# Patient Record
Sex: FEMALE | Race: WHITE | NOT HISPANIC OR LATINO | Employment: FULL TIME | URBAN - METROPOLITAN AREA
[De-identification: names, ages, dates, MRNs, and addresses within clinical notes are randomized per-mention and may not be internally consistent; named-entity substitution may affect disease eponyms.]

---

## 2018-11-27 ENCOUNTER — INITIAL CONSULT (OUTPATIENT)
Dept: NEUROSURGERY | Facility: CLINIC | Age: 53
End: 2018-11-27
Payer: COMMERCIAL

## 2018-11-27 VITALS
SYSTOLIC BLOOD PRESSURE: 157 MMHG | HEIGHT: 61 IN | WEIGHT: 107.06 LBS | BODY MASS INDEX: 20.21 KG/M2 | DIASTOLIC BLOOD PRESSURE: 88 MMHG | HEART RATE: 65 BPM

## 2018-11-27 DIAGNOSIS — M40.202 KYPHOSIS OF CERVICAL REGION, UNSPECIFIED KYPHOSIS TYPE: Primary | ICD-10-CM

## 2018-11-27 DIAGNOSIS — M43.12 SPONDYLOLISTHESIS OF CERVICAL REGION: ICD-10-CM

## 2018-11-27 DIAGNOSIS — M48.02 SPINAL STENOSIS IN CERVICAL REGION: ICD-10-CM

## 2018-11-27 DIAGNOSIS — M54.16 SPINAL STENOSIS OF LUMBAR REGION WITH RADICULOPATHY: ICD-10-CM

## 2018-11-27 DIAGNOSIS — M51.24 THORACIC DISC HERNIATION: ICD-10-CM

## 2018-11-27 DIAGNOSIS — M48.061 SPINAL STENOSIS OF LUMBAR REGION WITH RADICULOPATHY: ICD-10-CM

## 2018-11-27 DIAGNOSIS — M51.9 FORAMINAL STENOSIS DUE TO INTERVERTEBRAL DISC DISEASE: ICD-10-CM

## 2018-11-27 DIAGNOSIS — M99.79 FORAMINAL STENOSIS DUE TO INTERVERTEBRAL DISC DISEASE: ICD-10-CM

## 2018-11-27 DIAGNOSIS — M51.36 LUMBAR DEGENERATIVE DISC DISEASE: ICD-10-CM

## 2018-11-27 PROCEDURE — 99999 PR PBB SHADOW E&M-NEW PATIENT-LVL III: CPT | Mod: PBBFAC,,, | Performed by: STUDENT IN AN ORGANIZED HEALTH CARE EDUCATION/TRAINING PROGRAM

## 2018-11-27 PROCEDURE — 99245 OFF/OP CONSLTJ NEW/EST HI 55: CPT | Mod: S$GLB,,, | Performed by: STUDENT IN AN ORGANIZED HEALTH CARE EDUCATION/TRAINING PROGRAM

## 2018-11-27 NOTE — LETTER
November 27, 2018      Pablo Madison MD  [address] [facility] [place]  [facility] [address]           [facility]  [address] [facility] [address] [place] [address]  Phone: [phone]  Fax: [phone]          Patient: Rabia Winn   MR Number: [id number]   YOB: 1965   Date of Visit: 11/27/2018       Dear Dr. Pablo Madison:    Thank you for referring Rabia Winn to me for evaluation. Attached you will find relevant portions of my assessment and plan of care.    If you have questions, please do not hesitate to call me. I look forward to following Rabia Winn along with you.    Sincerely,    Jesus Kerr MD    Enclosure  CC:  No Recipients    If you would like to receive this communication electronically, please contact [email or web address] or [phone] to request more information on [employer] access.    For providers and/or their staff who would like to refer a patient to [facility], please contact us through our one-stop-shop provider referral line, [facility], at [phone].    If you feel you have received this communication in error or would no longer like to receive these types of communications, please e-mail [email or web address]
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
## 2018-11-29 ENCOUNTER — TELEPHONE (OUTPATIENT)
Dept: PAIN MEDICINE | Facility: CLINIC | Age: 53
End: 2018-11-29

## 2018-11-29 NOTE — TELEPHONE ENCOUNTER
----- Message from Nat Tan LPN sent at 11/29/2018  9:23 AM CST -----  Please call pt to schedule C4-5 facet injection per Dr. Kerr. Referral in system.

## 2018-11-30 NOTE — TELEPHONE ENCOUNTER
Spoke with the patient and she had a lot of questions regarding the recommended injection. Consult appointment scheduled to discuss with Dr. Bowles.

## 2018-12-04 ENCOUNTER — OFFICE VISIT (OUTPATIENT)
Dept: PAIN MEDICINE | Facility: CLINIC | Age: 53
End: 2018-12-04
Payer: COMMERCIAL

## 2018-12-04 VITALS
WEIGHT: 108.56 LBS | DIASTOLIC BLOOD PRESSURE: 87 MMHG | OXYGEN SATURATION: 100 % | SYSTOLIC BLOOD PRESSURE: 169 MMHG | BODY MASS INDEX: 20.52 KG/M2 | HEART RATE: 65 BPM

## 2018-12-04 DIAGNOSIS — M79.18 MYOFASCIAL MUSCLE PAIN: ICD-10-CM

## 2018-12-04 DIAGNOSIS — M54.16 LUMBAR RADICULOPATHY: ICD-10-CM

## 2018-12-04 DIAGNOSIS — M47.812 SPONDYLOSIS OF CERVICAL REGION WITHOUT MYELOPATHY OR RADICULOPATHY: Primary | ICD-10-CM

## 2018-12-04 PROCEDURE — 20552 NJX 1/MLT TRIGGER POINT 1/2: CPT | Mod: S$GLB,,, | Performed by: ANESTHESIOLOGY

## 2018-12-04 PROCEDURE — 99999 PR PBB SHADOW E&M-EST. PATIENT-LVL III: CPT | Mod: PBBFAC,,, | Performed by: ANESTHESIOLOGY

## 2018-12-04 PROCEDURE — 99244 OFF/OP CNSLTJ NEW/EST MOD 40: CPT | Mod: 25,S$GLB,, | Performed by: ANESTHESIOLOGY

## 2018-12-04 RX ORDER — CELECOXIB 200 MG/1
200 CAPSULE ORAL DAILY PRN
Qty: 30 CAPSULE | Refills: 1 | Status: SHIPPED | OUTPATIENT
Start: 2018-12-04 | End: 2019-08-02 | Stop reason: SDUPTHER

## 2018-12-04 RX ORDER — GABAPENTIN 100 MG/1
100 CAPSULE ORAL DAILY
COMMUNITY

## 2018-12-04 RX ORDER — CELECOXIB 200 MG/1
200 CAPSULE ORAL ONCE
COMMUNITY
End: 2018-12-04

## 2018-12-04 RX ORDER — METHYLPREDNISOLONE ACETATE 40 MG/ML
40 INJECTION, SUSPENSION INTRA-ARTICULAR; INTRALESIONAL; INTRAMUSCULAR; SOFT TISSUE
Status: COMPLETED | OUTPATIENT
Start: 2018-12-04 | End: 2018-12-04

## 2018-12-04 RX ADMIN — METHYLPREDNISOLONE ACETATE 40 MG: 40 INJECTION, SUSPENSION INTRA-ARTICULAR; INTRALESIONAL; INTRAMUSCULAR; SOFT TISSUE at 02:12

## 2018-12-04 NOTE — LETTER
December 4, 2018      Jesus Kerr MD  [address] [facility] [address] [place] [address]           [facility]  [address] [facility] [address] [place] [address]  Phone: [phone]  Fax: [phone]          Patient: Rabia Winn   MR Number: [id number]   YOB: 1965   Date of Visit: 12/4/2018       Dear Dr. Jesus Kerr:    Thank you for referring Rabia Winn to me for evaluation. Attached you will find relevant portions of my assessment and plan of care.    If you have questions, please do not hesitate to call me. I look forward to following Rabia Winn along with you.    Sincerely,    Charlie Bowles MD    Enclosure  CC:  No Recipients    If you would like to receive this communication electronically, please contact [email or web address] or [phone] to request more information on [employer] Link access.    For providers and/or their staff who would like to refer a patient to [facility], please contact us through our one-stop-shop provider referral line, [facility], at [phone].    If you feel you have received this communication in error or would no longer like to receive these types of communications, please e-mail [email or web address]
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
## 2018-12-12 ENCOUNTER — TELEPHONE (OUTPATIENT)
Dept: PAIN MEDICINE | Facility: CLINIC | Age: 53
End: 2018-12-12

## 2018-12-12 DIAGNOSIS — M47.812 CERVICAL SPONDYLOSIS: ICD-10-CM

## 2018-12-12 DIAGNOSIS — M47.812 SPONDYLOSIS OF CERVICAL REGION WITHOUT MYELOPATHY OR RADICULOPATHY: Primary | ICD-10-CM

## 2018-12-12 RX ORDER — ALPRAZOLAM 0.5 MG/1
0.5 TABLET, ORALLY DISINTEGRATING ORAL ONCE AS NEEDED
Status: CANCELLED | OUTPATIENT
Start: 2018-12-19 | End: 2018-12-19

## 2018-12-12 NOTE — TELEPHONE ENCOUNTER
Patient scheduled her cervical medial branch block for 12/19, procedure instructions reviewed and patient verbalized understanding.

## 2018-12-12 NOTE — TELEPHONE ENCOUNTER
----- Message from Bernie Omalley sent at 12/12/2018  2:00 PM CST -----  Contact: self   Patient want to speak with a nurse regarding scheduling injections in neck please call back at [phone]

## 2018-12-17 ENCOUNTER — TELEPHONE (OUTPATIENT)
Dept: SURGERY | Facility: HOSPITAL | Age: 53
End: 2018-12-17

## 2018-12-17 NOTE — TELEPHONE ENCOUNTER
When doing pre op call, patient reported that she had taken her last dose of a Z-pack for a sore throat today. Procedure is scheduled for Wednesday, December 19th. Will this require her procedure to be rescheduled?

## 2018-12-18 DIAGNOSIS — M50.30 DDD (DEGENERATIVE DISC DISEASE), CERVICAL: Primary | ICD-10-CM

## 2018-12-19 ENCOUNTER — HOSPITAL ENCOUNTER (OUTPATIENT)
Dept: RADIOLOGY | Facility: HOSPITAL | Age: 53
Discharge: HOME | End: 2018-12-19
Attending: ANESTHESIOLOGY | Admitting: ANESTHESIOLOGY
Payer: COMMERCIAL

## 2018-12-19 ENCOUNTER — HOSPITAL ENCOUNTER (OUTPATIENT)
Facility: HOSPITAL | Age: 53
Discharge: HOME | End: 2018-12-19
Attending: ANESTHESIOLOGY | Admitting: ANESTHESIOLOGY
Payer: COMMERCIAL

## 2018-12-19 VITALS
RESPIRATION RATE: 16 BRPM | SYSTOLIC BLOOD PRESSURE: 133 MMHG | DIASTOLIC BLOOD PRESSURE: 75 MMHG | OXYGEN SATURATION: 98 % | TEMPERATURE: 97 F | WEIGHT: 108 LBS | BODY MASS INDEX: 20.39 KG/M2 | HEIGHT: 61 IN | HEART RATE: 71 BPM

## 2018-12-19 DIAGNOSIS — M47.812 CERVICAL SPONDYLOSIS: Primary | ICD-10-CM

## 2018-12-19 DIAGNOSIS — M47.812 SPONDYLOSIS OF CERVICAL REGION WITHOUT MYELOPATHY OR RADICULOPATHY: ICD-10-CM

## 2018-12-19 DIAGNOSIS — M50.30 DDD (DEGENERATIVE DISC DISEASE), CERVICAL: ICD-10-CM

## 2018-12-19 PROCEDURE — 64492 INJ PARAVERT F JNT C/T 3 LEV: CPT | Mod: 50,PO | Performed by: ANESTHESIOLOGY

## 2018-12-19 PROCEDURE — 63600175 PHARM REV CODE 636 W HCPCS: Mod: PO | Performed by: ANESTHESIOLOGY

## 2018-12-19 PROCEDURE — 25000003 PHARM REV CODE 250: Mod: PO | Performed by: ANESTHESIOLOGY

## 2018-12-19 PROCEDURE — 64490 INJ PARAVERT F JNT C/T 1 LEV: CPT | Mod: 50,PO | Performed by: ANESTHESIOLOGY

## 2018-12-19 PROCEDURE — 99152 MOD SED SAME PHYS/QHP 5/>YRS: CPT | Mod: ,,, | Performed by: ANESTHESIOLOGY

## 2018-12-19 PROCEDURE — 64491 INJ PARAVERT F JNT C/T 2 LEV: CPT | Mod: 50,PO | Performed by: ANESTHESIOLOGY

## 2018-12-19 PROCEDURE — 64490 INJ PARAVERT F JNT C/T 1 LEV: CPT | Mod: 50,,, | Performed by: ANESTHESIOLOGY

## 2018-12-19 PROCEDURE — 64491 INJ PARAVERT F JNT C/T 2 LEV: CPT | Mod: 50,,, | Performed by: ANESTHESIOLOGY

## 2018-12-19 PROCEDURE — 76000 FLUOROSCOPY <1 HR PHYS/QHP: CPT | Mod: TC,PO

## 2018-12-19 PROCEDURE — S0020 INJECTION, BUPIVICAINE HYDRO: HCPCS | Mod: PO | Performed by: ANESTHESIOLOGY

## 2018-12-19 PROCEDURE — A4216 STERILE WATER/SALINE, 10 ML: HCPCS | Mod: PO | Performed by: ANESTHESIOLOGY

## 2018-12-19 RX ORDER — LIDOCAINE HYDROCHLORIDE 10 MG/ML
INJECTION, SOLUTION EPIDURAL; INFILTRATION; INTRACAUDAL; PERINEURAL
Status: DISCONTINUED | OUTPATIENT
Start: 2018-12-19 | End: 2018-12-19 | Stop reason: HOSPADM

## 2018-12-19 RX ORDER — MIDAZOLAM HYDROCHLORIDE 1 MG/ML
INJECTION INTRAMUSCULAR; INTRAVENOUS
Status: DISCONTINUED | OUTPATIENT
Start: 2018-12-19 | End: 2018-12-19 | Stop reason: HOSPADM

## 2018-12-19 RX ORDER — SODIUM BICARBONATE 1 MEQ/ML
SYRINGE (ML) INTRAVENOUS
Status: DISCONTINUED | OUTPATIENT
Start: 2018-12-19 | End: 2018-12-19 | Stop reason: HOSPADM

## 2018-12-19 RX ORDER — ALPRAZOLAM 0.5 MG/1
0.5 TABLET, ORALLY DISINTEGRATING ORAL ONCE AS NEEDED
Status: COMPLETED | OUTPATIENT
Start: 2018-12-19 | End: 2018-12-19

## 2018-12-19 RX ORDER — BUPIVACAINE HYDROCHLORIDE 2.5 MG/ML
INJECTION, SOLUTION EPIDURAL; INFILTRATION; INTRACAUDAL
Status: DISCONTINUED | OUTPATIENT
Start: 2018-12-19 | End: 2018-12-19 | Stop reason: HOSPADM

## 2018-12-19 RX ORDER — SODIUM CHLORIDE 9 MG/ML
INJECTION, SOLUTION INTRAMUSCULAR; INTRAVENOUS; SUBCUTANEOUS
Status: DISCONTINUED | OUTPATIENT
Start: 2018-12-19 | End: 2018-12-19 | Stop reason: HOSPADM

## 2018-12-19 RX ORDER — BUPIVACAINE HYDROCHLORIDE 5 MG/ML
INJECTION, SOLUTION EPIDURAL; INTRACAUDAL
Status: DISCONTINUED | OUTPATIENT
Start: 2018-12-19 | End: 2018-12-19 | Stop reason: HOSPADM

## 2018-12-19 RX ADMIN — ALPRAZOLAM 0.5 MG: 0.5 TABLET, ORALLY DISINTEGRATING ORAL at 01:12

## 2018-12-19 NOTE — OP NOTE
Procedure date: 12/19/2018    Procedure:  Cervical Facet Block(s) (Medial Branch) @ C3-C6, with Fluoroscopic Guidance on the bilateral side utilizing fluoroscopy    Indication: Patient failed conservative therapy    Pre-op diagnosis: Cervical spondylosis    Post-op diagnosis: same    Physician: Charlie Bowles MD    Medications injected:  Mixture of 3ml bupivacaine 0.25% and 4mg dexamethasone, 0.5ml at each level    Local anesthetic used: 1% lidocaine, 1ml at each level.    Sedation medications: versed 2mg    Estimated blood loss:  none    Complications:  none    Technique: The patient was interviewed in the holding area and Risks/Benefits were discussed, including, but not limited to, the possibility of new or different pain, bleeding or infection.  All questions were answered.  The patient understood and accepted risks.  Consent was reviewed.  A time-out was taken to identify patient and procedure side prior to starting the procedure.  The patient was brought into the operating room and placed in prone position and prepped and draped in the usual fashion using ChloraPrep and sterile towels and then the procedure was performed using strict aseptic techniques.  AP fluoroscopy was used to identify the waists of the mid-articular pillars of the C3-C6 on the right side.  1% Lidocaine was used via a 25 Gauge needle for skin.  Then, under AP fluoroscopic guidance, a 25 gauge 3.5 inch spinal needle was advanced to the anatomic location of the midsection of the lateral masses (or in the case of third occipital nerve, to the joint of C2/C3).  Once os was encountered, lateral fluoroscopic views were obtained to ensure that needles did not cross into neural foramina.  After heme-negative aspiration, 0.5ml of a mixture of 3 mL 0.25% bupivacaine and 4mg dexamethasone was injected at each of the above targeted points corresponding to the locations of the targeted medial branch nerves.     The same procedure was repeated on the  opposite side    The patient tolerated the procedure well and was transferred to the .AC. in stable condition.  The patient was monitored after the procedure.  The patient will be contacted tomorrow to determine the extent of relief.  The patient was given post procedure and discharge instructions to follow at home.  The patient was discharged in a stable condition.

## 2018-12-19 NOTE — INTERVAL H&P NOTE
The patient has been examined and the H&P has been reviewed:    I concur with the findings and no changes have occurred since H&P was written.  Will proceed with bilateral C3-C6 medial branch blocks.  Procedure explained using an anatomical model.  Risks, benefits, alternatives explained to patient who verbalized understanding, including increased risk of infection, bleeding, need for additional procedures or surgery, and nerve damage.  Questions regarding the procedure, risks, expected outcome, and possible side effects were solicited and answered to the patient's satisfaction.  Rabia wishes to proceed with the injection.  Verbal and written consent were obtained.    Anesthesia/Surgery risks, benefits and alternative options discussed and understood by patient/family.          Active Hospital Problems    Diagnosis  POA    Cervical spondylosis [M47.812]  Yes      Resolved Hospital Problems   No resolved problems to display.

## 2018-12-19 NOTE — PLAN OF CARE
Vss, erick po fluids, denies pain, ambulates easily. IV removed, catheter intact. Discharge instructions provided and states understanding. States ready to go home.  Discharged from facility with family.

## 2018-12-19 NOTE — DISCHARGE SUMMARY
[facility]  Discharge Note  Short Stay    Admit Date: 12/19/2018    Discharge Date: 12/19/2018    Attending Physician: Charlie Bowles     Discharge Provider: Charlie Bowles    Diagnoses:  Active Hospital Problems    Diagnosis  POA    Cervical spondylosis [M47.812]  Yes      Resolved Hospital Problems   No resolved problems to display.       Discharged Condition: Good    Final Diagnoses: Spondylosis of cervical region without myelopathy or radiculopathy [M47.812]    Disposition: Home or Self Care    Hospital Course: No complications, uneventful    Outcome of Hospitalization, Treatment, Procedure, or Surgery:  Patient was admitted for outpatient interventional pain management procedure. The patient tolerated the procedure well with no complications.    Follow up/Patient Instructions:  Follow up as scheduled in Pain Management office in 3-4 weeks.  Patient has received instructions and follow up date and time.    Medications:  Continue previous medications    Discharge Procedure Orders   Notify your health care provider if you experience any of the following:  temperature >100.4     Notify your health care provider if you experience any of the following:  persistent nausea and vomiting or diarrhea     Notify your health care provider if you experience any of the following:  severe uncontrolled pain     Notify your health care provider if you experience any of the following:  redness, tenderness, or signs of infection (pain, swelling, redness, odor or green/yellow discharge around incision site)     Notify your health care provider if you experience any of the following:  difficulty breathing or increased cough     Notify your health care provider if you experience any of the following:  severe persistent headache     Notify your health care provider if you experience any of the following:  worsening rash     Notify your health care provider if you experience any of the following:  persistent dizziness,  light-headedness, or visual disturbances     Notify your health care provider if you experience any of the following:  increased confusion or weakness     Activity as tolerated         Discharge Procedure Orders (must include Diet, Follow-up, Activity):   Discharge Procedure Orders (must include Diet, Follow-up, Activity)   Notify your health care provider if you experience any of the following:  temperature >100.4     Notify your health care provider if you experience any of the following:  persistent nausea and vomiting or diarrhea     Notify your health care provider if you experience any of the following:  severe uncontrolled pain     Notify your health care provider if you experience any of the following:  redness, tenderness, or signs of infection (pain, swelling, redness, odor or green/yellow discharge around incision site)     Notify your health care provider if you experience any of the following:  difficulty breathing or increased cough     Notify your health care provider if you experience any of the following:  severe persistent headache     Notify your health care provider if you experience any of the following:  worsening rash     Notify your health care provider if you experience any of the following:  persistent dizziness, light-headedness, or visual disturbances     Notify your health care provider if you experience any of the following:  increased confusion or weakness     Activity as tolerated

## 2018-12-19 NOTE — DISCHARGE INSTRUCTIONS
Home care instructions  Apply ice pack to the injection site for 20 minutes periods for the first 24 hrs for soreness/discomfort at injection site DO NOT USE HEAT FOR 24 HOURS  Keep site clean and dry for 24 hours, remove bandaid when desired  Do not drive until tomorrow  Avoid strenuous activities for 2 days  Make take 2 weeks to feel the full effects   Resume home medication as prescribed today  Resume Aspirin, Plavix, or Coumadin the day after the procedure unless otherwise instructed.    SEE IMMEDIATE MEDICAL HELP FOR:  Severe increase in your usual pain or appearance of new pain  Prolonged or increasing weakness or numbness in the legs or arms  Drainage, redness, active bleeding, or increased swelling at the injection site  Temperature over 100.0 degrees F.  Headache that increases when your head is upright and decreases when you lie flat    CALL 911 OR GO DIRECTLY TO EMERGENCY DEPARTMENT FOR:  Shortness of breath, chest pain, or problems breathing      Recovery After Procedural Sedation (Adult)  You have been given medicine by vein to make you sleep during your surgery. This may have included both a pain medicine and sleeping medicine. Most of the effects have worn off. But you may still have some drowsiness for the next 6 to 8 hours.  Home care  Follow these guidelines when you get home:  · For the next 8 hours, you should be watched by a responsible adult. This person should make sure your condition is not getting worse.  · Don't drink any alcohol for the next 24 hours.  · Don't drive, operate dangerous machinery, or make important business or personal decisions during the next 24 hours.  Note: Your healthcare provider may tell you not to take any medicine by mouth for pain or sleep in the next 4 hours. These medicines may react with the medicines you were given in the hospital. This could cause a much stronger response than usual.  Follow-up care  Follow up with your healthcare provider if you are not alert  and back to your usual level of activity within 12 hours.  When to seek medical advice  Call your healthcare provider right away if any of these occur:  · Drowsiness gets worse  · Weakness or dizziness gets worse  · Repeated vomiting  · You can't be awakened   Date Last Reviewed: 10/18/2016  © 5542-6567 [employer]. [address] [place] [address], [place] [address]. All rights reserved. This information is not intended as a substitute for professional medical care. Always follow your healthcare professional's instructions.

## 2019-01-08 ENCOUNTER — TELEPHONE (OUTPATIENT)
Dept: NEUROSURGERY | Facility: CLINIC | Age: 54
End: 2019-01-08

## 2019-01-08 NOTE — TELEPHONE ENCOUNTER
Spoke with pt and informed that letter would be determined at next appt. Pt verbalized understanding.

## 2019-01-08 NOTE — TELEPHONE ENCOUNTER
----- Message from Onelia Nolasco sent at 1/8/2019 12:20 PM CST -----  Contact: self  Patient called requesting a letter excusing her from jury duty. Patient call back is [phone]. Thank you.

## 2019-01-15 ENCOUNTER — OFFICE VISIT (OUTPATIENT)
Dept: NEUROSURGERY | Facility: CLINIC | Age: 54
End: 2019-01-15
Payer: COMMERCIAL

## 2019-01-15 VITALS
BODY MASS INDEX: 20.81 KG/M2 | DIASTOLIC BLOOD PRESSURE: 74 MMHG | TEMPERATURE: 98 F | WEIGHT: 110.13 LBS | HEART RATE: 66 BPM | SYSTOLIC BLOOD PRESSURE: 168 MMHG

## 2019-01-15 DIAGNOSIS — M40.12 OTHER SECONDARY KYPHOSIS, CERVICAL REGION: ICD-10-CM

## 2019-01-15 DIAGNOSIS — M47.22 OSTEOARTHRITIS OF SPINE WITH RADICULOPATHY, CERVICAL REGION: Primary | ICD-10-CM

## 2019-01-15 DIAGNOSIS — R93.7 ABNORMAL X-RAY OF CERVICAL SPINE: ICD-10-CM

## 2019-01-15 PROCEDURE — 99215 PR OFFICE/OUTPT VISIT, EST, LEVL V, 40-54 MIN: ICD-10-PCS | Mod: S$GLB,,, | Performed by: STUDENT IN AN ORGANIZED HEALTH CARE EDUCATION/TRAINING PROGRAM

## 2019-01-15 PROCEDURE — 99215 OFFICE O/P EST HI 40 MIN: CPT | Mod: S$GLB,,, | Performed by: STUDENT IN AN ORGANIZED HEALTH CARE EDUCATION/TRAINING PROGRAM

## 2019-01-15 PROCEDURE — 99999 PR PBB SHADOW E&M-EST. PATIENT-LVL III: CPT | Mod: PBBFAC,,, | Performed by: STUDENT IN AN ORGANIZED HEALTH CARE EDUCATION/TRAINING PROGRAM

## 2019-01-15 PROCEDURE — 99999 PR PBB SHADOW E&M-EST. PATIENT-LVL III: ICD-10-PCS | Mod: PBBFAC,,, | Performed by: STUDENT IN AN ORGANIZED HEALTH CARE EDUCATION/TRAINING PROGRAM

## 2019-01-15 RX ORDER — ZOLPIDEM TARTRATE 5 MG/1
5 TABLET ORAL NIGHTLY PRN
Qty: 30 TABLET | Refills: 0 | Status: SHIPPED | OUTPATIENT
Start: 2019-01-15 | End: 2019-03-22 | Stop reason: SDUPTHER

## 2019-01-15 RX ORDER — METHYLPREDNISOLONE 4 MG/1
TABLET ORAL
Qty: 1 PACKAGE | Refills: 0 | Status: SHIPPED | OUTPATIENT
Start: 2019-01-15 | End: 2019-01-24 | Stop reason: CLARIF
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
## 2019-01-16 DIAGNOSIS — Z98.1 S/P CERVICAL SPINAL FUSION: Primary | ICD-10-CM

## 2019-01-16 DIAGNOSIS — M47.22 OSTEOARTHRITIS OF SPINE WITH RADICULOPATHY, CERVICAL REGION: Primary | ICD-10-CM

## 2019-01-16 RX ORDER — SODIUM CHLORIDE, SODIUM LACTATE, POTASSIUM CHLORIDE, CALCIUM CHLORIDE 600; 310; 30; 20 MG/100ML; MG/100ML; MG/100ML; MG/100ML
INJECTION, SOLUTION INTRAVENOUS CONTINUOUS
Status: CANCELLED | OUTPATIENT
Start: 2019-01-16

## 2019-01-16 RX ORDER — LIDOCAINE HYDROCHLORIDE 10 MG/ML
1 INJECTION, SOLUTION EPIDURAL; INFILTRATION; INTRACAUDAL; PERINEURAL ONCE
Status: CANCELLED | OUTPATIENT
Start: 2019-01-16 | End: 2019-01-16

## 2019-01-22 ENCOUNTER — HOSPITAL ENCOUNTER (OUTPATIENT)
Dept: RADIOLOGY | Facility: HOSPITAL | Age: 54
Discharge: HOME | End: 2019-01-22
Attending: STUDENT IN AN ORGANIZED HEALTH CARE EDUCATION/TRAINING PROGRAM
Payer: COMMERCIAL

## 2019-01-22 DIAGNOSIS — R93.7 ABNORMAL X-RAY OF CERVICAL SPINE: ICD-10-CM

## 2019-01-22 PROCEDURE — 72125 CT NECK SPINE W/O DYE: CPT | Mod: 26,,, | Performed by: RADIOLOGY

## 2019-01-22 PROCEDURE — 72125 CT CERVICAL SPINE WITHOUT CONTRAST: ICD-10-PCS | Mod: 26,,, | Performed by: RADIOLOGY

## 2019-01-22 PROCEDURE — 72050 XR CERVICAL SPINE AP LAT WITH FLEX EXTEN: ICD-10-PCS | Mod: 26,,, | Performed by: RADIOLOGY

## 2019-01-22 PROCEDURE — 72050 X-RAY EXAM NECK SPINE 4/5VWS: CPT | Mod: 26,,, | Performed by: RADIOLOGY

## 2019-01-22 PROCEDURE — 72125 CT NECK SPINE W/O DYE: CPT | Mod: TC,PO

## 2019-01-22 PROCEDURE — 72050 X-RAY EXAM NECK SPINE 4/5VWS: CPT | Mod: TC,FY,PO

## 2019-01-28 ENCOUNTER — OFFICE VISIT (OUTPATIENT)
Dept: FAMILY MEDICINE | Facility: CLINIC | Age: 54
End: 2019-01-28
Payer: COMMERCIAL

## 2019-01-28 VITALS
HEIGHT: 61 IN | OXYGEN SATURATION: 99 % | SYSTOLIC BLOOD PRESSURE: 154 MMHG | BODY MASS INDEX: 19.9 KG/M2 | WEIGHT: 105.38 LBS | HEART RATE: 62 BPM | DIASTOLIC BLOOD PRESSURE: 90 MMHG

## 2019-01-28 DIAGNOSIS — Z00.00 PREVENTATIVE HEALTH CARE: ICD-10-CM

## 2019-01-28 DIAGNOSIS — I10 ESSENTIAL HYPERTENSION: ICD-10-CM

## 2019-01-28 DIAGNOSIS — Z01.818 PRE-OP EXAM: Primary | ICD-10-CM

## 2019-01-28 PROCEDURE — 99999 PR PBB SHADOW E&M-EST. PATIENT-LVL III: CPT | Mod: PBBFAC,,, | Performed by: NURSE PRACTITIONER

## 2019-01-28 PROCEDURE — 99214 PR OFFICE/OUTPT VISIT, EST, LEVL IV, 30-39 MIN: ICD-10-PCS | Mod: S$GLB,,, | Performed by: NURSE PRACTITIONER

## 2019-01-28 PROCEDURE — 93010 ELECTROCARDIOGRAM REPORT: CPT | Mod: S$GLB,,, | Performed by: INTERNAL MEDICINE

## 2019-01-28 PROCEDURE — 99999 PR PBB SHADOW E&M-EST. PATIENT-LVL III: ICD-10-PCS | Mod: PBBFAC,,, | Performed by: NURSE PRACTITIONER

## 2019-01-28 PROCEDURE — 93005 EKG 12-LEAD: ICD-10-PCS | Mod: S$GLB,,, | Performed by: NURSE PRACTITIONER

## 2019-01-28 PROCEDURE — 93010 EKG 12-LEAD: ICD-10-PCS | Mod: S$GLB,,, | Performed by: INTERNAL MEDICINE

## 2019-01-28 PROCEDURE — 93005 ELECTROCARDIOGRAM TRACING: CPT | Mod: S$GLB,,, | Performed by: NURSE PRACTITIONER

## 2019-01-28 PROCEDURE — 99214 OFFICE O/P EST MOD 30 MIN: CPT | Mod: S$GLB,,, | Performed by: NURSE PRACTITIONER

## 2019-01-28 RX ORDER — AMLODIPINE BESYLATE 5 MG/1
2.5 TABLET ORAL DAILY
Qty: 30 TABLET | Refills: 3 | Status: SHIPPED | OUTPATIENT
Start: 2019-01-28 | End: 2019-05-16 | Stop reason: SINTOL

## 2019-01-28 NOTE — Clinical Note
Patient here for pre operative clearance for surgery Monday, exam stable for pre op clearance except for elevated BP, med started, chris recheck Thursday scheduled.
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
## 2019-01-29 ENCOUNTER — TELEPHONE (OUTPATIENT)
Dept: FAMILY MEDICINE | Facility: CLINIC | Age: 54
End: 2019-01-29

## 2019-01-31 ENCOUNTER — TELEPHONE (OUTPATIENT)
Dept: NEUROSURGERY | Facility: CLINIC | Age: 54
End: 2019-01-31

## 2019-01-31 ENCOUNTER — CLINICAL SUPPORT (OUTPATIENT)
Dept: FAMILY MEDICINE | Facility: CLINIC | Age: 54
End: 2019-01-31
Payer: COMMERCIAL

## 2019-01-31 VITALS — SYSTOLIC BLOOD PRESSURE: 138 MMHG | DIASTOLIC BLOOD PRESSURE: 88 MMHG

## 2019-01-31 DIAGNOSIS — Z01.30 BLOOD PRESSURE CHECK: Primary | ICD-10-CM

## 2019-01-31 PROCEDURE — 99499 NO LOS: ICD-10-PCS | Mod: S$GLB,,, | Performed by: NURSE PRACTITIONER

## 2019-01-31 PROCEDURE — 99499 UNLISTED E&M SERVICE: CPT | Mod: S$GLB,,, | Performed by: NURSE PRACTITIONER

## 2019-01-31 NOTE — TELEPHONE ENCOUNTER
Spoke with patient in regards to surgery auth. Patient informed that her surgery would be considered a self pay if she chose to proceed with her surgery at [facility]. Patient agreed to call her insurance.

## 2019-01-31 NOTE — PROGRESS NOTES
Rabia Winn 53 y.o. female is here today for Blood Pressure check.   History of HTN no.    Review of patient's allergies indicates:  No Known Allergies  Creatinine   Date Value Ref Range Status   01/24/2019 0.55 0.50 - 1.40 mg/dL Final     Sodium   Date Value Ref Range Status   01/24/2019 141 136 - 145 mmol/L Final     Potassium   Date Value Ref Range Status   01/24/2019 4.3 3.5 - 5.1 mmol/L Final   ]  Patient verifies taking blood pressure medications on a regular basis at the same time of the day.     Current Outpatient Medications:     amLODIPine (NORVASC) 5 MG tablet, Take 0.5 tablets (2.5 mg total) by mouth once daily., Disp: 30 tablet, Rfl: 3    celecoxib (CELEBREX) 200 MG capsule, Take 1 capsule (200 mg total) by mouth daily as needed for Pain., Disp: 30 capsule, Rfl: 1    [START ON 2/2/2019] chlorhexidine (PERIDEX) 0.12 % solution, Use as directed 10 mLs in the mouth or throat 2 (two) times daily., Disp: , Rfl:     gabapentin (NEURONTIN) 100 MG capsule, Take 100 mg by mouth once., Disp: , Rfl:     [START ON 2/2/2019] mupirocin (BACTROBAN) 2 % ointment, Apply topically 2 (two) times daily., Disp: , Rfl:     zolpidem (AMBIEN) 5 MG Tab, Take 1 tablet (5 mg total) by mouth nightly as needed., Disp: 30 tablet, Rfl: 0  Does patient have record of home blood pressure readings no. Readings have been averaging n/a.   Last dose of blood pressure medication was taken at today.  Patient is asymptomatic.   Complains of nothing.    BP: 138/88 ,   .    Blood pressure reading after 15 minutes was n/a, Pulse n/a.  Dr. Rondon, NP notified.

## 2019-02-01 ENCOUNTER — TELEPHONE (OUTPATIENT)
Dept: NEUROSURGERY | Facility: CLINIC | Age: 54
End: 2019-02-01

## 2019-02-02 ENCOUNTER — TELEPHONE (OUTPATIENT)
Dept: GASTROENTEROLOGY | Facility: CLINIC | Age: 54
End: 2019-02-02

## 2019-02-02 NOTE — TELEPHONE ENCOUNTER
Pt requesting a call in February to schedule colonoscopy. Pt is having neck sx and wants to wait til after her recovery.

## 2019-02-04 DIAGNOSIS — M47.22 OSTEOARTHRITIS OF SPINE WITH RADICULOPATHY, CERVICAL REGION: Primary | ICD-10-CM

## 2019-02-04 NOTE — PRE-PROCEDURE INSTRUCTIONS
PreOp Instructions given:   - Verbal medication information (what to hold and what to take)   - NPO guidelines   - Arrival place directions given; time to be given the day before procedure by the   Surgeon's Office   - Bathing with antibacterial soap   - Don't wear any jewelry or bring any valuables AM of surgery   - No makeup or moisturizer to face   - No perfume/cologne, powder, lotions or aftershave   Pt. verbalized understanding.    Denies any history of side effects or issues with anesthesia or sedation.

## 2019-02-05 ENCOUNTER — ANESTHESIA (OUTPATIENT)
Dept: SURGERY | Facility: HOSPITAL | Age: 54
DRG: 473 | End: 2019-02-05
Payer: COMMERCIAL

## 2019-02-05 ENCOUNTER — ANESTHESIA EVENT (OUTPATIENT)
Dept: SURGERY | Facility: HOSPITAL | Age: 54
DRG: 473 | End: 2019-02-05
Payer: COMMERCIAL

## 2019-02-05 ENCOUNTER — HOSPITAL ENCOUNTER (INPATIENT)
Facility: HOSPITAL | Age: 54
LOS: 2 days | Discharge: HOME HEALTH | DRG: 473 | End: 2019-02-07
Attending: STUDENT IN AN ORGANIZED HEALTH CARE EDUCATION/TRAINING PROGRAM | Admitting: STUDENT IN AN ORGANIZED HEALTH CARE EDUCATION/TRAINING PROGRAM
Payer: COMMERCIAL

## 2019-02-05 DIAGNOSIS — M47.22 OSTEOARTHRITIS OF SPINE WITH RADICULOPATHY, CERVICAL REGION: ICD-10-CM

## 2019-02-05 DIAGNOSIS — M43.12 SPONDYLOLISTHESIS, CERVICAL REGION: Primary | ICD-10-CM

## 2019-02-05 LAB
ABO + RH BLD: NORMAL
ANION GAP SERPL CALC-SCNC: 9 MMOL/L
BASOPHILS # BLD AUTO: 0.04 K/UL
BASOPHILS NFR BLD: 0.9 %
BLD GP AB SCN CELLS X3 SERPL QL: NORMAL
BUN SERPL-MCNC: 20 MG/DL
CALCIUM SERPL-MCNC: 9.5 MG/DL
CHLORIDE SERPL-SCNC: 105 MMOL/L
CO2 SERPL-SCNC: 25 MMOL/L
CREAT SERPL-MCNC: 0.7 MG/DL
DIFFERENTIAL METHOD: ABNORMAL
EOSINOPHIL # BLD AUTO: 0.1 K/UL
EOSINOPHIL NFR BLD: 1.9 %
ERYTHROCYTE [DISTWIDTH] IN BLOOD BY AUTOMATED COUNT: 11.9 %
EST. GFR  (AFRICAN AMERICAN): >60 ML/MIN/1.73 M^2
EST. GFR  (NON AFRICAN AMERICAN): >60 ML/MIN/1.73 M^2
GLUCOSE SERPL-MCNC: 93 MG/DL
HCT VFR BLD AUTO: 34.6 %
HGB BLD-MCNC: 11.7 G/DL
IMM GRANULOCYTES # BLD AUTO: 0.01 K/UL
IMM GRANULOCYTES NFR BLD AUTO: 0.2 %
INR PPP: 1
LYMPHOCYTES # BLD AUTO: 1.6 K/UL
LYMPHOCYTES NFR BLD: 36.1 %
MCH RBC QN AUTO: 31.5 PG
MCHC RBC AUTO-ENTMCNC: 33.8 G/DL
MCV RBC AUTO: 93 FL
MONOCYTES # BLD AUTO: 0.5 K/UL
MONOCYTES NFR BLD: 11.4 %
NEUTROPHILS # BLD AUTO: 2.1 K/UL
NEUTROPHILS NFR BLD: 49.5 %
NRBC BLD-RTO: 0 /100 WBC
PLATELET # BLD AUTO: 232 K/UL
PMV BLD AUTO: 10.8 FL
POTASSIUM SERPL-SCNC: 4.3 MMOL/L
PROTHROMBIN TIME: 10 SEC
RBC # BLD AUTO: 3.72 M/UL
SODIUM SERPL-SCNC: 139 MMOL/L
WBC # BLD AUTO: 4.29 K/UL

## 2019-02-05 PROCEDURE — 22552 ARTHRD ANT NTRBD CERVICAL EA: CPT | Mod: ,,, | Performed by: STUDENT IN AN ORGANIZED HEALTH CARE EDUCATION/TRAINING PROGRAM

## 2019-02-05 PROCEDURE — C1769 GUIDE WIRE: HCPCS | Performed by: STUDENT IN AN ORGANIZED HEALTH CARE EDUCATION/TRAINING PROGRAM

## 2019-02-05 PROCEDURE — 20930 PR ALLOGRAFT FOR SPINE SURGERY ONLY MORSELIZED: ICD-10-PCS | Mod: ,,, | Performed by: STUDENT IN AN ORGANIZED HEALTH CARE EDUCATION/TRAINING PROGRAM

## 2019-02-05 PROCEDURE — 22853 INSJ BIOMECHANICAL DEVICE: CPT | Mod: ,,, | Performed by: STUDENT IN AN ORGANIZED HEALTH CARE EDUCATION/TRAINING PROGRAM

## 2019-02-05 PROCEDURE — 37000009 HC ANESTHESIA EA ADD 15 MINS: Performed by: STUDENT IN AN ORGANIZED HEALTH CARE EDUCATION/TRAINING PROGRAM

## 2019-02-05 PROCEDURE — 37000008 HC ANESTHESIA 1ST 15 MINUTES: Performed by: STUDENT IN AN ORGANIZED HEALTH CARE EDUCATION/TRAINING PROGRAM

## 2019-02-05 PROCEDURE — 22853 PR INSERT BIOMECH DEV W/INTERBODY ARTHRODESIS, EA CONTIGUOUS DEFECT: ICD-10-PCS | Mod: ,,, | Performed by: STUDENT IN AN ORGANIZED HEALTH CARE EDUCATION/TRAINING PROGRAM

## 2019-02-05 PROCEDURE — 63600175 PHARM REV CODE 636 W HCPCS: Performed by: STUDENT IN AN ORGANIZED HEALTH CARE EDUCATION/TRAINING PROGRAM

## 2019-02-05 PROCEDURE — 20936 PR AUTOGRAFT SPINE SURGERY LOCAL FROM SAME INCISION: ICD-10-PCS | Mod: ,,, | Performed by: STUDENT IN AN ORGANIZED HEALTH CARE EDUCATION/TRAINING PROGRAM

## 2019-02-05 PROCEDURE — 85610 PROTHROMBIN TIME: CPT

## 2019-02-05 PROCEDURE — 36620 INSERTION CATHETER ARTERY: CPT | Mod: 59,,, | Performed by: ANESTHESIOLOGY

## 2019-02-05 PROCEDURE — 22846 INSERT SPINE FIXATION DEVICE: CPT | Mod: 59,,, | Performed by: STUDENT IN AN ORGANIZED HEALTH CARE EDUCATION/TRAINING PROGRAM

## 2019-02-05 PROCEDURE — 25000003 PHARM REV CODE 250: Performed by: STUDENT IN AN ORGANIZED HEALTH CARE EDUCATION/TRAINING PROGRAM

## 2019-02-05 PROCEDURE — 20600001 HC STEP DOWN PRIVATE ROOM

## 2019-02-05 PROCEDURE — 36000710: Performed by: STUDENT IN AN ORGANIZED HEALTH CARE EDUCATION/TRAINING PROGRAM

## 2019-02-05 PROCEDURE — C1729 CATH, DRAINAGE: HCPCS | Performed by: STUDENT IN AN ORGANIZED HEALTH CARE EDUCATION/TRAINING PROGRAM

## 2019-02-05 PROCEDURE — 85025 COMPLETE CBC W/AUTO DIFF WBC: CPT

## 2019-02-05 PROCEDURE — D9220A PRA ANESTHESIA: ICD-10-PCS | Mod: CRNA,,, | Performed by: NURSE ANESTHETIST, CERTIFIED REGISTERED

## 2019-02-05 PROCEDURE — 80048 BASIC METABOLIC PNL TOTAL CA: CPT

## 2019-02-05 PROCEDURE — 86901 BLOOD TYPING SEROLOGIC RH(D): CPT

## 2019-02-05 PROCEDURE — 99499 UNLISTED E&M SERVICE: CPT | Mod: ,,, | Performed by: NEUROLOGICAL SURGERY

## 2019-02-05 PROCEDURE — 36620 ARTERIAL: ICD-10-PCS | Mod: 59,,, | Performed by: ANESTHESIOLOGY

## 2019-02-05 PROCEDURE — D9220A PRA ANESTHESIA: Mod: ANES,,, | Performed by: ANESTHESIOLOGY

## 2019-02-05 PROCEDURE — 22551 PR ARTHRODESIS ANT INTERBODY INC DISCECTOMY, CERVICAL BELOW C2: ICD-10-PCS | Mod: ,,, | Performed by: STUDENT IN AN ORGANIZED HEALTH CARE EDUCATION/TRAINING PROGRAM

## 2019-02-05 PROCEDURE — 36000711: Performed by: STUDENT IN AN ORGANIZED HEALTH CARE EDUCATION/TRAINING PROGRAM

## 2019-02-05 PROCEDURE — 71000033 HC RECOVERY, INTIAL HOUR: Performed by: STUDENT IN AN ORGANIZED HEALTH CARE EDUCATION/TRAINING PROGRAM

## 2019-02-05 PROCEDURE — 27800903 OPTIME MED/SURG SUP & DEVICES OTHER IMPLANTS: Performed by: STUDENT IN AN ORGANIZED HEALTH CARE EDUCATION/TRAINING PROGRAM

## 2019-02-05 PROCEDURE — 22552 PR ARTHRODESIS ANT INTERBODY INC DISCECTOMY, CERVICAL BELOW C2 EACH ADDL: ICD-10-PCS | Mod: ,,, | Performed by: STUDENT IN AN ORGANIZED HEALTH CARE EDUCATION/TRAINING PROGRAM

## 2019-02-05 PROCEDURE — 63600175 PHARM REV CODE 636 W HCPCS: Performed by: ANESTHESIOLOGY

## 2019-02-05 PROCEDURE — C1713 ANCHOR/SCREW BN/BN,TIS/BN: HCPCS | Performed by: STUDENT IN AN ORGANIZED HEALTH CARE EDUCATION/TRAINING PROGRAM

## 2019-02-05 PROCEDURE — 22551 ARTHRD ANT NTRBDY CERVICAL: CPT | Mod: ,,, | Performed by: STUDENT IN AN ORGANIZED HEALTH CARE EDUCATION/TRAINING PROGRAM

## 2019-02-05 PROCEDURE — D9220A PRA ANESTHESIA: Mod: CRNA,,, | Performed by: NURSE ANESTHETIST, CERTIFIED REGISTERED

## 2019-02-05 PROCEDURE — 27201423 OPTIME MED/SURG SUP & DEVICES STERILE SUPPLY: Performed by: STUDENT IN AN ORGANIZED HEALTH CARE EDUCATION/TRAINING PROGRAM

## 2019-02-05 PROCEDURE — 63600175 PHARM REV CODE 636 W HCPCS

## 2019-02-05 PROCEDURE — 20936 SP BONE AGRFT LOCAL ADD-ON: CPT | Mod: ,,, | Performed by: STUDENT IN AN ORGANIZED HEALTH CARE EDUCATION/TRAINING PROGRAM

## 2019-02-05 PROCEDURE — 22846 PR ANTERIOR INSTRUMENTATION 4-7 VERTEBRAL SEGMENTS: ICD-10-PCS | Mod: 59,,, | Performed by: STUDENT IN AN ORGANIZED HEALTH CARE EDUCATION/TRAINING PROGRAM

## 2019-02-05 PROCEDURE — 25000003 PHARM REV CODE 250: Performed by: NURSE ANESTHETIST, CERTIFIED REGISTERED

## 2019-02-05 PROCEDURE — D9220A PRA ANESTHESIA: ICD-10-PCS | Mod: ANES,,, | Performed by: ANESTHESIOLOGY

## 2019-02-05 PROCEDURE — 71000039 HC RECOVERY, EACH ADD'L HOUR: Performed by: STUDENT IN AN ORGANIZED HEALTH CARE EDUCATION/TRAINING PROGRAM

## 2019-02-05 PROCEDURE — 99499 NO LOS: ICD-10-PCS | Mod: ,,, | Performed by: NEUROLOGICAL SURGERY

## 2019-02-05 PROCEDURE — 63600175 PHARM REV CODE 636 W HCPCS: Performed by: NURSE ANESTHETIST, CERTIFIED REGISTERED

## 2019-02-05 PROCEDURE — 20930 SP BONE ALGRFT MORSEL ADD-ON: CPT | Mod: ,,, | Performed by: STUDENT IN AN ORGANIZED HEALTH CARE EDUCATION/TRAINING PROGRAM

## 2019-02-05 DEVICE — MATRIX BONE CELLR VIVIGEN 5CC: Type: IMPLANTABLE DEVICE | Site: NECK | Status: FUNCTIONAL

## 2019-02-05 DEVICE — IMPLANTABLE DEVICE: Type: IMPLANTABLE DEVICE | Site: NECK | Status: FUNCTIONAL

## 2019-02-05 RX ORDER — DEXAMETHASONE 4 MG/1
4 TABLET ORAL EVERY 6 HOURS
Status: DISPENSED | OUTPATIENT
Start: 2019-02-05 | End: 2019-02-06

## 2019-02-05 RX ORDER — FENTANYL CITRATE 50 UG/ML
INJECTION, SOLUTION INTRAMUSCULAR; INTRAVENOUS
Status: DISCONTINUED | OUTPATIENT
Start: 2019-02-05 | End: 2019-02-05

## 2019-02-05 RX ORDER — AMOXICILLIN 250 MG
1 CAPSULE ORAL 2 TIMES DAILY
Status: DISCONTINUED | OUTPATIENT
Start: 2019-02-05 | End: 2019-02-08 | Stop reason: HOSPADM

## 2019-02-05 RX ORDER — LIDOCAINE HYDROCHLORIDE AND EPINEPHRINE 10; 10 MG/ML; UG/ML
INJECTION, SOLUTION INFILTRATION; PERINEURAL
Status: DISCONTINUED | OUTPATIENT
Start: 2019-02-05 | End: 2019-02-05 | Stop reason: HOSPADM

## 2019-02-05 RX ORDER — METHYLPREDNISOLONE ACETATE 40 MG/ML
INJECTION, SUSPENSION INTRA-ARTICULAR; INTRALESIONAL; INTRAMUSCULAR; SOFT TISSUE
Status: DISCONTINUED | OUTPATIENT
Start: 2019-02-05 | End: 2019-02-05 | Stop reason: HOSPADM

## 2019-02-05 RX ORDER — CEFAZOLIN SODIUM 1 G/3ML
2 INJECTION, POWDER, FOR SOLUTION INTRAMUSCULAR; INTRAVENOUS
Status: COMPLETED | OUTPATIENT
Start: 2019-02-05 | End: 2019-02-05

## 2019-02-05 RX ORDER — BACITRACIN 50000 [IU]/1
INJECTION, POWDER, FOR SOLUTION INTRAMUSCULAR
Status: DISCONTINUED | OUTPATIENT
Start: 2019-02-05 | End: 2019-02-05 | Stop reason: HOSPADM

## 2019-02-05 RX ORDER — LABETALOL HCL 20 MG/4 ML
10 SYRINGE (ML) INTRAVENOUS EVERY 10 MIN PRN
Status: DISCONTINUED | OUTPATIENT
Start: 2019-02-05 | End: 2019-02-08 | Stop reason: HOSPADM

## 2019-02-05 RX ORDER — SODIUM CHLORIDE 9 MG/ML
INJECTION, SOLUTION INTRAVENOUS CONTINUOUS PRN
Status: DISCONTINUED | OUTPATIENT
Start: 2019-02-05 | End: 2019-02-05

## 2019-02-05 RX ORDER — ZOLPIDEM TARTRATE 5 MG/1
5 TABLET ORAL NIGHTLY PRN
Status: DISCONTINUED | OUTPATIENT
Start: 2019-02-05 | End: 2019-02-08 | Stop reason: HOSPADM

## 2019-02-05 RX ORDER — ONDANSETRON 4 MG/1
4 TABLET, FILM COATED ORAL EVERY 6 HOURS PRN
Status: DISCONTINUED | OUTPATIENT
Start: 2019-02-05 | End: 2019-02-08 | Stop reason: HOSPADM

## 2019-02-05 RX ORDER — ROCURONIUM BROMIDE 10 MG/ML
INJECTION, SOLUTION INTRAVENOUS
Status: DISCONTINUED | OUTPATIENT
Start: 2019-02-05 | End: 2019-02-05

## 2019-02-05 RX ORDER — SUCCINYLCHOLINE CHLORIDE 20 MG/ML
INJECTION INTRAMUSCULAR; INTRAVENOUS
Status: DISCONTINUED | OUTPATIENT
Start: 2019-02-05 | End: 2019-02-05

## 2019-02-05 RX ORDER — OXYCODONE AND ACETAMINOPHEN 5; 325 MG/1; MG/1
TABLET ORAL
Status: DISPENSED
Start: 2019-02-05 | End: 2019-02-06

## 2019-02-05 RX ORDER — CEFAZOLIN SODIUM 1 G/3ML
1 INJECTION, POWDER, FOR SOLUTION INTRAMUSCULAR; INTRAVENOUS
Status: DISCONTINUED | OUTPATIENT
Start: 2019-02-05 | End: 2019-02-08 | Stop reason: HOSPADM

## 2019-02-05 RX ORDER — MUPIROCIN 20 MG/G
1 OINTMENT TOPICAL
Status: COMPLETED | OUTPATIENT
Start: 2019-02-05 | End: 2019-02-05

## 2019-02-05 RX ORDER — ONDANSETRON 2 MG/ML
INJECTION INTRAMUSCULAR; INTRAVENOUS
Status: DISCONTINUED | OUTPATIENT
Start: 2019-02-05 | End: 2019-02-05

## 2019-02-05 RX ORDER — AMLODIPINE BESYLATE 2.5 MG/1
2.5 TABLET ORAL DAILY
Status: DISCONTINUED | OUTPATIENT
Start: 2019-02-06 | End: 2019-02-08 | Stop reason: HOSPADM

## 2019-02-05 RX ORDER — HEPARIN SODIUM 5000 [USP'U]/ML
5000 INJECTION, SOLUTION INTRAVENOUS; SUBCUTANEOUS EVERY 8 HOURS
Status: DISCONTINUED | OUTPATIENT
Start: 2019-02-06 | End: 2019-02-08 | Stop reason: HOSPADM

## 2019-02-05 RX ORDER — HYDROMORPHONE HYDROCHLORIDE 1 MG/ML
INJECTION, SOLUTION INTRAMUSCULAR; INTRAVENOUS; SUBCUTANEOUS
Status: COMPLETED
Start: 2019-02-05 | End: 2019-02-05

## 2019-02-05 RX ORDER — EPHEDRINE SULFATE 50 MG/ML
INJECTION, SOLUTION INTRAVENOUS
Status: DISCONTINUED | OUTPATIENT
Start: 2019-02-05 | End: 2019-02-05

## 2019-02-05 RX ORDER — KETAMINE HCL IN 0.9 % NACL 50 MG/5 ML
SYRINGE (ML) INTRAVENOUS
Status: DISCONTINUED | OUTPATIENT
Start: 2019-02-05 | End: 2019-02-05

## 2019-02-05 RX ORDER — PROPOFOL 10 MG/ML
VIAL (ML) INTRAVENOUS CONTINUOUS PRN
Status: DISCONTINUED | OUTPATIENT
Start: 2019-02-05 | End: 2019-02-05

## 2019-02-05 RX ORDER — DEXAMETHASONE SODIUM PHOSPHATE 4 MG/ML
INJECTION, SOLUTION INTRA-ARTICULAR; INTRALESIONAL; INTRAMUSCULAR; INTRAVENOUS; SOFT TISSUE
Status: DISCONTINUED | OUTPATIENT
Start: 2019-02-05 | End: 2019-02-05

## 2019-02-05 RX ORDER — HYDROMORPHONE HYDROCHLORIDE 1 MG/ML
1 INJECTION, SOLUTION INTRAMUSCULAR; INTRAVENOUS; SUBCUTANEOUS EVERY 4 HOURS PRN
Status: DISCONTINUED | OUTPATIENT
Start: 2019-02-05 | End: 2019-02-08 | Stop reason: HOSPADM

## 2019-02-05 RX ORDER — HYDROMORPHONE HYDROCHLORIDE 1 MG/ML
0.2 INJECTION, SOLUTION INTRAMUSCULAR; INTRAVENOUS; SUBCUTANEOUS EVERY 5 MIN PRN
Status: DISCONTINUED | OUTPATIENT
Start: 2019-02-05 | End: 2019-02-05 | Stop reason: HOSPADM

## 2019-02-05 RX ORDER — MUPIROCIN 20 MG/G
OINTMENT TOPICAL
Status: DISCONTINUED | OUTPATIENT
Start: 2019-02-05 | End: 2019-02-05

## 2019-02-05 RX ORDER — SODIUM CHLORIDE 0.9 % (FLUSH) 0.9 %
3 SYRINGE (ML) INJECTION
Status: DISCONTINUED | OUTPATIENT
Start: 2019-02-05 | End: 2019-02-05 | Stop reason: HOSPADM

## 2019-02-05 RX ORDER — LIDOCAINE HCL/PF 100 MG/5ML
SYRINGE (ML) INTRAVENOUS
Status: DISCONTINUED | OUTPATIENT
Start: 2019-02-05 | End: 2019-02-05

## 2019-02-05 RX ORDER — PROPOFOL 10 MG/ML
VIAL (ML) INTRAVENOUS
Status: DISCONTINUED | OUTPATIENT
Start: 2019-02-05 | End: 2019-02-05

## 2019-02-05 RX ORDER — OXYCODONE AND ACETAMINOPHEN 5; 325 MG/1; MG/1
1 TABLET ORAL EVERY 4 HOURS PRN
Status: DISCONTINUED | OUTPATIENT
Start: 2019-02-05 | End: 2019-02-08 | Stop reason: HOSPADM

## 2019-02-05 RX ORDER — MIDAZOLAM HYDROCHLORIDE 1 MG/ML
INJECTION, SOLUTION INTRAMUSCULAR; INTRAVENOUS
Status: DISCONTINUED | OUTPATIENT
Start: 2019-02-05 | End: 2019-02-05

## 2019-02-05 RX ADMIN — Medication 25 MG: at 10:02

## 2019-02-05 RX ADMIN — CEFAZOLIN 2 G: 330 INJECTION, POWDER, FOR SOLUTION INTRAMUSCULAR; INTRAVENOUS at 02:02

## 2019-02-05 RX ADMIN — PROPOFOL 40 MG: 10 INJECTION, EMULSION INTRAVENOUS at 01:02

## 2019-02-05 RX ADMIN — DEXAMETHASONE 4 MG: 4 TABLET ORAL at 06:02

## 2019-02-05 RX ADMIN — SUCCINYLCHOLINE CHLORIDE 120 MG: 20 INJECTION, SOLUTION INTRAMUSCULAR; INTRAVENOUS at 10:02

## 2019-02-05 RX ADMIN — EPHEDRINE SULFATE 10 MG: 50 INJECTION, SOLUTION INTRAMUSCULAR; INTRAVENOUS; SUBCUTANEOUS at 10:02

## 2019-02-05 RX ADMIN — FENTANYL CITRATE 25 MCG: 50 INJECTION, SOLUTION INTRAMUSCULAR; INTRAVENOUS at 03:02

## 2019-02-05 RX ADMIN — HYDROMORPHONE HYDROCHLORIDE 0.2 MG: 1 INJECTION, SOLUTION INTRAMUSCULAR; INTRAVENOUS; SUBCUTANEOUS at 03:02

## 2019-02-05 RX ADMIN — REMIFENTANIL HYDROCHLORIDE 0.15 MCG/KG/MIN: 1 INJECTION, POWDER, LYOPHILIZED, FOR SOLUTION INTRAVENOUS at 10:02

## 2019-02-05 RX ADMIN — PROPOFOL 30 MG: 10 INJECTION, EMULSION INTRAVENOUS at 11:02

## 2019-02-05 RX ADMIN — STANDARDIZED SENNA CONCENTRATE AND DOCUSATE SODIUM 1 TABLET: 8.6; 5 TABLET, FILM COATED ORAL at 08:02

## 2019-02-05 RX ADMIN — PROPOFOL 150 MCG/KG/MIN: 10 INJECTION, EMULSION INTRAVENOUS at 10:02

## 2019-02-05 RX ADMIN — HYDROMORPHONE HYDROCHLORIDE 1 MG: 1 INJECTION, SOLUTION INTRAMUSCULAR; INTRAVENOUS; SUBCUTANEOUS at 11:02

## 2019-02-05 RX ADMIN — Medication 10 MG: at 01:02

## 2019-02-05 RX ADMIN — Medication 10 MG: at 11:02

## 2019-02-05 RX ADMIN — FENTANYL CITRATE 100 MCG: 50 INJECTION, SOLUTION INTRAMUSCULAR; INTRAVENOUS at 10:02

## 2019-02-05 RX ADMIN — PROPOFOL 150 MG: 10 INJECTION, EMULSION INTRAVENOUS at 10:02

## 2019-02-05 RX ADMIN — CEFAZOLIN 1 G: 330 INJECTION, POWDER, FOR SOLUTION INTRAMUSCULAR; INTRAVENOUS at 11:02

## 2019-02-05 RX ADMIN — DEXAMETHASONE SODIUM PHOSPHATE 8 MG: 4 INJECTION, SOLUTION INTRAMUSCULAR; INTRAVENOUS at 11:02

## 2019-02-05 RX ADMIN — ONDANSETRON 4 MG: 2 INJECTION INTRAMUSCULAR; INTRAVENOUS at 02:02

## 2019-02-05 RX ADMIN — HYDROMORPHONE HYDROCHLORIDE 0.2 MG: 1 INJECTION, SOLUTION INTRAMUSCULAR; INTRAVENOUS; SUBCUTANEOUS at 05:02

## 2019-02-05 RX ADMIN — OXYCODONE HYDROCHLORIDE AND ACETAMINOPHEN 1 TABLET: 5; 325 TABLET ORAL at 08:02

## 2019-02-05 RX ADMIN — MIDAZOLAM HYDROCHLORIDE 2 MG: 1 INJECTION, SOLUTION INTRAMUSCULAR; INTRAVENOUS at 10:02

## 2019-02-05 RX ADMIN — Medication 5 MG: at 02:02

## 2019-02-05 RX ADMIN — SODIUM CHLORIDE, SODIUM GLUCONATE, SODIUM ACETATE, POTASSIUM CHLORIDE, MAGNESIUM CHLORIDE, SODIUM PHOSPHATE, DIBASIC, AND POTASSIUM PHOSPHATE: .53; .5; .37; .037; .03; .012; .00082 INJECTION, SOLUTION INTRAVENOUS at 10:02

## 2019-02-05 RX ADMIN — HYDROMORPHONE HYDROCHLORIDE 1 MG: 1 INJECTION, SOLUTION INTRAMUSCULAR; INTRAVENOUS; SUBCUTANEOUS at 07:02

## 2019-02-05 RX ADMIN — FENTANYL CITRATE 50 MCG: 50 INJECTION, SOLUTION INTRAMUSCULAR; INTRAVENOUS at 03:02

## 2019-02-05 RX ADMIN — LIDOCAINE HYDROCHLORIDE 60 MG: 20 INJECTION, SOLUTION INTRAVENOUS at 10:02

## 2019-02-05 RX ADMIN — SODIUM CHLORIDE: 0.9 INJECTION, SOLUTION INTRAVENOUS at 10:02

## 2019-02-05 RX ADMIN — ROCURONIUM BROMIDE 5 MG: 10 INJECTION, SOLUTION INTRAVENOUS at 10:02

## 2019-02-05 RX ADMIN — CEFAZOLIN 2 G: 330 INJECTION, POWDER, FOR SOLUTION INTRAMUSCULAR; INTRAVENOUS at 11:02

## 2019-02-05 RX ADMIN — MUPIROCIN 1 G: 20 OINTMENT TOPICAL at 09:02

## 2019-02-05 RX ADMIN — PROPOFOL 60 MG: 10 INJECTION, EMULSION INTRAVENOUS at 10:02

## 2019-02-05 RX ADMIN — HYDROMORPHONE HYDROCHLORIDE 0.2 MG: 1 INJECTION, SOLUTION INTRAMUSCULAR; INTRAVENOUS; SUBCUTANEOUS at 04:02

## 2019-02-05 RX ADMIN — OXYCODONE HYDROCHLORIDE AND ACETAMINOPHEN 1 TABLET: 5; 325 TABLET ORAL at 03:02

## 2019-02-05 RX ADMIN — ONDANSETRON 4 MG: 2 INJECTION INTRAMUSCULAR; INTRAVENOUS at 10:02

## 2019-02-05 NOTE — TRANSFER OF CARE
"Anesthesia Transfer of Care Note    Patient: Rabia Winn    Procedure(s) Performed: Procedure(s) (LRB):  DISCECTOMY, SPINE, CERVICAL, ANTERIOR APPROACH, WITH FUSION C3-4, 4-5, 5-6, 6-7 (N/A)    Transport from OR: Transported from OR on room air with adequate spontaneous ventilation          Last vitals:   Visit Vitals  BP (!) 141/86 (BP Location: Left arm, Patient Position: Lying)   Pulse 71   Temp 36.4 °C (97.5 °F) (Temporal)   Resp 20   Ht 5' 1" (1.549 m)   Wt 49 kg (108 lb)   SpO2 100%   Breastfeeding? No   BMI 20.41 kg/m²     "

## 2019-02-05 NOTE — ANESTHESIA PREPROCEDURE EVALUATION
02/05/2019  Rabia Winn is a 53 y.o., female.    Anesthesia Evaluation         Review of Systems  Anesthesia Hx:  No problems with previous Anesthesia   Social:  Non-Smoker, Alcohol Use    Cardiovascular:   Hypertension    Pulmonary:   Asthma    Musculoskeletal:   Arthritis   Spine Disorders: cervical Disc disease and Degenerative disease    Neurological:   Neuromuscular Disease, Seizures Myofascial muscle pain       Physical Exam  General:  Well nourished    Airway/Jaw/Neck:  Airway Findings: Mouth Opening: Normal Tongue: Normal  General Airway Assessment: Adult            Mental Status:  Mental Status Findings:  Alert and Oriented, Cooperative         Anesthesia Plan  Type of Anesthesia, risks & benefits discussed:  Anesthesia Type:  general  Patient's Preference:   Intra-op Monitoring Plan: standard ASA monitors  Intra-op Monitoring Plan Comments:   Post Op Pain Control Plan: multimodal analgesia  Post Op Pain Control Plan Comments:   Induction:   IV  Beta Blocker:  Patient is not currently on a Beta-Blocker (No further documentation required).       Informed Consent: Patient understands risks and agrees with Anesthesia plan.  Questions answered. Anesthesia consent signed with patient.  ASA Score: 2     Day of Surgery Review of History & Physical:    H&P update referred to the surgeon.         Ready For Surgery From Anesthesia Perspective.

## 2019-02-05 NOTE — INTERVAL H&P NOTE
The patient has been examined and the H&P has been reviewed:    I concur with the findings and no changes have occurred since H&P was written.    Anesthesia/Surgery risks, benefits and alternative options discussed and understood by patient/family.          Active Hospital Problems    Diagnosis  POA    Spondylolisthesis, cervical region [M43.12]  Yes      Resolved Hospital Problems   No resolved problems to display.

## 2019-02-05 NOTE — ANESTHESIA PROCEDURE NOTES
Arterial    Diagnosis: Cervical Discectomy     Patient location during procedure: done in OR  Procedure start time: 2/5/2019 10:45 AM  Timeout: 2/5/2019 10:44 AM  Procedure end time: 2/5/2019 10:45 AM  Staffing  Anesthesiologist: Danny Jacobson MD  Other anesthesia staff: Demarcus Ahn  Anesthesiologist was present at the time of the procedure.  Preanesthetic Checklist  Completed: patient identified, site marked, surgical consent, pre-op evaluation, timeout performed, IV checked, risks and benefits discussed, monitors and equipment checked and anesthesia consent givenArterial  Skin Prep: chlorhexidine gluconate  Local Infiltration: none  Orientation: left  Location: radial  Catheter Size: 20 G  Catheter placement by Anatomical landmarks. Heme positive aspiration all ports.Insertion Attempts: 1  Assessment  Dressing: secured with tape and tegaderm  Patient: Tolerated well

## 2019-02-05 NOTE — TRANSFER OF CARE
"Anesthesia Transfer of Care Note    Patient: Rabia Winn    Procedure(s) Performed: Procedure(s) (LRB):  DISCECTOMY, SPINE, CERVICAL, ANTERIOR APPROACH, WITH FUSION C3-4, 4-5, 5-6, 6-7 (N/A)    Patient location: PACU    Anesthesia Type: general    Transport from OR: Transported from OR on 6-10 L/min O2 by face mask with adequate spontaneous ventilation    Post pain: adequate analgesia    Post assessment: no apparent anesthetic complications    Post vital signs: stable    Level of consciousness: awake    Nausea/Vomiting: no nausea/vomiting    Complications: none    Transfer of care protocol was followed      Last vitals:   Visit Vitals  BP (!) 141/86 (BP Location: Left arm, Patient Position: Lying)   Pulse 71   Temp 36.4 °C (97.5 °F) (Temporal)   Resp 20   Ht 5' 1" (1.549 m)   Wt 49 kg (108 lb)   SpO2 100%   Breastfeeding? No   BMI 20.41 kg/m²     "

## 2019-02-06 LAB
ANION GAP SERPL CALC-SCNC: 6 MMOL/L
BASOPHILS # BLD AUTO: 0.01 K/UL
BASOPHILS NFR BLD: 0.1 %
BUN SERPL-MCNC: 16 MG/DL
CALCIUM SERPL-MCNC: 9.1 MG/DL
CHLORIDE SERPL-SCNC: 101 MMOL/L
CO2 SERPL-SCNC: 28 MMOL/L
CREAT SERPL-MCNC: 0.6 MG/DL
DIFFERENTIAL METHOD: ABNORMAL
EOSINOPHIL # BLD AUTO: 0 K/UL
EOSINOPHIL NFR BLD: 0 %
ERYTHROCYTE [DISTWIDTH] IN BLOOD BY AUTOMATED COUNT: 11.9 %
EST. GFR  (AFRICAN AMERICAN): >60 ML/MIN/1.73 M^2
EST. GFR  (NON AFRICAN AMERICAN): >60 ML/MIN/1.73 M^2
GLUCOSE SERPL-MCNC: 140 MG/DL
HCT VFR BLD AUTO: 32 %
HGB BLD-MCNC: 10.9 G/DL
IMM GRANULOCYTES # BLD AUTO: 0.05 K/UL
IMM GRANULOCYTES NFR BLD AUTO: 0.6 %
LYMPHOCYTES # BLD AUTO: 0.5 K/UL
LYMPHOCYTES NFR BLD: 5.7 %
MAGNESIUM SERPL-MCNC: 2.4 MG/DL
MCH RBC QN AUTO: 32.2 PG
MCHC RBC AUTO-ENTMCNC: 34.1 G/DL
MCV RBC AUTO: 94 FL
MONOCYTES # BLD AUTO: 0.5 K/UL
MONOCYTES NFR BLD: 5.9 %
NEUTROPHILS # BLD AUTO: 6.9 K/UL
NEUTROPHILS NFR BLD: 87.7 %
NRBC BLD-RTO: 0 /100 WBC
PHOSPHATE SERPL-MCNC: 3.9 MG/DL
PLATELET # BLD AUTO: 236 K/UL
PMV BLD AUTO: 10.8 FL
POTASSIUM SERPL-SCNC: 4.2 MMOL/L
RBC # BLD AUTO: 3.39 M/UL
SODIUM SERPL-SCNC: 135 MMOL/L
WBC # BLD AUTO: 7.83 K/UL

## 2019-02-06 PROCEDURE — 20600001 HC STEP DOWN PRIVATE ROOM

## 2019-02-06 PROCEDURE — 36415 COLL VENOUS BLD VENIPUNCTURE: CPT

## 2019-02-06 PROCEDURE — 97165 OT EVAL LOW COMPLEX 30 MIN: CPT

## 2019-02-06 PROCEDURE — 80048 BASIC METABOLIC PNL TOTAL CA: CPT

## 2019-02-06 PROCEDURE — 85025 COMPLETE CBC W/AUTO DIFF WBC: CPT

## 2019-02-06 PROCEDURE — 63600175 PHARM REV CODE 636 W HCPCS: Performed by: STUDENT IN AN ORGANIZED HEALTH CARE EDUCATION/TRAINING PROGRAM

## 2019-02-06 PROCEDURE — 84100 ASSAY OF PHOSPHORUS: CPT

## 2019-02-06 PROCEDURE — 83735 ASSAY OF MAGNESIUM: CPT

## 2019-02-06 PROCEDURE — 97161 PT EVAL LOW COMPLEX 20 MIN: CPT

## 2019-02-06 PROCEDURE — 25000003 PHARM REV CODE 250: Performed by: STUDENT IN AN ORGANIZED HEALTH CARE EDUCATION/TRAINING PROGRAM

## 2019-02-06 RX ORDER — DIAZEPAM 5 MG/5ML
5 SOLUTION ORAL EVERY 8 HOURS PRN
Status: ACTIVE | OUTPATIENT
Start: 2019-02-06 | End: 2019-02-06

## 2019-02-06 RX ORDER — CEFAZOLIN SODIUM 1 G/3ML
2 INJECTION, POWDER, FOR SOLUTION INTRAMUSCULAR; INTRAVENOUS
Status: DISCONTINUED | OUTPATIENT
Start: 2019-02-06 | End: 2019-02-08 | Stop reason: HOSPADM

## 2019-02-06 RX ORDER — DEXAMETHASONE 4 MG/1
4 TABLET ORAL EVERY 6 HOURS
Status: DISCONTINUED | OUTPATIENT
Start: 2019-02-06 | End: 2019-02-06

## 2019-02-06 RX ORDER — LIDOCAINE HYDROCHLORIDE 10 MG/ML
1 INJECTION, SOLUTION EPIDURAL; INFILTRATION; INTRACAUDAL; PERINEURAL ONCE
Status: DISCONTINUED | OUTPATIENT
Start: 2019-02-06 | End: 2019-02-08 | Stop reason: HOSPADM

## 2019-02-06 RX ORDER — DEXAMETHASONE SODIUM PHOSPHATE 4 MG/ML
4 INJECTION, SOLUTION INTRA-ARTICULAR; INTRALESIONAL; INTRAMUSCULAR; INTRAVENOUS; SOFT TISSUE EVERY 6 HOURS
Status: DISCONTINUED | OUTPATIENT
Start: 2019-02-06 | End: 2019-02-08 | Stop reason: HOSPADM

## 2019-02-06 RX ORDER — SODIUM CHLORIDE, SODIUM LACTATE, POTASSIUM CHLORIDE, CALCIUM CHLORIDE 600; 310; 30; 20 MG/100ML; MG/100ML; MG/100ML; MG/100ML
INJECTION, SOLUTION INTRAVENOUS CONTINUOUS
Status: DISCONTINUED | OUTPATIENT
Start: 2019-02-06 | End: 2019-02-06

## 2019-02-06 RX ADMIN — OXYCODONE HYDROCHLORIDE AND ACETAMINOPHEN 1 TABLET: 5; 325 TABLET ORAL at 07:02

## 2019-02-06 RX ADMIN — CEFAZOLIN 1 G: 330 INJECTION, POWDER, FOR SOLUTION INTRAMUSCULAR; INTRAVENOUS at 09:02

## 2019-02-06 RX ADMIN — OXYCODONE HYDROCHLORIDE AND ACETAMINOPHEN 1 TABLET: 5; 325 TABLET ORAL at 05:02

## 2019-02-06 RX ADMIN — HEPARIN SODIUM 5000 UNITS: 5000 INJECTION, SOLUTION INTRAVENOUS; SUBCUTANEOUS at 09:02

## 2019-02-06 RX ADMIN — OXYCODONE HYDROCHLORIDE AND ACETAMINOPHEN 1 TABLET: 5; 325 TABLET ORAL at 09:02

## 2019-02-06 RX ADMIN — ONDANSETRON 4 MG: 4 TABLET, FILM COATED ORAL at 05:02

## 2019-02-06 RX ADMIN — CEFAZOLIN 1 G: 330 INJECTION, POWDER, FOR SOLUTION INTRAMUSCULAR; INTRAVENOUS at 02:02

## 2019-02-06 RX ADMIN — AMLODIPINE BESYLATE 2.5 MG: 2.5 TABLET ORAL at 08:02

## 2019-02-06 RX ADMIN — HYDROMORPHONE HYDROCHLORIDE 1 MG: 1 INJECTION, SOLUTION INTRAMUSCULAR; INTRAVENOUS; SUBCUTANEOUS at 08:02

## 2019-02-06 RX ADMIN — HYDROMORPHONE HYDROCHLORIDE 1 MG: 1 INJECTION, SOLUTION INTRAMUSCULAR; INTRAVENOUS; SUBCUTANEOUS at 06:02

## 2019-02-06 RX ADMIN — STANDARDIZED SENNA CONCENTRATE AND DOCUSATE SODIUM 1 TABLET: 8.6; 5 TABLET, FILM COATED ORAL at 09:02

## 2019-02-06 RX ADMIN — HYDROMORPHONE HYDROCHLORIDE 1 MG: 1 INJECTION, SOLUTION INTRAMUSCULAR; INTRAVENOUS; SUBCUTANEOUS at 03:02

## 2019-02-06 RX ADMIN — HEPARIN SODIUM 5000 UNITS: 5000 INJECTION, SOLUTION INTRAVENOUS; SUBCUTANEOUS at 05:02

## 2019-02-06 RX ADMIN — ONDANSETRON 4 MG: 4 TABLET, FILM COATED ORAL at 12:02

## 2019-02-06 RX ADMIN — HEPARIN SODIUM 5000 UNITS: 5000 INJECTION, SOLUTION INTRAVENOUS; SUBCUTANEOUS at 02:02

## 2019-02-06 RX ADMIN — CEFAZOLIN 1 G: 330 INJECTION, POWDER, FOR SOLUTION INTRAMUSCULAR; INTRAVENOUS at 05:02

## 2019-02-06 RX ADMIN — OXYCODONE HYDROCHLORIDE AND ACETAMINOPHEN 1 TABLET: 5; 325 TABLET ORAL at 12:02

## 2019-02-06 RX ADMIN — DEXAMETHASONE SODIUM PHOSPHATE 4 MG: 4 INJECTION, SOLUTION INTRA-ARTICULAR; INTRALESIONAL; INTRAMUSCULAR; INTRAVENOUS; SOFT TISSUE at 12:02

## 2019-02-06 RX ADMIN — DEXAMETHASONE SODIUM PHOSPHATE 4 MG: 4 INJECTION, SOLUTION INTRA-ARTICULAR; INTRALESIONAL; INTRAMUSCULAR; INTRAVENOUS; SOFT TISSUE at 06:02

## 2019-02-06 RX ADMIN — HYDROMORPHONE HYDROCHLORIDE 1 MG: 1 INJECTION, SOLUTION INTRAMUSCULAR; INTRAVENOUS; SUBCUTANEOUS at 12:02

## 2019-02-06 RX ADMIN — STANDARDIZED SENNA CONCENTRATE AND DOCUSATE SODIUM 1 TABLET: 8.6; 5 TABLET, FILM COATED ORAL at 08:02

## 2019-02-06 RX ADMIN — OXYCODONE HYDROCHLORIDE AND ACETAMINOPHEN 1 TABLET: 5; 325 TABLET ORAL at 02:02

## 2019-02-06 RX ADMIN — HYDROMORPHONE HYDROCHLORIDE 1 MG: 1 INJECTION, SOLUTION INTRAMUSCULAR; INTRAVENOUS; SUBCUTANEOUS at 10:02

## 2019-02-06 RX ADMIN — DEXAMETHASONE 4 MG: 4 TABLET ORAL at 12:02

## 2019-02-06 NOTE — NURSING TRANSFER
Nursing Transfer Note      2/5/2019     Transfer To: xray then Rm 730A    Transfer via stretcher    Transfer with  to O2 2L    Transported by escort    Medicines sent: n/a    Chart send with patient: Yes    Notified: spouse    Patient reassessed at: 2/5/19    Upon arrival to floor:

## 2019-02-06 NOTE — PLAN OF CARE
Problem: Occupational Therapy Goal  Goal: Occupational Therapy Goal  Goals to be met by: 7 days (2/13/19)     Patient will increase functional independence with ADLs by performing:    UE Dressing with Supervision.  LE Dressing with Supervision.  Grooming while standing at sink with Supervision.  Toileting from toilet with Supervision for hygiene and clothing management.   Supine to sit with Supervision.  Toilet transfer to toilet with Supervision.  Pt will complete functional mobility household distance with Supervision using AD as needed.    Outcome: Ongoing (interventions implemented as appropriate)  Eval and POC set 2/6/19

## 2019-02-06 NOTE — PLAN OF CARE
Problem: Physical Therapy Goal  Goal: Physical Therapy Goal  Goals to be met by: 19     Patient will increase functional independence with mobility by performin. Supine to sit with Stand-by Assistance  2. Sit to stand transfer with Stand-by Assistance  3. Gait  x 150 feet with Stand-by Assistance using AD as needed.   4. Lower extremity exercise program x15 reps, with supervision, in order to increase LE strength and (I) with functional mobility.     Outcome: Ongoing (interventions implemented as appropriate)  PT evaluation complete and appropriate goals established.    Tere Duarte, PT, DPT   2019  [phone]

## 2019-02-06 NOTE — PT/OT/SLP EVAL
Physical Therapy Evaluation    Patient Name:  Rabia Winn   MRN:  [id number]    Recommendations:     Discharge Recommendations:  ( PT/OT)   Discharge Equipment Recommendations: walker, rolling   Barriers to discharge: None    Assessment:     Rabia Winn is a 53 y.o. female admitted with a medical diagnosis of Spondylolisthesis, cervical region.  She presents with the following impairments/functional limitations:  weakness, impaired functional mobilty, impaired endurance, gait instability, impaired balance, impaired self care skills, pain, impaired cardiopulmonary response to activity, decreased lower extremity function, decreased upper extremity function. Pt completing functional mobility with CGA-Rosalinda. Ambulated short distance within room with mild instability noted throughout. Required multiple standing rest breaks throughout gait and further distance limited 2* c/o dizziness. Pt would continue to benefit from skilled acute PT in order to address current deficits and progress functional mobility.     Rehab Prognosis: Good; patient would benefit from acute skilled PT services to address these deficits and reach maximum level of function.    Recent Surgery: Procedure(s) (LRB):  DISCECTOMY, SPINE, CERVICAL, ANTERIOR APPROACH, WITH FUSION C3-4, 4-5, 5-6, 6-7 (N/A) 1 Day Post-Op    Plan:     During this hospitalization, patient to be seen 3 x/week to address the identified rehab impairments via gait training, therapeutic activities, therapeutic exercises, neuromuscular re-education and progress toward the following goals:    · Plan of Care Expires:  03/07/19    Subjective     Chief Complaint: dizziness and back pain   Patient/Family Comments/goals: return home   Pain/Comfort:  · Pain Rating 1: 4/10  · Location 1: back    Patients cultural, spiritual, Lutheran conflicts given the current situation: no    Living Environment:  Pt lives with her  in a 1SH with  to enter.   Prior to admission,  patients level of function was independent; driving and working.  Equipment used at home: (built-in shower chair).  DME owned (not currently used): none.  Upon discharge, patient will have assistance from .    Objective:     Communicated with RN prior to session.  Patient found supine with cervical collar, PureWick(drain)  upon PT entry to room.    General Precautions: Standard, fall   Orthopedic Precautions:spinal precautions   Braces: Cervical collar     Exams:  · Cognitive Exam:  Patient is oriented to Person, Place, Time and Situation  · Sensation:    · -       Intact  · RLE ROM: WFL  · RLE Strength: grossly 4-/5 in all planes (weakness noted compared to LLE)  · LLE ROM: WFL  · LLE Strength: grossly 4+/5 in all planes    Functional Mobility:  · Bed Mobility:     · Rolling Left:  minimum assistance and with side rail  · Supine to Sit: minimum assistance via log-roll technique   · Transfers:     · Sit to Stand:  contact guard assistance with no AD  · Gait: ~25 ft. with HHAx2 and Rosalinda  · Multiple standing rest breaks 2* dizziness  · Decreased sona, impaired weight-shifting ability, mild instability, decreased fluidity of steps       Therapeutic Activities and Exercises:   Pt and  educated on: role of PT and PT POC; c-collar wear; spinal precautions; log-roll technique; importance of OOB activity. Pt and  verbalized understanding.   Pt and  instructed to have staff assist for transfers and ambulation at this time. Both v/u.     AM-PAC 6 CLICK MOBILITY  Total Score:17     Patient left up in chair with all lines intact, call button in reach and pt's  present.    GOALS:   Multidisciplinary Problems     Physical Therapy Goals        Problem: Physical Therapy Goal    Goal Priority Disciplines Outcome Goal Variances Interventions   Physical Therapy Goal     PT, PT/OT Ongoing (interventions implemented as appropriate)     Description:  Goals to be met by: 2/16/19     Patient will  increase functional independence with mobility by performin. Supine to sit with Stand-by Assistance  2. Sit to stand transfer with Stand-by Assistance  3. Gait  x 150 feet with Stand-by Assistance using AD as needed.   4. Lower extremity exercise program x15 reps, with supervision, in order to increase LE strength and (I) with functional mobility.                       History:     Past Medical History:   Diagnosis Date    Arthritis     Asthma     DDD (degenerative disc disease), cervical     DDD (degenerative disc disease), lumbar     Epilepsy     HTN (hypertension)        Past Surgical History:   Procedure Laterality Date    Block-nerve-medial branch-cervical C3-6 Bilateral 2018    [facility]    GANGLION CYST EXCISION Right     right breast    HYSTERECTOMY         Clinical Decision Making:     History  Co-morbidities and personal factors that may impact the plan of care Examination  Body Structures and Functions, activity limitations and participation restrictions that may impact the plan of care Clinical Presentation   Decision Making/ Complexity Score   Co-morbidities:   [x] Time since onset of injury / illness / exacerbation  [x] Status of current condition  []Patient's cognitive status and safety concerns    [] Multiple Medical Problems (see med hx)  Personal Factors:   [] Patient's age  [] Prior Level of function   [] Patient's home situation (environment and family support)  [] Patient's level of motivation  [] Expected progression of patient      HISTORY:(criteria)    [] 24508 - no personal factors/history    [x] 80874 - has 1-2 personal factor/comorbidity     [] 57065 - has >3 personal factor/comorbidity     Body Regions:  [] Objective examination findings  [] Head     []  Neck  [] Trunk   [] Upper Extremity  [x] Lower Extremity    Body Systems:  [] For communication ability, affect, cognition, language, and learning style: the assessment of the ability  to make needs known, consciousness, orientation (person, place, and time), expected emotional /behavioral responses, and learning preferences (eg, learning barriers, education  needs)  [x] For the neuromuscular system: a general assessment of gross coordinated movement (eg, balance, gait, locomotion, transfers, and transitions) and motor function  (motor control and motor learning)  [x] For the musculoskeletal system: the assessment of gross symmetry, gross range of motion, gross strength, height, and weight  [] For the integumentary system: the assessment of pliability(texture), presence of scar formation, skin color, and skin integrity  [x] For cardiovascular/pulmonary system: the assessment of heart rate, respiratory rate, blood pressure, and edema     Activity limitations:    [] Patient's cognitive status and saf ety concerns          [x] Status of current condition      [] Weight bearing restriction  [x] Cardiopulmunary Restriction    Participation Restrictions:   [] Goals and goal agreement with the patient     [] Rehab potential (prognosis) and probable outcome      Examination of Body System: (criteria)    [] 74805 - addressing 1-2 elements    [] 97377 - addressing a total of 3 or more elements     [x] 81254 -  Addressing a total of 4 or more elements         Clinical Presentation: (criteria)  Stable - 42881     On examination of body system using standardized tests and measures patient presents with 4 or more elements from any of the following: body structures and functions, activity limitations, and/or participation restrictions.  Leading to a clinical presentation that is considered stable and/or uncomplicated                              Clinical Decision Making  (Eval Complexity):  Low- 54176     Time Tracking:     PT Received On: 02/06/19  PT Start Time: 0859     PT Stop Time: 0921  PT Total Time (min): 22 min     Billable Minutes: Evaluation 22  (co-eval with OT)    Tere Duarte, PT, DPT    2/6/2019  [phone]

## 2019-02-06 NOTE — NURSING
Drain from neck removed/ tolerated well/ secured with gauze dressing and tegaderm after hemostasis noted/ 9cc total drainage from 0700 am til 13:00

## 2019-02-06 NOTE — PLAN OF CARE
Problem: Adult Inpatient Plan of Care  Goal: Plan of Care Review  Outcome: Ongoing (interventions implemented as appropriate)  Patient aaox4, YEE, RUE n/t/weakness improving t/o shift. BLE slight but equal weakness. Medicated with q4 percocet/dilaudid to maintain moderate relief / pain control. Swallowing clears with some discomfort still, liquid diet maintained. VSS, HRR, lungs cta on 2L NC. Voiding cyu via external catheter w/o difficulty/complaint. Fall risk and safety precautions initiated, maintained. Will continue to monitor.

## 2019-02-06 NOTE — ANESTHESIA POSTPROCEDURE EVALUATION
"Anesthesia Post Evaluation    Patient: Rabia Winn    Procedure(s) Performed: Procedure(s) (LRB):  DISCECTOMY, SPINE, CERVICAL, ANTERIOR APPROACH, WITH FUSION C3-4, 4-5, 5-6, 6-7 (N/A)    Final Anesthesia Type: general  Patient location during evaluation: PACU  Patient participation: Yes- Able to Participate  Level of consciousness: awake and alert  Post-procedure vital signs: reviewed and stable  Pain management: adequate  Airway patency: patent  PONV status at discharge: No PONV  Anesthetic complications: no      Cardiovascular status: blood pressure returned to baseline  Respiratory status: unassisted, spontaneous ventilation and room air  Hydration status: euvolemic          Visit Vitals  /76   Pulse 77   Temp 36 °C (96.8 °F)   Resp 16   Ht 5' 1" (1.549 m)   Wt 49 kg (108 lb 0.4 oz)   SpO2 95%   Breastfeeding? No   BMI 20.41 kg/m²       Pain/Beverly Score: Pain Rating Prior to Med Admin: 5 (2/6/2019  5:10 AM)  Pain Rating Post Med Admin: 5 (2/6/2019  6:00 AM)        "

## 2019-02-06 NOTE — PROGRESS NOTES
[facility]  Neurosurgery  Progress Note    Subjective:     History of Present Illness: No notes on file    Post-Op Info:  Procedure(s) (LRB):  DISCECTOMY, SPINE, CERVICAL, ANTERIOR APPROACH, WITH FUSION C3-4, 4-5, 5-6, 6-7 (N/A)   1 Day Post-Op     Medications Prior to Admission   Medication Sig Dispense Refill Last Dose    amLODIPine (NORVASC) 5 MG tablet Take 0.5 tablets (2.5 mg total) by mouth once daily. 30 tablet 3 2/5/2019 at 0230    chlorhexidine (PERIDEX) 0.12 % solution Use as directed 10 mLs in the mouth or throat 2 (two) times daily.   2/5/2019 at 0230    gabapentin (NEURONTIN) 100 MG capsule Take 100 mg by mouth once.   2/5/2019 at 0230    mupirocin (BACTROBAN) 2 % ointment Apply topically 2 (two) times daily.   2/5/2019 at 0230    zolpidem (AMBIEN) 5 MG Tab Take 1 tablet (5 mg total) by mouth nightly as needed. 30 tablet 0 2/4/2019 at 2100    celecoxib (CELEBREX) 200 MG capsule Take 1 capsule (200 mg total) by mouth daily as needed for Pain. 30 capsule 1 1/15/2019       Review of patient's allergies indicates:  No Known Allergies    Past Medical History:   Diagnosis Date    Arthritis     Asthma     DDD (degenerative disc disease), cervical     DDD (degenerative disc disease), lumbar     Epilepsy     HTN (hypertension)      Past Surgical History:   Procedure Laterality Date    Block-nerve-medial branch-cervical C3-6 Bilateral 12/19/2018    Performed by [facility] at [facility] OR    GANGLION CYST EXCISION Right     right breast    HYSTERECTOMY       Family History     None        Tobacco Use    Smoking status: Never Smoker    Smokeless tobacco: Never Used   Substance and Sexual Activity    Alcohol use: Yes     Comment: rarely    Drug use: No    Sexual activity: Not on file     Review of Systems  Objective:     Weight: 49 kg (108 lb 0.4 oz)  Body mass index is 20.41 kg/m².  Vital Signs (Most Recent):  Temp: 96.8 °F (36 °C) (02/06/19 0355)  Pulse: 77 (02/06/19  0355)  Resp: 16 (02/06/19 0355)  BP: 132/76 (02/06/19 0355)  SpO2: 95 % (02/06/19 0355) Vital Signs (24h Range):  Temp:  [96.8 °F (36 °C)-98.8 °F (37.1 °C)] 96.8 °F (36 °C)  Pulse:  [71-95] 77  Resp:  [14-20] 16  SpO2:  [91 %-100 %] 95 %  BP: (118-148)/(64-92) 132/76                           Closed/Suction Drain 02/05/19 1439 Neck Other (Comment) 7 Fr. (Active)   Site Description Unable to view 2/5/2019  6:30 PM   Dressing Type Gauze;Transparent 2/5/2019  6:30 PM   Dressing Status Clean;Dry;Intact 2/5/2019  6:30 PM   Drainage Bloody 2/5/2019  6:30 PM   Status Other (Comment) 2/5/2019  6:30 PM   Output (mL) 5 mL 2/6/2019  6:00 AM       Neurosurgery Physical Exam  E4V5M6  CN II-XII grossly intact  Pain limited weakness on R though full strength with effort; 5/5 throughout   SILT  Collar in place     Significant Labs:  Recent Labs   Lab 02/05/19  0911 02/06/19  0404   GLU 93 140*    135*   K 4.3 4.2    101   CO2 25 28   BUN 20 16   CREATININE 0.7 0.6   CALCIUM 9.5 9.1   MG  --  2.4     Recent Labs   Lab 02/05/19  0911 02/06/19  0404   WBC 4.29 7.83   HGB 11.7* 10.9*   HCT 34.6* 32.0*    236     Recent Labs   Lab 02/05/19  0911   INR 1.0     Microbiology Results (last 7 days)     ** No results found for the last 168 hours. **        Recent Lab Results       02/06/19  0404   02/05/19  0911        Immature Granulocytes 0.6 0.2     Immature Grans (Abs) 0.05  Comment:  Mild elevation in immature granulocytes is non specific and   can be seen in a variety of conditions including stress response,   acute inflammation, trauma and pregnancy. Correlation with other   laboratory and clinical findings is essential.   0.01  Comment:  Mild elevation in immature granulocytes is non specific and   can be seen in a variety of conditions including stress response,   acute inflammation, trauma and pregnancy. Correlation with other   laboratory and clinical findings is essential.       Anion Gap 6 9     Baso # 0.01  0.04     Basophil% 0.1 0.9     BUN, Bld 16 20     Calcium 9.1 9.5     Chloride 101 105     CO2 28 25     Creatinine 0.6 0.7     Differential Method Automated Automated     eGFR if  >60.0 >60.0     eGFR if non  >60.0  Comment:  Calculation used to obtain the estimated glomerular filtration  rate (eGFR) is the CKD-EPI equation.    >60.0  Comment:  Calculation used to obtain the estimated glomerular filtration  rate (eGFR) is the CKD-EPI equation.        Eos # 0.0 0.1     Eosinophil% 0.0 1.9     Glucose 140 93     Gran # (ANC) 6.9 2.1     Gran% 87.7 49.5     Group & Rh   O POS     Hematocrit 32.0 34.6     Hemoglobin 10.9 11.7     INDIRECT MATEO   NEG     Coumadin Monitoring INR   1.0  Comment:  Coumadin Therapy:  2.0 - 3.0 for INR for all indicators except mechanical heart valves  and antiphospholipid syndromes which should use 2.5 - 3.5.       Lymph # 0.5 1.6     Lymph% 5.7 36.1     Magnesium 2.4       MCH 32.2 31.5     MCHC 34.1 33.8     MCV 94 93     Mono # 0.5 0.5     Mono% 5.9 11.4     MPV 10.8 10.8     nRBC 0 0     Phosphorus 3.9       Platelets 236 232     Potassium 4.2 4.3     Protime   10.0     RBC 3.39 3.72     RDW 11.9 11.9     Sodium 135 139     WBC 7.83 4.29           Significant Diagnostics:  CT: No results found in the last 24 hours.  MRI: No results found in the last 24 hours.    Assessment/Plan:     * Spondylolisthesis, cervical region    -cont drain  -collar x 2 weeks then soft collar  -SBP<160  -IS to bedside  -ADAT  -PT/OT         Dillon Barth MD  Neurosurgery  [facility]   Principal Discharge DX:	Anemia due to blood loss  Goal:	Hgb 12-16  Assessment and plan of treatment:	You came in with reports of black stools for 2 weeks and symptoms of lightheadedness and fatigue. Please follow up with your primary doctor. Principal Discharge DX:	Anemia due to blood loss  Goal:	Hgb 12-16  Assessment and plan of treatment:	You came in with reports of black stools for 2 weeks and symptoms of lightheadedness and fatigue. Please follow up with your primary doctor in 1-2 weeks. Make sure to have your iron studies redone and hemoglobin checked eventually. Continue to take ___PPI___ . Principal Discharge DX:	Anemia due to blood loss  Goal:	Hgb 12-16  Assessment and plan of treatment:	You came in with reports of black stools for 2 weeks and symptoms of lightheadedness and fatigue. Please follow up with your primary doctor in 1-2 weeks. Make sure to have your iron studies redone and hemoglobin checked eventually. Continue to take pantoprazole once a day, 30 mins before breakfast for a month. Please follow up with GI on January 22, 2019. Principal Discharge DX:	Anemia due to blood loss  Goal:	Hgb 12-16  Assessment and plan of treatment:	You came in with reports of black stools for 2 weeks and symptoms of lightheadedness and fatigue. Please follow up with your primary doctor in 1-2 weeks. Make sure to have your iron studies redone and hemoglobin checked eventually. Continue to take pantoprazole once a day, 30 mins before breakfast for a month. Please follow up with GI within the next 1-2 weeks.

## 2019-02-06 NOTE — PLAN OF CARE
Problem: Adult Inpatient Plan of Care  Goal: Plan of Care Review  Outcome: Ongoing (interventions implemented as appropriate)  Pain controlled with medication throughout shift/ vss/  at bedside/ safety maintained/ comfort measures given. Aaox4.  Will continue to monitor patient

## 2019-02-06 NOTE — SUBJECTIVE & OBJECTIVE
Medications Prior to Admission   Medication Sig Dispense Refill Last Dose    amLODIPine (NORVASC) 5 MG tablet Take 0.5 tablets (2.5 mg total) by mouth once daily. 30 tablet 3 2/5/2019 at 0230    chlorhexidine (PERIDEX) 0.12 % solution Use as directed 10 mLs in the mouth or throat 2 (two) times daily.   2/5/2019 at 0230    gabapentin (NEURONTIN) 100 MG capsule Take 100 mg by mouth once.   2/5/2019 at 0230    mupirocin (BACTROBAN) 2 % ointment Apply topically 2 (two) times daily.   2/5/2019 at 0230    zolpidem (AMBIEN) 5 MG Tab Take 1 tablet (5 mg total) by mouth nightly as needed. 30 tablet 0 2/4/2019 at 2100    celecoxib (CELEBREX) 200 MG capsule Take 1 capsule (200 mg total) by mouth daily as needed for Pain. 30 capsule 1 1/15/2019       Review of patient's allergies indicates:  No Known Allergies    Past Medical History:   Diagnosis Date    Arthritis     Asthma     DDD (degenerative disc disease), cervical     DDD (degenerative disc disease), lumbar     Epilepsy     HTN (hypertension)      Past Surgical History:   Procedure Laterality Date    Block-nerve-medial branch-cervical C3-6 Bilateral 12/19/2018    [facility]les [facility]    GANGLION CYST EXCISION Right     right breast    HYSTERECTOMY       Family History     None        Tobacco Use    Smoking status: Never Smoker    Smokeless tobacco: Never Used   Substance and Sexual Activity    Alcohol use: Yes     Comment: rarely    Drug use: No    Sexual activity: Not on file     Review of Systems  Objective:     Weight: 49 kg (108 lb 0.4 oz)  Body mass index is 20.41 kg/m².  Vital Signs (Most Recent):  Temp: 96.8 °F (36 °C) (02/06/19 0355)  Pulse: 77 (02/06/19 0355)  Resp: 16 (02/06/19 0355)  BP: 132/76 (02/06/19 0355)  SpO2: 95 % (02/06/19 0355) Vital Signs (24h Range):  Temp:  [96.8 °F (36 °C)-98.8 °F (37.1 °C)] 96.8 °F (36 °C)  Pulse:  [71-95] 77  Resp:  [14-20] 16  SpO2:  [91 %-100 %] 95 %  BP: (118-148)/(64-92) 132/76                            Closed/Suction Drain 02/05/19 1439 Neck Other (Comment) 7 Fr. (Active)   Site Description Unable to view 2/5/2019  6:30 PM   Dressing Type Gauze;Transparent 2/5/2019  6:30 PM   Dressing Status Clean;Dry;Intact 2/5/2019  6:30 PM   Drainage Bloody 2/5/2019  6:30 PM   Status Other (Comment) 2/5/2019  6:30 PM   Output (mL) 5 mL 2/6/2019  6:00 AM       Neurosurgery Physical Exam  E4V5M6  CN II-XII grossly intact  Pain limited weakness on R though full strength with effort; 5/5 throughout   SILT  Collar in place     Significant Labs:  Recent Labs   Lab 02/05/19  0911 02/06/19  0404   GLU 93 140*    135*   K 4.3 4.2    101   CO2 25 28   BUN 20 16   CREATININE 0.7 0.6   CALCIUM 9.5 9.1   MG  --  2.4     Recent Labs   Lab 02/05/19  0911 02/06/19  0404   WBC 4.29 7.83   HGB 11.7* 10.9*   HCT 34.6* 32.0*    236     Recent Labs   Lab 02/05/19  0911   INR 1.0     Microbiology Results (last 7 days)     ** No results found for the last 168 hours. **        Recent Lab Results       02/06/19  0404   02/05/19  0911        Immature Granulocytes 0.6 0.2     Immature Grans (Abs) 0.05  Comment:  Mild elevation in immature granulocytes is non specific and   can be seen in a variety of conditions including stress response,   acute inflammation, trauma and pregnancy. Correlation with other   laboratory and clinical findings is essential.   0.01  Comment:  Mild elevation in immature granulocytes is non specific and   can be seen in a variety of conditions including stress response,   acute inflammation, trauma and pregnancy. Correlation with other   laboratory and clinical findings is essential.       Anion Gap 6 9     Baso # 0.01 0.04     Basophil% 0.1 0.9     BUN, Bld 16 20     Calcium 9.1 9.5     Chloride 101 105     CO2 28 25     Creatinine 0.6 0.7     Differential Method Automated Automated     eGFR if  >60.0 >60.0     eGFR if non  >60.0  Comment:  Calculation used  to obtain the estimated glomerular filtration  rate (eGFR) is the CKD-EPI equation.    >60.0  Comment:  Calculation used to obtain the estimated glomerular filtration  rate (eGFR) is the CKD-EPI equation.        Eos # 0.0 0.1     Eosinophil% 0.0 1.9     Glucose 140 93     Gran # (ANC) 6.9 2.1     Gran% 87.7 49.5     Group & Rh   O POS     Hematocrit 32.0 34.6     Hemoglobin 10.9 11.7     INDIRECT MATEO   NEG     Coumadin Monitoring INR   1.0  Comment:  Coumadin Therapy:  2.0 - 3.0 for INR for all indicators except mechanical heart valves  and antiphospholipid syndromes which should use 2.5 - 3.5.       Lymph # 0.5 1.6     Lymph% 5.7 36.1     Magnesium 2.4       MCH 32.2 31.5     MCHC 34.1 33.8     MCV 94 93     Mono # 0.5 0.5     Mono% 5.9 11.4     MPV 10.8 10.8     nRBC 0 0     Phosphorus 3.9       Platelets 236 232     Potassium 4.2 4.3     Protime   10.0     RBC 3.39 3.72     RDW 11.9 11.9     Sodium 135 139     WBC 7.83 4.29           Significant Diagnostics:  CT: No results found in the last 24 hours.  MRI: No results found in the last 24 hours.

## 2019-02-06 NOTE — OP NOTE
DATE OF PROCEDURE: 2/5/2019     PREOPERATIVE DIAGNOSES:   Osteoarthritis of spine with radiculopathy, cervical region  Cervical spodylolisthesis  Cervical Kyphosis  Cervical stenosis with radiculopathy  POSTOPERATIVE DIAGNOSES:   Osteoarthritis of spine with radiculopathy, cervical region  Cervical spodylolisthesis  Cervical Kyphosis  Cervical stenosis with radiculopathy    PROCEDURES PERFORMED:   Procedure(s) (LRB):  DISCECTOMY, SPINE, CERVICAL, ANTERIOR APPROACH, WITH FUSION C3-4, 4-5, 5-6, 6-7 (N/A)    Surgeon(s) and Role:     * Jesus Kerr MD - Primary     * Dillon Barth MD - Resident - Assisting        1. Traction with GW tongs with neck bolster for reduction of kyphosis, spondylolisthesis  2. Anterior cervical discectomy and fusion   C3-4: Placement of biomechanical interbody cage (colonial lordotic 7 degree 6     Mm)   C4-5: Placement of biomechanical interbody cage (colonial lordotic 7 degree   6   Mm)   C5-6: Placement of biomechanical interbody cage (colonial lordotic 7 degree   6   Mm)   C6-7: Placement of biomechanical interbody cage (colonial lordotic 7 degree   6   Mm)  3. Anterior Cervical instrumentation with reduction of kyphosis   60 mm plate, 10 14mm self drilling, self tapping screws  4. Use of intraoperative neuromonitoring      ANESTHESIA: General        CLINICAL HISTORY AND INDICATION FOR SURGERY: Rabia Winn is a 53 y.o.   female was symptomatic of cervical stenosis, spondylolisthesis and kyphosis with neck pain and radiculopathy. She had progressive and functionally disabling symptoms despite conservative treatment. she failed reasonable non surgical treatments. Serious, irreversible, and more common  risks, benefits, and potential complications of surgery were reviewed with adequate verbal understanding.  Potential short and longer term recovery potential reviewed. she  decided to proceed with surgery and consented to surgery.       OPERATIVE PROCEDURE:    After informed  consent was obtained, the patient was  taken to the operating room and placed under general anesthesia. She was intubated in neutral position and placed on the operating room table in supine position. She was placed in traction. [place]ipton wells tongs were fixated into the skull at the superior temporal line with 10lbs of weight with a neck bolster were placed for positional reduction of Cervical kyphotic spinal deformity. This was evaluated with fluoroscopy with nice re-alignment and partial reduction. IOM emg, ssep, mep leads placed; MEP performed intermittently throughout the case. Her  left anterior neck was scrubbed, prepped, and draped in routine, sterile fashion.  After surgical time-out, lateral fluoroscopy was used to identify the appropriate levels and plan the approach. An incision was made in a skin crease on the left side. Dissection was carried down through skin and  subcutaneous tissues using sharp dissection and Bovie electrocautery. The platysma  was elevated and incised with the Bovie. A plane was then generated medial to the  sternocleidomastoid muscle and carried down to the anterior prevertebral fascia,  which was further exposed with Kittners. A right angle grasped the ALL, and fluorowas taken to confirm the appropriate level. The surgical levels were marked permanently, entering the disc space.  After confirmation of the level,  the anterior prevertebral soft tissues were elevated off of the underlying vertebral bodies and disk spaces.    First, a diskectomy was performed at the C6-7 level. Distraction pins were placed in the vertebral bodies and the disk  space was placed under gentle distraction.   The anterior annulus was incised with a 15-blade. The majority of the disk was then removed with a combination of curettes, rongeurs, and a high-speed drill. The microscope was brought in the field for the remainder of the diskectomy. Under the operative microscope, the high-speed drill was used to  drill down the remainder of the disk space down to the posterior longitudinal ligament. The PLL was then gently elevated with micro  nerve hook and detached from the posterior vertebral body and osteophyte using a Kerrison in circumferential fashion. This fully decompressed the thecal  sac. Kerrisons were then used to perform a proximal foraminotomy on each side. At the end of the decompression, thecal sac and exiting nerve roots on both sides were  well decompressed. At this level, the disc space was quite degenerative, there was a large posterior osteophyte, there was a right sided disc herniation, there was a Left sided disc herniation and there were large uncovertebral spurs bilaterally which resulted in foraminal stenosis   The superior and inferior endplates were then prepared for bony fusion and sized for interbody graft. An appropriately sized Globus PEEK  biomechanical Interbody Cage was used. It was filled with a mixture of vivagen allograft and local autgraft. It was placed in the disk space to the appropriate depth to generate the interbody fusion. The disk space was taken out of distraction and the pin was moved.    The C5-6 disk space was then placed under gentle distraction. A diskectomy was then performed at the this level in a manner identical to that noted above.  At this level,the disc space was quite degenerative, there was a large posterior osteophyte, there was a right sided disc herniation, there was a Left sided disc herniation and there were large uncovertebral spurs bilaterally which resulted in foraminal stenosis.  At the end of the decompression, the thecal sac and exiting nerve roots on both sides were well decompressed. The superior and inferior endplates were prepared for bony fusion and sized for an interbody graft. At this level, an appropriately sized Globus Peek  Biomechanical interbody cage was placed. It was filled with a mixture of osteoamp allograft and local autgraft and  tamped into the disc space to the appropriate depth to generate the interbody fusion. The disk space was taken out of distraction, and  the pin was moved.     The C4-5 disk space was then placed under gentle distraction with the pins placed to maximize the reduction of the kyphotic spondylolisthesis. A diskectomy was then performed at the this level in a manner identical to that noted above. The PLL was resected.  At the end of the decompression, the thecal sac and exiting nerve roots on both sides were well decompressed. The superior and inferior endplates were prepared for bony fusion and sized for an interbody graft. At this level,  an appropriately sized Globus PEEK  biomechanical interbody cage was placed. It was filled with a mixture of osteoamp allograft and local autgraft. It was then tamped to the appropriate depth to generate the interbody fusion. The deformity correction was evaluated with fluoroscopy with nice alignment and reduction.    The C3-4 disk space was then placed under gentle distraction with the pins placed to maximize the reduction of the kyphotic spondylolisthesis. A diskectomy was then performed at the this level in a manner identical to that noted above. The PLL was resected.  At the end of the decompression, the thecal sac and exiting nerve roots on both sides were well decompressed. The superior and inferior endplates were prepared for bony fusion and sized for an interbody graft. At this level,  an appropriately sized Globus PEEK  biomechanical interbody cage was placed. It was filled with a mixture of osteoamp allograft and local autgraft. It was then tamped to the appropriate depth to generate the interbody fusion.     After performing the decompression and interbody fusion at all four levels, anterior cervical instrumentation was then performed with fluoroscopy guidance spanning all 5 segments from C3-C7. An appropriately sized plate was selected and lordotically contoured. 10  Self-drilling screws were placed, following  holes with a handheld awl. The superior and inferior screws were placed first. The following technique was performed to optimize lordosis and fixation of the deformity. There was a slight gap between the C5 vertebral body due to the C4-5 kyphosis. The screws were placed at C3, C7 then subsequently at C4 and C6. This created a nice apposition of the plate, Finally the C5 screws were simultaneously engaged with good purchase. The final construct showed significant improvement in alignment.   All the screws were tightened to the final appropriate depth, and the final CAM locking mechanism was tightened. The wound was irrigated with  bacitracin saline. IOM, EMG, SSEP, and final MEPs remained at baseline. Hemostasis was obtained with the bipolar electrocautery and  FloSeal. The platysma was reapproximated with interrupted 2-0 Vicryl sutures. The subcuticular layer was closed with interrupted, 3-0 Vicryl sutures. The skin was covered with steri-strips. The patient was awakened from anesthesia and transferred to the recovery room in stable condition. There were no complications. All sponge and instrument counts were correct. I was present and scrubbed for entire procedure.           Complications: No    Estimated Blood Loss (EBL): * No values recorded between 2/5/2019 11:17 AM and 2/5/2019  3:28 PM *           Specimens:   Specimen (12h ago, onward)    None           Implants:   Implant Name Type Inv. Item Serial No.  Lot No. LRB No. Used   MATRIX BONE CELLR VIVIGEN 5CC - [id number]  MATRIX BONE CELLR VIVIGEN 5CC  LIFENET  N/A 1   COLONIAL ACDF TPS Spacer Large, Lordotic 6mm    GLOBUS [id number]  N/A 1   COLONIAL ACDF TPS SPACER, 12 X 14, 7 DEGREES , 6MM     GLOBUS [id number]  N/A 1   COLONIAL ACDF TPS SPACER, 12 x14, 7 degrees 6mm     GLOBUS [id number]  N/A 1   COLONIAL ACDF TPS Spacer Large, Lordotic, 6mm    GLOBUS [id number] N/A 1   [place] 4 level 60mm    GLOBUS   N/A 1   [place] 4 level 60mm    GLOBUS  N/A 1   4.2 variable sdscrew 14mm    GLOBUS  N/A 8   4.2 variable sd screw 14mm    GLOBUS  N/A 2   4.6 variable sd screw 16mm    GLOBUS  N/A 2              Condition: Stable    Disposition: PACU - hemodynamically stable.    Attestation: I Performed that entire procedure with the assistanc of a Resident

## 2019-02-06 NOTE — PLAN OF CARE
Pt asleep easily aroused. VSS. Patient states pain is tolerable. Received IV and PO pain mediction No N&V. Tolerated pill intake and sips of water. Dressing to neck intact with drain in place. Teds/SCD's in place throughout duration in PACU. Transferred to [facility] then  730A

## 2019-02-07 VITALS
SYSTOLIC BLOOD PRESSURE: 125 MMHG | TEMPERATURE: 99 F | BODY MASS INDEX: 20.39 KG/M2 | OXYGEN SATURATION: 94 % | WEIGHT: 108 LBS | DIASTOLIC BLOOD PRESSURE: 76 MMHG | HEART RATE: 64 BPM | HEIGHT: 61 IN | RESPIRATION RATE: 18 BRPM

## 2019-02-07 DIAGNOSIS — G89.18 POST-OP PAIN: Primary | ICD-10-CM

## 2019-02-07 LAB
ANION GAP SERPL CALC-SCNC: 8 MMOL/L
BASOPHILS # BLD AUTO: 0 K/UL
BASOPHILS NFR BLD: 0 %
BUN SERPL-MCNC: 17 MG/DL
CALCIUM SERPL-MCNC: 9.1 MG/DL
CHLORIDE SERPL-SCNC: 101 MMOL/L
CO2 SERPL-SCNC: 28 MMOL/L
CREAT SERPL-MCNC: 0.7 MG/DL
DIFFERENTIAL METHOD: ABNORMAL
EOSINOPHIL # BLD AUTO: 0 K/UL
EOSINOPHIL NFR BLD: 0 %
ERYTHROCYTE [DISTWIDTH] IN BLOOD BY AUTOMATED COUNT: 12.1 %
EST. GFR  (AFRICAN AMERICAN): >60 ML/MIN/1.73 M^2
EST. GFR  (NON AFRICAN AMERICAN): >60 ML/MIN/1.73 M^2
GLUCOSE SERPL-MCNC: 141 MG/DL
HCT VFR BLD AUTO: 33.1 %
HGB BLD-MCNC: 11 G/DL
IMM GRANULOCYTES # BLD AUTO: 0.04 K/UL
IMM GRANULOCYTES NFR BLD AUTO: 0.4 %
LYMPHOCYTES # BLD AUTO: 0.5 K/UL
LYMPHOCYTES NFR BLD: 4.9 %
MAGNESIUM SERPL-MCNC: 2.2 MG/DL
MCH RBC QN AUTO: 32.3 PG
MCHC RBC AUTO-ENTMCNC: 33.2 G/DL
MCV RBC AUTO: 97 FL
MONOCYTES # BLD AUTO: 0.6 K/UL
MONOCYTES NFR BLD: 6.4 %
NEUTROPHILS # BLD AUTO: 8.6 K/UL
NEUTROPHILS NFR BLD: 88.3 %
NRBC BLD-RTO: 0 /100 WBC
PHOSPHATE SERPL-MCNC: 3.1 MG/DL
PLATELET # BLD AUTO: 225 K/UL
PMV BLD AUTO: 10.5 FL
POTASSIUM SERPL-SCNC: 4.5 MMOL/L
RBC # BLD AUTO: 3.41 M/UL
SODIUM SERPL-SCNC: 137 MMOL/L
WBC # BLD AUTO: 9.74 K/UL

## 2019-02-07 PROCEDURE — 36415 COLL VENOUS BLD VENIPUNCTURE: CPT

## 2019-02-07 PROCEDURE — 84100 ASSAY OF PHOSPHORUS: CPT

## 2019-02-07 PROCEDURE — 20600001 HC STEP DOWN PRIVATE ROOM

## 2019-02-07 PROCEDURE — 83735 ASSAY OF MAGNESIUM: CPT

## 2019-02-07 PROCEDURE — 63600175 PHARM REV CODE 636 W HCPCS: Performed by: STUDENT IN AN ORGANIZED HEALTH CARE EDUCATION/TRAINING PROGRAM

## 2019-02-07 PROCEDURE — 80048 BASIC METABOLIC PNL TOTAL CA: CPT

## 2019-02-07 PROCEDURE — 85025 COMPLETE CBC W/AUTO DIFF WBC: CPT

## 2019-02-07 PROCEDURE — 25000003 PHARM REV CODE 250: Performed by: STUDENT IN AN ORGANIZED HEALTH CARE EDUCATION/TRAINING PROGRAM

## 2019-02-07 RX ORDER — METHYLPREDNISOLONE 4 MG/1
TABLET ORAL
Qty: 1 PACKAGE | Refills: 0 | Status: SHIPPED | OUTPATIENT
Start: 2019-02-07 | End: 2019-02-28

## 2019-02-07 RX ORDER — ONDANSETRON 4 MG/1
4 TABLET, FILM COATED ORAL EVERY 6 HOURS PRN
Qty: 20 TABLET | Refills: 0 | Status: SHIPPED | OUTPATIENT
Start: 2019-02-07 | End: 2019-02-15 | Stop reason: SDUPTHER

## 2019-02-07 RX ORDER — TIZANIDINE 4 MG/1
4 TABLET ORAL EVERY 8 HOURS PRN
Qty: 60 TABLET | Refills: 1
Start: 2019-02-07 | End: 2019-02-17

## 2019-02-07 RX ORDER — OXYCODONE AND ACETAMINOPHEN 5; 325 MG/1; MG/1
1 TABLET ORAL EVERY 4 HOURS PRN
Qty: 60 TABLET | Refills: 0 | Status: SHIPPED | OUTPATIENT
Start: 2019-02-07 | End: 2019-02-07 | Stop reason: SDUPTHER

## 2019-02-07 RX ORDER — OXYCODONE AND ACETAMINOPHEN 5; 325 MG/1; MG/1
1 TABLET ORAL EVERY 4 HOURS PRN
Qty: 60 TABLET | Refills: 0 | Status: SHIPPED | OUTPATIENT
Start: 2019-02-07 | End: 2019-02-15

## 2019-02-07 RX ORDER — DIAZEPAM 5 MG/1
5 TABLET ORAL EVERY 6 HOURS PRN
Qty: 30 TABLET | Refills: 0 | Status: SHIPPED | OUTPATIENT
Start: 2019-02-07 | End: 2019-02-07 | Stop reason: CLARIF

## 2019-02-07 RX ADMIN — OXYCODONE HYDROCHLORIDE AND ACETAMINOPHEN 1 TABLET: 5; 325 TABLET ORAL at 12:02

## 2019-02-07 RX ADMIN — AMLODIPINE BESYLATE 2.5 MG: 2.5 TABLET ORAL at 08:02

## 2019-02-07 RX ADMIN — DEXAMETHASONE SODIUM PHOSPHATE 4 MG: 4 INJECTION, SOLUTION INTRA-ARTICULAR; INTRALESIONAL; INTRAMUSCULAR; INTRAVENOUS; SOFT TISSUE at 12:02

## 2019-02-07 RX ADMIN — DEXAMETHASONE SODIUM PHOSPHATE 4 MG: 4 INJECTION, SOLUTION INTRA-ARTICULAR; INTRALESIONAL; INTRAMUSCULAR; INTRAVENOUS; SOFT TISSUE at 05:02

## 2019-02-07 RX ADMIN — STANDARDIZED SENNA CONCENTRATE AND DOCUSATE SODIUM 1 TABLET: 8.6; 5 TABLET, FILM COATED ORAL at 08:02

## 2019-02-07 RX ADMIN — OXYCODONE HYDROCHLORIDE AND ACETAMINOPHEN 1 TABLET: 5; 325 TABLET ORAL at 07:02

## 2019-02-07 RX ADMIN — HYDROMORPHONE HYDROCHLORIDE 1 MG: 1 INJECTION, SOLUTION INTRAMUSCULAR; INTRAVENOUS; SUBCUTANEOUS at 05:02

## 2019-02-07 RX ADMIN — HYDROMORPHONE HYDROCHLORIDE 1 MG: 1 INJECTION, SOLUTION INTRAMUSCULAR; INTRAVENOUS; SUBCUTANEOUS at 10:02

## 2019-02-07 RX ADMIN — OXYCODONE HYDROCHLORIDE AND ACETAMINOPHEN 1 TABLET: 5; 325 TABLET ORAL at 01:02

## 2019-02-07 RX ADMIN — HEPARIN SODIUM 5000 UNITS: 5000 INJECTION, SOLUTION INTRAVENOUS; SUBCUTANEOUS at 05:02

## 2019-02-07 RX ADMIN — CEFAZOLIN 1 G: 330 INJECTION, POWDER, FOR SOLUTION INTRAMUSCULAR; INTRAVENOUS at 05:02

## 2019-02-07 NOTE — DISCHARGE SUMMARY
"[facility]  Neurosurgery  Discharge Summary      Patient Name: Rabia Winn  MRN: [id number]  Admission Date: 2/5/2019  Hospital Length of Stay: 2 days  Discharge Date and Time:  02/07/2019 8:11 AM  Attending Physician: Jesus Kerr MD   Discharging Provider: Dillon Barth MD  Primary Care Provider: Primary Doctor No    HPI:   No notes on file  Patient presented for elective C3-7 ACDF. Surgery proceeded without complication. Patient recovered fully from anesthesia and was transferred to the floor. She was evaluated by PT/OT daily and determined to be appropriate for dc home with HH, RW. Surgical drain was removed prior to discharge. Patient discharged without complication and with post ACDF dc instructions.         Procedure(s) (LRB):  DISCECTOMY, SPINE, CERVICAL, ANTERIOR APPROACH, WITH FUSION C3-4, 4-5, 5-6, 6-7 (N/A)     Hospital Course: 2/6: post op xrays look appropriate       Consults:     Significant Diagnostic Studies: none    Pending Diagnostic Studies:     None        Final Active Diagnoses:    Diagnosis Date Noted POA    PRINCIPAL PROBLEM:  Spondylolisthesis, cervical region [M43.12] 02/05/2019 Yes    Osteoarthritis of spine with radiculopathy, cervical region [M47.22] 02/05/2019 Yes      Problems Resolved During this Admission:      Discharged Condition: good    Disposition: Home or Self Care    Follow Up:  Follow-up Information     Jesus Kerr MD. Go in 2 weeks.    Specialty:  Neurosurgery  Why:  For wound re-check  Contact information:  [address] [place] [address]  [phone]                 Patient Instructions:      Back/Cervical Brace For Home Use     Order Specific Question Answer Comments   Height: 5' 1" (1.549 m)    Weight: 49 kg (108 lb 0.4 oz)    Length of need (1-99 months): 99    Product(s) ordered: CERVICAL COLLAR: ASPEN    Does patient have medical equipment at home? none      WALKER FOR HOME USE     Order Specific Question Answer Comments " "  Type of Walker: Adult (5'4"-6'6")    With wheels? Yes    Height: 5' 1" (1.549 m)    Weight: 49 kg (108 lb 0.4 oz)    Length of need (1-99 months): 99    Does patient have medical equipment at home? none    Please check all that apply: Patient's condition impairs ambulation.      Ambulatory referral to Home Health   Referral Priority: Routine Referral Type: Home Health   Referral Reason: Specialty Services Required   Requested Specialty: Home Health Services   Number of Visits Requested: 1     Other restrictions (specify):     Notify your health care provider if you experience any of the following:  temperature >100.4     Notify your health care provider if you experience any of the following:  persistent nausea and vomiting or diarrhea     Notify your health care provider if you experience any of the following:  severe uncontrolled pain     Notify your health care provider if you experience any of the following:  redness, tenderness, or signs of infection (pain, swelling, redness, odor or green/yellow discharge around incision site)     Notify your health care provider if you experience any of the following:  difficulty breathing or increased cough     Notify your health care provider if you experience any of the following:  severe persistent headache     Notify your health care provider if you experience any of the following:  worsening rash     Notify your health care provider if you experience any of the following:  persistent dizziness, light-headedness, or visual disturbances     Notify your health care provider if you experience any of the following:  increased confusion or weakness     Remove dressing in 24 hours   Order Comments: Remove outer dressing tomorrow; leave steri strips on until they fall off or until removed in clinic appt     Medications:  Reconciled Home Medications:      Medication List      START taking these medications    diazePAM 5 MG tablet  Commonly known as:  VALIUM  Take 1 tablet (5 " mg total) by mouth every 6 (six) hours as needed (muscle spasms).     methylPREDNISolone 4 mg tablet  Commonly known as:  MEDROL DOSEPACK  Take only if worsening swallowing difficulty     ondansetron 4 MG tablet  Commonly known as:  ZOFRAN  Take 1 tablet (4 mg total) by mouth every 6 (six) hours as needed for Nausea.     oxyCODONE-acetaminophen 5-325 mg per tablet  Commonly known as:  PERCOCET  Take 1 tablet by mouth every 4 (four) hours as needed (pain 1-10).        CONTINUE taking these medications    amLODIPine 5 MG tablet  Commonly known as:  NORVASC  Take 0.5 tablets (2.5 mg total) by mouth once daily.     celecoxib 200 MG capsule  Commonly known as:  CeleBREX  Take 1 capsule (200 mg total) by mouth daily as needed for Pain.     gabapentin 100 MG capsule  Commonly known as:  NEURONTIN  Take 100 mg by mouth once.     zolpidem 5 MG Tab  Commonly known as:  AMBIEN  Take 1 tablet (5 mg total) by mouth nightly as needed.        ASK your doctor about these medications    chlorhexidine 0.12 % solution  Commonly known as:  PERIDEX  Use as directed 10 mLs in the mouth or throat 2 (two) times daily.  Ask about: Should I take this medication?     mupirocin 2 % ointment  Commonly known as:  BACTROBAN  Apply topically 2 (two) times daily.  Ask about: Should I take this medication?            Dillon Barth MD  Neurosurgery  [facility]

## 2019-02-07 NOTE — PLAN OF CARE
CM met with patient and spouse to obtain discharge planning assessment.  Planned discharge is home with family and home health - Plan (A) or home with family - Plan (B).    PCP:  Primary Doctor No     Payor:  Payor: GENERIC COMMERCIAL / Plan: GENERIC COMMERCIAL / Product Type: Commercial /      Pharmacy:    [id number] [place] - [address]  [address]  [place] [address]  Phone: [phone] Fax: [phone]       02/07/19 1441   Discharge Assessment   Assessment Type Discharge Planning Assessment   Confirmed/corrected address and phone number on facesheet? Yes   Assessment information obtained from? Patient   Expected Length of Stay (days) 2   Communicated expected length of stay with patient/caregiver yes   Prior to hospitilization cognitive status: Alert/Oriented   Prior to hospitalization functional status: Independent   Current cognitive status: Alert/Oriented   Current Functional Status: Independent   Lives With spouse   Able to Return to Prior Arrangements yes   Is patient able to care for self after discharge? Yes   Who are your caregiver(s) and their phone number(s)? Tyrell - spouse [phone]   Patient's perception of discharge disposition home health   Readmission Within the Last 30 Days no previous admission in last 30 days   Patient currently being followed by outpatient case management? No   Patient currently receives any other outside agency services? No   Is it the patient/care giver preference to resume care with the current outside agency? No   Equipment Currently Used at Home none   Do you have any problems affording any of your prescribed medications? No   Is the patient taking medications as prescribed? yes   Does the patient have transportation home? Yes   Transportation Anticipated family or friend will provide   Does the patient receive services at the [facility]? No   Discharge Plan A Home with family;Home Health   Discharge Plan B Home with family   DME Needed  Upon Discharge  walker, rolling   Patient/Family in Agreement with Plan yes

## 2019-02-07 NOTE — PLAN OF CARE
Patient to be discharged home with home health.  SW to set up home health. Home Health provided by [facility]. Patient was also provided with a rolling walker.  Family to provide transportation home.  Neurosurgery clinic to schedule follow up appointment.    Future Appointments   Date Time Provider Department Center   2/15/2019 11:00 AM NURSE, [facility] NEUROSURGERY [facility]   2/26/2019 12:30 PM [id number] [place]   2/26/2019  1:30 PM Jesus Kerr MD [facility]   3/6/2019  1:40 PM ANN MARIE Fischer MD [facility]          02/07/19 1533   Final Note   Assessment Type Final Discharge Note   Anticipated Discharge Disposition Home-Health   Hospital Follow Up  Appt(s) scheduled? (Neurosurgery clinic to schedule follow up appointment.)   Discharge plans and expectations educations in teach back method with documentation complete? Yes

## 2019-02-07 NOTE — TELEPHONE ENCOUNTER
Called . New prescription sent to pharmacy.     Script printed in error. Voided and placed in shred box.   Called pharmacy to confirm able to fill without issue  Changed muscle relaxer to tizanidine 4mg Q8H prn   Insurance will only allow 7 day fill on Percocet     Called  to relay information.  verbalized understanding.     Bing Quijano PA-C  Neurosurgery - [facility]

## 2019-02-07 NOTE — DISCHARGE INSTRUCTIONS
Please follow ONLY the instructions that are checked below.    Activity Restrictions:  [x]  Return to work will be determined on an individual basis.  [x]  No lifting greater than 10 pounds.  [x]  Avoid bending and twisting the area of your surgery more than 45 degrees from neutral position in any direction.  [x]  No driving or operating machinery:  []  until cleared by your surgeon.  [x]  while taking narcotic pain medications or muscle relaxants.  []  No cervical collar, soft collar, or lumbar brace required.  [x]  Wear your brace at all times. You may be given an extra brace or soft collar to wear when showering.  [x]  Wear your brace at all times except when flat in bed.  []  Wear brace for comfort only.  [x]  Increase ambulation over the next 2 weeks so that you are walking 2 miles per day at 2 weeks post-operatively.  [x]  Walk on paved surfaces only. It is okay to walk up and down stairs while holding onto a side rail.  []  No sexual activity for 2-3 weeks.    Discharge Medication/Follow-up:  [x]  Please refer to discharge medication reconciliation form.  [x]  Do not take ANY non-steroidal anti-inflammatory drugs (NSAIDS), including the following: ibuprofen, naprosyn, Aleve, Advil, Indocin, Mobic, or Celebrex for:  []  4 weeks  []  8 weeks  [x]  6 months  [x]  Prescriptions for appropriate medication will be given upon discharge.   []  Pain control:             []  Muscle relaxer:            [x]  Take docusate (Colace 100 mg): take one capsule a day as needed for constipation. You can get this over the counter.  [x]  Follow-up appointment:  [x]  10-14 days post-op for wound check by physician assistant/nurse  [x]  4-6 weeks with MD:  [x]  with x-rays  []  without x-rays  []  An appointment will be mailed to you.    Wound Care:  []  Remove dressing or bandaid in    days.  []  No bandage required. Keep your incision open to the air.  [x]  You may shower on the 2nd day after your surgery. Have the force of water  hit you opposite from the incision. Pat the incision dry after your shower; do not scrub the incision.  [x]  You cannot take a bath until 8 weeks after surgery.  []  Apply bacitracin to incision twice a day for    more days.    Call your doctor or go to the Emergency Room for any signs of infection, including: increased redness, drainage, pain, or fever (temperature ?101.5 for 24 hours). Call your doctor or go to the Emergency Room if there are any localized neurological changes; problems with speech, vision, numbness, tingling, weakness, or severe headache; or for other concerns.    Special Instructions:  [x]  No use of tobacco products.  [x]  Diet: Please eat a regular diet as tolerated.  []  Other diet:              Specific physician instructions:           Physicians need 3 days' notice for pain medicine to be refilled. Pain medicine will only be refilled between 8 AM and 5 PM, Monday through Friday, due to Food and Drug Administration regulation of documentation.    If you have any questions about this form, please call [phone].    Form No. 40742 (Revised 10/31/2013)

## 2019-02-07 NOTE — PLAN OF CARE
SHANTA following for DC needs. SW in communication with CM.    [facility]'s [facility] for HH is [facility]. [facility] sent referral to [facility] via [employer].      02/07/19 1427   Post-Acute Status   Post-Acute Authorization Home Health/Hospice   Home Health/Hospice Status Referrals Sent     UPDATE 2/8/2019 9:37 AM  [facility] cannot accept the patient's insurance. CM sent referral to [facility] . [facility] accepted the patient.     Katerine Humphries, BEN  [facility]  [phone]

## 2019-02-07 NOTE — TELEPHONE ENCOUNTER
Pt tried getting pain medication filled, but the resident who ordered it, his MAURA number didn't work.    Thank you

## 2019-02-08 DIAGNOSIS — M47.812 SPONDYLOSIS OF CERVICAL REGION WITHOUT MYELOPATHY OR RADICULOPATHY: ICD-10-CM

## 2019-02-08 RX ORDER — CELECOXIB 200 MG/1
200 CAPSULE ORAL DAILY PRN
Qty: 30 CAPSULE | Refills: 1 | OUTPATIENT
Start: 2019-02-08

## 2019-02-14 DIAGNOSIS — Z98.1 S/P CERVICAL SPINAL FUSION: Primary | ICD-10-CM

## 2019-02-15 RX ORDER — OXYCODONE AND ACETAMINOPHEN 5; 325 MG/1; MG/1
1 TABLET ORAL EVERY 6 HOURS PRN
Qty: 28 TABLET | Refills: 0 | Status: SHIPPED | OUTPATIENT
Start: 2019-02-15 | End: 2019-02-22

## 2019-02-19 DIAGNOSIS — M47.812 SPONDYLOSIS OF CERVICAL REGION WITHOUT MYELOPATHY OR RADICULOPATHY: ICD-10-CM

## 2019-02-19 RX ORDER — CELECOXIB 200 MG/1
200 CAPSULE ORAL DAILY PRN
Qty: 30 CAPSULE | Refills: 1 | Status: CANCELLED | OUTPATIENT
Start: 2019-02-19

## 2019-02-26 ENCOUNTER — TELEPHONE (OUTPATIENT)
Dept: NEUROSURGERY | Facility: CLINIC | Age: 54
End: 2019-02-26

## 2019-02-26 NOTE — TELEPHONE ENCOUNTER
Patient requesting her refill of pain medication. Insurance will only fill 7 days worth. Please advise. [facility] [place] [address] thanks

## 2019-02-27 ENCOUNTER — TELEPHONE (OUTPATIENT)
Dept: NEUROSURGERY | Facility: CLINIC | Age: 54
End: 2019-02-27

## 2019-02-27 RX ORDER — OXYCODONE AND ACETAMINOPHEN 5; 325 MG/1; MG/1
1 TABLET ORAL EVERY 6 HOURS PRN
Qty: 28 TABLET | Refills: 0 | Status: SHIPPED | OUTPATIENT
Start: 2019-02-27 | End: 2019-03-22

## 2019-03-08 ENCOUNTER — PATIENT OUTREACH (OUTPATIENT)
Dept: ADMINISTRATIVE | Facility: HOSPITAL | Age: 54
End: 2019-03-08

## 2019-03-19 ENCOUNTER — HOSPITAL ENCOUNTER (OUTPATIENT)
Dept: RADIOLOGY | Facility: HOSPITAL | Age: 54
Discharge: HOME | End: 2019-03-19
Attending: STUDENT IN AN ORGANIZED HEALTH CARE EDUCATION/TRAINING PROGRAM
Payer: COMMERCIAL

## 2019-03-19 ENCOUNTER — OFFICE VISIT (OUTPATIENT)
Dept: NEUROSURGERY | Facility: CLINIC | Age: 54
End: 2019-03-19
Payer: COMMERCIAL

## 2019-03-19 VITALS
BODY MASS INDEX: 19.56 KG/M2 | HEART RATE: 77 BPM | DIASTOLIC BLOOD PRESSURE: 94 MMHG | WEIGHT: 103.5 LBS | TEMPERATURE: 98 F | SYSTOLIC BLOOD PRESSURE: 139 MMHG

## 2019-03-19 DIAGNOSIS — Z98.1 S/P CERVICAL SPINAL FUSION: Primary | ICD-10-CM

## 2019-03-19 DIAGNOSIS — M40.202 KYPHOSIS OF CERVICAL REGION, UNSPECIFIED KYPHOSIS TYPE: ICD-10-CM

## 2019-03-19 DIAGNOSIS — Z98.1 S/P CERVICAL SPINAL FUSION: ICD-10-CM

## 2019-03-19 PROCEDURE — 99999 PR PBB SHADOW E&M-EST. PATIENT-LVL III: ICD-10-PCS | Mod: PBBFAC,,, | Performed by: STUDENT IN AN ORGANIZED HEALTH CARE EDUCATION/TRAINING PROGRAM

## 2019-03-19 PROCEDURE — 72040 X-RAY EXAM NECK SPINE 2-3 VW: CPT | Mod: TC,FY,PO

## 2019-03-19 PROCEDURE — 72040 XR CERVICAL SPINE AP LATERAL: ICD-10-PCS | Mod: 26,,, | Performed by: RADIOLOGY

## 2019-03-19 PROCEDURE — 99024 PR POST-OP FOLLOW-UP VISIT: ICD-10-PCS | Mod: S$GLB,,, | Performed by: STUDENT IN AN ORGANIZED HEALTH CARE EDUCATION/TRAINING PROGRAM

## 2019-03-19 PROCEDURE — 99024 POSTOP FOLLOW-UP VISIT: CPT | Mod: S$GLB,,, | Performed by: STUDENT IN AN ORGANIZED HEALTH CARE EDUCATION/TRAINING PROGRAM

## 2019-03-19 PROCEDURE — 99999 PR PBB SHADOW E&M-EST. PATIENT-LVL III: CPT | Mod: PBBFAC,,, | Performed by: STUDENT IN AN ORGANIZED HEALTH CARE EDUCATION/TRAINING PROGRAM

## 2019-03-19 PROCEDURE — 72040 X-RAY EXAM NECK SPINE 2-3 VW: CPT | Mod: 26,,, | Performed by: RADIOLOGY

## 2019-03-20 DIAGNOSIS — Z98.1 S/P CERVICAL SPINAL FUSION: Primary | ICD-10-CM

## 2019-03-20 NOTE — PROGRESS NOTES
REFERRING PHYSICIAN:    No ref. provider found  N/A    Postoperative visit #2.    CLINICAL PROGRESS:   Rabia Winn is now  Six weeks status post C3-C7 and anterior cervical diskectomy and fusion for cervical deformity  Overall she is doing well  She has significant improvement in her preoperative neck pain which is down to 2 to 3/10 with preop max of 10/10  She notes some muscle spasms and difficulty with right overhead ADLs such as combing hair with pain around the right trap and periscapular region  She is managing well at this  She is now out of her collar  She remains with residual dysphagia, maintaining calories but avoiding to offer foods and certain solids, no aspiration risk with current diet    X-rays were obtained which show intact hardware stable alignment no concerning features      MEDICATIONS:                   List reviewed, see chart for dosing details.    ALLERGIES:       Review of patient's allergies indicates:  No Known Allergies    PHYSICAL EXAMINATION:          VITAL SIGNS:   BP (!) 139/94   Pulse 77   Temp 97.5 °F (36.4 °C) (Oral)   Wt 47 kg (103 lb 8.1 oz)   BMI 19.56 kg/m²     GENERAL:  Patient is well developed, well nourished, calm, collected, and in no apparent distress.  Incision is well healed, there is a single small protruding Vicryl which was clean elevated and resected with scissors, remain clean dry intact without drainage    NEUROLOGICAL:  Strength in the left upper extremity is interossei 5/5,  5/5, Bicep 5/5, Tricep 5/5, Deltoid 5/5.  Strength in the right upper extremity is interossei 5/5,  5/5, Bicep 5/5, Tricep 5/5, Deltoid 5/5.  Sensation is intact to light touch.  Gait is intact    She has some tenderness in the right periscapular region right trapped.  She has some difficulty with overhead coordination with the right arm          No flowsheet data found.      ASSESSMENT/PLAN:  Six weeks status post 4 level ACDF for cervical reconstruction decompression  instrumented fusion  She has achieved improved alignment with stable x-rays  She has had great improvement in her ADLs and neck pain, she is happy with her progress thus far  She myofascial stiffness especially in the right shoulder region and periscapular she will need she physical therapy for range of motion strengthening potential tens unit and dry needling  She will continue to observe the progress of her dysphagia over the next 1 month and without improvement will repeat be referred to GI  Will call with progress  She will be followed up in 6 weeks clinically, and 4-6 months postop with additional x-rays    Advised to call the office Iif any questions or concerns arise.    This note was partially dictated using voice recognition software, so please excuse any errors that were not corrected.

## 2019-03-22 ENCOUNTER — OFFICE VISIT (OUTPATIENT)
Dept: FAMILY MEDICINE | Facility: CLINIC | Age: 54
End: 2019-03-22
Payer: COMMERCIAL

## 2019-03-22 VITALS
TEMPERATURE: 98 F | SYSTOLIC BLOOD PRESSURE: 122 MMHG | HEART RATE: 96 BPM | WEIGHT: 101.63 LBS | DIASTOLIC BLOOD PRESSURE: 84 MMHG | HEIGHT: 61 IN | OXYGEN SATURATION: 99 % | BODY MASS INDEX: 19.19 KG/M2

## 2019-03-22 DIAGNOSIS — R13.10 DYSPHAGIA, UNSPECIFIED TYPE: ICD-10-CM

## 2019-03-22 DIAGNOSIS — F51.01 PRIMARY INSOMNIA: ICD-10-CM

## 2019-03-22 DIAGNOSIS — R00.0 TACHYCARDIA: ICD-10-CM

## 2019-03-22 DIAGNOSIS — I10 ESSENTIAL HYPERTENSION: Primary | ICD-10-CM

## 2019-03-22 DIAGNOSIS — D64.9 ANEMIA, UNSPECIFIED TYPE: ICD-10-CM

## 2019-03-22 PROCEDURE — 99999 PR PBB SHADOW E&M-EST. PATIENT-LVL III: CPT | Mod: PBBFAC,,, | Performed by: FAMILY MEDICINE

## 2019-03-22 PROCEDURE — 99214 OFFICE O/P EST MOD 30 MIN: CPT | Mod: S$GLB,,, | Performed by: FAMILY MEDICINE

## 2019-03-22 PROCEDURE — 99214 PR OFFICE/OUTPT VISIT, EST, LEVL IV, 30-39 MIN: ICD-10-PCS | Mod: S$GLB,,, | Performed by: FAMILY MEDICINE

## 2019-03-22 PROCEDURE — 99999 PR PBB SHADOW E&M-EST. PATIENT-LVL III: ICD-10-PCS | Mod: PBBFAC,,, | Performed by: FAMILY MEDICINE

## 2019-03-22 RX ORDER — ZOLPIDEM TARTRATE 5 MG/1
5 TABLET ORAL NIGHTLY PRN
Qty: 30 TABLET | Refills: 5 | Status: SHIPPED | OUTPATIENT
Start: 2019-03-22 | End: 2019-04-15

## 2019-03-22 RX ORDER — TIZANIDINE 4 MG/1
TABLET ORAL
Refills: 1 | COMMUNITY
Start: 2019-02-23 | End: 2019-09-03

## 2019-03-22 NOTE — PROGRESS NOTES
"Subjective:       Patient ID: Rabia Winn is a 53 y.o. female.    Chief Complaint: Establish Care; Shoulder Pain (and neck; recent neck surgery 6.5wks ago); and Medication Refill (Ambien)    This pt is new to me.  The pt is followed for HTN and recently increased Norvasc to 5 mg daily.  She uses a wrist monitor to check it at home.  Over the past couple of months she has felt her heart racing at times.  She recently had surgery of her neck--she is in PT.  She is having swallowing problems since her neck surgery--she has lost several pounds.  She does not sleep well--she does well on 5 mg Ambien prn.  She has no side effects.  She has to schedule her colonoscopy--she has a hx of diveriticulitis.  She reports a history of anemia.      Review of Systems   Constitutional: Negative for activity change, appetite change, fatigue and unexpected weight change.   Eyes: Negative for visual disturbance.   Respiratory: Negative for cough, chest tightness and shortness of breath.    Cardiovascular: Negative for chest pain, palpitations and leg swelling.   Gastrointestinal: Negative for abdominal pain, constipation, diarrhea, nausea and vomiting.   Endocrine: Negative for cold intolerance, heat intolerance and polyuria.   Genitourinary: Negative for decreased urine volume and dysuria.   Musculoskeletal: Positive for neck pain. Negative for arthralgias and back pain.   Skin: Negative for rash.   Neurological: Negative for numbness and headaches.   Psychiatric/Behavioral: Positive for sleep disturbance.       Objective:       Vitals:    03/22/19 1621   BP: 122/84   Pulse: 96   Temp: 98.3 °F (36.8 °C)   TempSrc: Oral   SpO2: 99%   Weight: 46.1 kg (101 lb 10.1 oz)   Height: 5' 1" (1.549 m)     Physical Exam   Constitutional: She is oriented to person, place, and time. She appears well-developed and well-nourished.   HENT:   Head: Normocephalic.   Eyes: Conjunctivae and EOM are normal. Pupils are equal, round, and reactive to " light.   Neck: Normal range of motion. Neck supple. No thyromegaly present.   Cardiovascular: Normal rate, regular rhythm and normal heart sounds.   Mildly tachycardic   Pulmonary/Chest: Effort normal and breath sounds normal.   Abdominal: Soft. Bowel sounds are normal. There is no tenderness.   Musculoskeletal: Normal range of motion. She exhibits no tenderness or deformity.   Lymphadenopathy:     She has no cervical adenopathy.   Neurological: She is alert and oriented to person, place, and time. She displays normal reflexes. No cranial nerve deficit. She exhibits normal muscle tone. Coordination normal.   Skin: Skin is warm and dry.   Psychiatric: She has a normal mood and affect. Her behavior is normal.       Assessment:       1. Essential hypertension    2. Dysphagia, unspecified type    3. Tachycardia    4. Anemia, unspecified type    5. Primary insomnia        Plan:       Rabia was seen today for establish care, shoulder pain and medication refill.    Diagnoses and all orders for this visit:    Essential hypertension    Dysphagia, unspecified type    Tachycardia  -     TSH; Future  -     T4, free; Future    Anemia, unspecified type  -     CBC auto differential; Future  -     Ferritin; Future  -     Iron and TIBC; Future  -     Vitamin B12; Future    Primary insomnia  -     zolpidem (AMBIEN) 5 MG Tab; Take 1 tablet (5 mg total) by mouth nightly as needed.      During this visit, I reviewed the pt's history, medications, allergies, and problem list.    Nurse BP visit in 2 weeks

## 2019-04-05 ENCOUNTER — LAB VISIT (OUTPATIENT)
Dept: LAB | Facility: HOSPITAL | Age: 54
End: 2019-04-05
Payer: COMMERCIAL

## 2019-04-05 ENCOUNTER — CLINICAL SUPPORT (OUTPATIENT)
Dept: FAMILY MEDICINE | Facility: CLINIC | Age: 54
End: 2019-04-05
Payer: COMMERCIAL

## 2019-04-05 DIAGNOSIS — I10 ESSENTIAL HYPERTENSION: Primary | ICD-10-CM

## 2019-04-05 DIAGNOSIS — D64.9 ANEMIA, UNSPECIFIED TYPE: ICD-10-CM

## 2019-04-05 DIAGNOSIS — R00.0 TACHYCARDIA: ICD-10-CM

## 2019-04-05 DIAGNOSIS — Z00.00 PREVENTATIVE HEALTH CARE: ICD-10-CM

## 2019-04-05 LAB
ALBUMIN SERPL BCP-MCNC: 4.2 G/DL (ref 3.5–5.2)
ALP SERPL-CCNC: 79 U/L (ref 55–135)
ALT SERPL W/O P-5'-P-CCNC: 9 U/L (ref 10–44)
ANION GAP SERPL CALC-SCNC: 10 MMOL/L (ref 8–16)
AST SERPL-CCNC: 19 U/L (ref 10–40)
BASOPHILS # BLD AUTO: 0.06 K/UL (ref 0–0.2)
BASOPHILS NFR BLD: 1.4 % (ref 0–1.9)
BILIRUB SERPL-MCNC: 0.6 MG/DL (ref 0.1–1)
BUN SERPL-MCNC: 13 MG/DL (ref 6–20)
CALCIUM SERPL-MCNC: 9.7 MG/DL (ref 8.7–10.5)
CHLORIDE SERPL-SCNC: 104 MMOL/L (ref 95–110)
CHOLEST SERPL-MCNC: 213 MG/DL (ref 120–199)
CHOLEST/HDLC SERPL: 2.7 {RATIO} (ref 2–5)
CO2 SERPL-SCNC: 28 MMOL/L (ref 23–29)
CREAT SERPL-MCNC: 0.7 MG/DL (ref 0.5–1.4)
DIFFERENTIAL METHOD: ABNORMAL
EOSINOPHIL # BLD AUTO: 0.1 K/UL (ref 0–0.5)
EOSINOPHIL NFR BLD: 2.6 % (ref 0–8)
ERYTHROCYTE [DISTWIDTH] IN BLOOD BY AUTOMATED COUNT: 12.3 % (ref 11.5–14.5)
EST. GFR  (AFRICAN AMERICAN): >60 ML/MIN/1.73 M^2
EST. GFR  (NON AFRICAN AMERICAN): >60 ML/MIN/1.73 M^2
FERRITIN SERPL-MCNC: 86 NG/ML (ref 20–300)
GLUCOSE SERPL-MCNC: 87 MG/DL (ref 70–110)
HCT VFR BLD AUTO: 36.2 % (ref 37–48.5)
HDLC SERPL-MCNC: 79 MG/DL (ref 40–75)
HDLC SERPL: 37.1 % (ref 20–50)
HGB BLD-MCNC: 12 G/DL (ref 12–16)
IMM GRANULOCYTES # BLD AUTO: 0.01 K/UL (ref 0–0.04)
IMM GRANULOCYTES NFR BLD AUTO: 0.2 % (ref 0–0.5)
IRON SERPL-MCNC: 68 UG/DL (ref 30–160)
LDLC SERPL CALC-MCNC: 117.6 MG/DL (ref 63–159)
LYMPHOCYTES # BLD AUTO: 1.6 K/UL (ref 1–4.8)
LYMPHOCYTES NFR BLD: 37.8 % (ref 18–48)
MCH RBC QN AUTO: 31.7 PG (ref 27–31)
MCHC RBC AUTO-ENTMCNC: 33.1 G/DL (ref 32–36)
MCV RBC AUTO: 96 FL (ref 82–98)
MONOCYTES # BLD AUTO: 0.6 K/UL (ref 0.3–1)
MONOCYTES NFR BLD: 13.2 % (ref 4–15)
NEUTROPHILS # BLD AUTO: 1.9 K/UL (ref 1.8–7.7)
NEUTROPHILS NFR BLD: 44.8 % (ref 38–73)
NONHDLC SERPL-MCNC: 134 MG/DL
NRBC BLD-RTO: 0 /100 WBC
PLATELET # BLD AUTO: 275 K/UL (ref 150–350)
PMV BLD AUTO: 10.5 FL (ref 9.2–12.9)
POTASSIUM SERPL-SCNC: 4.1 MMOL/L (ref 3.5–5.1)
PROT SERPL-MCNC: 7.1 G/DL (ref 6–8.4)
RBC # BLD AUTO: 3.78 M/UL (ref 4–5.4)
SATURATED IRON: 19 % (ref 20–50)
SODIUM SERPL-SCNC: 142 MMOL/L (ref 136–145)
T4 FREE SERPL-MCNC: 0.73 NG/DL (ref 0.71–1.51)
TOTAL IRON BINDING CAPACITY: 349 UG/DL (ref 250–450)
TRANSFERRIN SERPL-MCNC: 236 MG/DL (ref 200–375)
TRIGL SERPL-MCNC: 82 MG/DL (ref 30–150)
TSH SERPL DL<=0.005 MIU/L-ACNC: 1.75 UIU/ML (ref 0.4–4)
VIT B12 SERPL-MCNC: 404 PG/ML (ref 210–950)
WBC # BLD AUTO: 4.18 K/UL (ref 3.9–12.7)

## 2019-04-05 PROCEDURE — 84443 ASSAY THYROID STIM HORMONE: CPT

## 2019-04-05 PROCEDURE — 85025 COMPLETE CBC W/AUTO DIFF WBC: CPT

## 2019-04-05 PROCEDURE — 84439 ASSAY OF FREE THYROXINE: CPT

## 2019-04-05 PROCEDURE — 99499 NO LOS: ICD-10-PCS | Mod: S$GLB,,, | Performed by: FAMILY MEDICINE

## 2019-04-05 PROCEDURE — 82607 VITAMIN B-12: CPT

## 2019-04-05 PROCEDURE — 80061 LIPID PANEL: CPT

## 2019-04-05 PROCEDURE — 80053 COMPREHEN METABOLIC PANEL: CPT

## 2019-04-05 PROCEDURE — 99499 UNLISTED E&M SERVICE: CPT | Mod: S$GLB,,, | Performed by: FAMILY MEDICINE

## 2019-04-05 PROCEDURE — 36415 COLL VENOUS BLD VENIPUNCTURE: CPT | Mod: PO

## 2019-04-05 PROCEDURE — 83540 ASSAY OF IRON: CPT

## 2019-04-05 PROCEDURE — 82728 ASSAY OF FERRITIN: CPT

## 2019-04-05 NOTE — Clinical Note
Patient in clinic for bp reading B/p 130/90 P 76 2nd reading 128/92 P 70 pt states she has had fluctuating  reading at home will try to get copy of log to office.

## 2019-04-05 NOTE — PROGRESS NOTES
Rabia Winn 53 y.o. female is here today for Blood Pressure check.   History of HTN yes.    Review of patient's allergies indicates:  No Known Allergies  Creatinine   Date Value Ref Range Status   02/07/2019 0.7 0.5 - 1.4 mg/dL Final     Sodium   Date Value Ref Range Status   02/07/2019 137 136 - 145 mmol/L Final     Potassium   Date Value Ref Range Status   02/07/2019 4.5 3.5 - 5.1 mmol/L Final   ]  Patient verifies taking blood pressure medications on a regular basis at the same time of the day.     Current Outpatient Medications:     amLODIPine (NORVASC) 5 MG tablet, Take 0.5 tablets (2.5 mg total) by mouth once daily., Disp: 30 tablet, Rfl: 3    celecoxib (CELEBREX) 200 MG capsule, Take 1 capsule (200 mg total) by mouth daily as needed for Pain., Disp: 30 capsule, Rfl: 1    gabapentin (NEURONTIN) 100 MG capsule, Take 100 mg by mouth once., Disp: , Rfl:     tiZANidine (ZANAFLEX) 4 MG tablet, TAKE 1 TABLET BY MOUTH EVERY 8 HOURS AS NEEDED MUSCLE SPASM, Disp: , Rfl: 1    zolpidem (AMBIEN) 5 MG Tab, Take 1 tablet (5 mg total) by mouth nightly as needed., Disp: 30 tablet, Rfl: 5  Does patient have record of home blood pressure readings pt will send to office. Readings have been fluctuating   Last dose of blood pressure medication was taken at 5:30p.  Patient is asymptomatic.   Complains of n/a.      ,   .    Blood pressure reading after 15 minutes was 128/92, Pulse 70.   notified.

## 2019-04-12 DIAGNOSIS — M25.551 RIGHT HIP PAIN: Primary | ICD-10-CM

## 2019-04-15 ENCOUNTER — TELEPHONE (OUTPATIENT)
Dept: FAMILY MEDICINE | Facility: CLINIC | Age: 54
End: 2019-04-15

## 2019-04-15 ENCOUNTER — HOSPITAL ENCOUNTER (OUTPATIENT)
Dept: RADIOLOGY | Facility: HOSPITAL | Age: 54
Discharge: HOME | End: 2019-04-15
Attending: ORTHOPAEDIC SURGERY
Payer: COMMERCIAL

## 2019-04-15 ENCOUNTER — OFFICE VISIT (OUTPATIENT)
Dept: ORTHOPEDICS | Facility: CLINIC | Age: 54
End: 2019-04-15
Payer: COMMERCIAL

## 2019-04-15 VITALS — BODY MASS INDEX: 18.69 KG/M2 | WEIGHT: 99 LBS | HEIGHT: 61 IN

## 2019-04-15 DIAGNOSIS — M25.551 RIGHT HIP PAIN: Primary | ICD-10-CM

## 2019-04-15 DIAGNOSIS — M54.16 LUMBAR RADICULOPATHY: ICD-10-CM

## 2019-04-15 DIAGNOSIS — Z87.828 HISTORY OF MOTOR VEHICLE ACCIDENT: ICD-10-CM

## 2019-04-15 DIAGNOSIS — M54.41 ACUTE RIGHT-SIDED LOW BACK PAIN WITH RIGHT-SIDED SCIATICA: ICD-10-CM

## 2019-04-15 DIAGNOSIS — M47.16 OSTEOARTHRITIS OF LUMBAR SPINE WITH MYELOPATHY: ICD-10-CM

## 2019-04-15 DIAGNOSIS — M25.551 RIGHT HIP PAIN: ICD-10-CM

## 2019-04-15 PROCEDURE — 99203 OFFICE O/P NEW LOW 30 MIN: CPT | Mod: S$GLB,,, | Performed by: ORTHOPAEDIC SURGERY

## 2019-04-15 PROCEDURE — 99203 PR OFFICE/OUTPT VISIT, NEW, LEVL III, 30-44 MIN: ICD-10-PCS | Mod: S$GLB,,, | Performed by: ORTHOPAEDIC SURGERY

## 2019-04-15 PROCEDURE — 99999 PR PBB SHADOW E&M-EST. PATIENT-LVL III: ICD-10-PCS | Mod: PBBFAC,,, | Performed by: ORTHOPAEDIC SURGERY

## 2019-04-15 PROCEDURE — 99999 PR PBB SHADOW E&M-EST. PATIENT-LVL III: CPT | Mod: PBBFAC,,, | Performed by: ORTHOPAEDIC SURGERY

## 2019-04-15 PROCEDURE — 73521 X-RAY EXAM HIPS BI 2 VIEWS: CPT | Mod: 26,,, | Performed by: RADIOLOGY

## 2019-04-15 PROCEDURE — 73521 X-RAY EXAM HIPS BI 2 VIEWS: CPT | Mod: TC,PO

## 2019-04-15 PROCEDURE — 73521 XR HIPS BILATERAL 2 VIEW INCL AP PELVIS: ICD-10-PCS | Mod: 26,,, | Performed by: RADIOLOGY

## 2019-04-15 NOTE — TELEPHONE ENCOUNTER
Patient did receive results in another telephone encounter via Carin Ram LPN patient is now requesting advice about elevated B/P

## 2019-04-15 NOTE — TELEPHONE ENCOUNTER
----- Message from Yesi Lorenz sent at 4/15/2019 11:55 AM CDT -----  Contact: SAM BARRETT MRN [id number]  Pt had bloodwork recently and was told to expect a call regarding results. She would like a call back today if possible. Her best call back # today is [phone]
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
## 2019-04-15 NOTE — TELEPHONE ENCOUNTER
----- Message from RT Cyndy sent at 4/10/2019  3:48 PM CDT -----  Contact: pt  /    pt [phone] / [phone], requesting a call back for lab test results, thanks.

## 2019-04-15 NOTE — TELEPHONE ENCOUNTER
I do not have any pending results for this patient that do not have result notes, Dr Fischer does.

## 2019-04-15 NOTE — TELEPHONE ENCOUNTER
Spoke with patient, adv of labs. Pt is requesting you to call her back because she has questions about her next step with blood pressure still being high. pls adv.

## 2019-04-16 NOTE — TELEPHONE ENCOUNTER
----- Message from Isaura Swain sent at 4/16/2019 12:51 PM CDT -----  Type: Needs to reschedule today's nurse visit    Who Called:  Patient  Best Call Back Number: [phone]  Additional Information: Patient needs to reschedule today's nurse visit/please call back to reschedule with patient.

## 2019-05-08 ENCOUNTER — TELEPHONE (OUTPATIENT)
Dept: ADMINISTRATIVE | Facility: OTHER | Age: 54
End: 2019-05-08

## 2019-05-08 NOTE — TELEPHONE ENCOUNTER
Spoke with patient today and she stated that she's having some pain to both her neck/upper back as well as her low back and hips. She is about 3 months post op discectomy with anterior cervical fusion. She is complaining of trouble walking and sitting for prolonged periods of time. She would like to know if she should go ahead and get xrays or other imaging prior to her follow up. Please advise.

## 2019-05-14 DIAGNOSIS — Z98.1 S/P CERVICAL SPINAL FUSION: Primary | ICD-10-CM

## 2019-05-16 ENCOUNTER — TELEPHONE (OUTPATIENT)
Dept: FAMILY MEDICINE | Facility: CLINIC | Age: 54
End: 2019-05-16

## 2019-05-16 DIAGNOSIS — I10 ESSENTIAL HYPERTENSION: Primary | ICD-10-CM

## 2019-05-16 RX ORDER — LOSARTAN POTASSIUM 50 MG/1
50 TABLET ORAL DAILY
Qty: 90 TABLET | Refills: 3 | Status: SHIPPED | OUTPATIENT
Start: 2019-05-16 | End: 2019-11-22 | Stop reason: SDUPTHER

## 2019-05-16 NOTE — TELEPHONE ENCOUNTER
Patient is wanting to change BP meds. C/o swelling in feet and other side effects. Please advise.

## 2019-05-16 NOTE — TELEPHONE ENCOUNTER
----- Message from Evelina Adams sent at 5/16/2019  1:13 PM CDT -----  Type: Needs Medical Advice    Who Called:  Self Symptoms (please be specific):change blood pressure medications      How long has patient had these symptoms:     Pharmacy name and phone #:    [id number] - [place] - [address]  [address]  [place] [address]  Phone: [phone] Fax: [phone]    Best Call Back Number: [phone] Additional Information: has side effects / swelling in feet

## 2019-05-21 ENCOUNTER — HOSPITAL ENCOUNTER (OUTPATIENT)
Dept: RADIOLOGY | Facility: HOSPITAL | Age: 54
Discharge: HOME | End: 2019-05-21
Attending: STUDENT IN AN ORGANIZED HEALTH CARE EDUCATION/TRAINING PROGRAM
Payer: COMMERCIAL

## 2019-05-21 ENCOUNTER — OFFICE VISIT (OUTPATIENT)
Dept: NEUROSURGERY | Facility: CLINIC | Age: 54
End: 2019-05-21
Payer: COMMERCIAL

## 2019-05-21 DIAGNOSIS — M40.202 KYPHOSIS OF CERVICAL REGION, UNSPECIFIED KYPHOSIS TYPE: ICD-10-CM

## 2019-05-21 DIAGNOSIS — M43.12 SPONDYLOLISTHESIS, CERVICAL REGION: Primary | ICD-10-CM

## 2019-05-21 DIAGNOSIS — Z98.1 S/P CERVICAL SPINAL FUSION: ICD-10-CM

## 2019-05-21 DIAGNOSIS — M25.552 LEFT HIP PAIN: ICD-10-CM

## 2019-05-21 DIAGNOSIS — R13.10 DYSPHAGIA, UNSPECIFIED TYPE: ICD-10-CM

## 2019-05-21 PROCEDURE — 99999 PR PBB SHADOW E&M-EST. PATIENT-LVL II: CPT | Mod: PBBFAC,,, | Performed by: STUDENT IN AN ORGANIZED HEALTH CARE EDUCATION/TRAINING PROGRAM

## 2019-05-21 PROCEDURE — 99213 PR OFFICE/OUTPT VISIT, EST, LEVL III, 20-29 MIN: ICD-10-PCS | Mod: S$GLB,,, | Performed by: STUDENT IN AN ORGANIZED HEALTH CARE EDUCATION/TRAINING PROGRAM

## 2019-05-21 PROCEDURE — 72040 X-RAY EXAM NECK SPINE 2-3 VW: CPT | Mod: TC,FY,PO

## 2019-05-21 PROCEDURE — 72040 XR CERVICAL SPINE AP LATERAL: ICD-10-PCS | Mod: 26,,, | Performed by: RADIOLOGY

## 2019-05-21 PROCEDURE — 99999 PR PBB SHADOW E&M-EST. PATIENT-LVL II: ICD-10-PCS | Mod: PBBFAC,,, | Performed by: STUDENT IN AN ORGANIZED HEALTH CARE EDUCATION/TRAINING PROGRAM

## 2019-05-21 PROCEDURE — 72040 X-RAY EXAM NECK SPINE 2-3 VW: CPT | Mod: 26,,, | Performed by: RADIOLOGY

## 2019-05-21 PROCEDURE — 99213 OFFICE O/P EST LOW 20 MIN: CPT | Mod: S$GLB,,, | Performed by: STUDENT IN AN ORGANIZED HEALTH CARE EDUCATION/TRAINING PROGRAM

## 2019-05-22 NOTE — PROGRESS NOTES
REFERRING PHYSICIAN:    No ref. provider found  N/A    Postoperative visit #3      CLINICAL PROGRESS from last visit  Rabia Winn is now  Six weeks status post C3-C7 and anterior cervical diskectomy and fusion for cervical deformity  Overall she is doing well  She has significant improvement in her preoperative neck pain which is down to 2 to 3/10 with preop max of 10/10  She notes some muscle spasms and difficulty with right overhead ADLs such as combing hair with pain around the right trap and periscapular region  She is managing well at this  She is now out of her collar  She remains with residual dysphagia, maintaining calories but avoiding to offer foods and certain solids, no aspiration risk with current diet    X-rays were obtained which show intact hardware stable alignment no concerning features      Interval summary today  She returns today approximately 3 months postop with continued improvement in her neck pain and primary pathology, her x-rays looked  Great was stable alignment intact hardware  However, she continues to struggle with her right arm.  She has difficulty with overhead motion shows significant right shoulder pain with range of motion in this location  She continues to complain of left hip pain, tenderness to palpation over the greater trocar positive DEAN  She has known degenerative lumbar pathology  Of significant concern is her residual dysphagia.  She has no aspiration risk however limits her diet due to difficulty she is tolerating liquids and soft foods but essentially avoiding solids            MEDICATIONS:                   List reviewed, see chart for dosing details.    ALLERGIES:       Review of patient's allergies indicates:  No Known Allergies    PHYSICAL EXAMINATION:          VITAL SIGNS:   There were no vitals taken for this visit.    GENERAL:  Patient is well developed, well nourished, calm, collected, and in no apparent distress.  Incision is well healed, there is a  single small protruding Vicryl which was clean elevated and resected with scissors, remain clean dry intact without drainage    NEUROLOGICAL:  She has full strength  Except for right proximal arm.  She can abduct to 90° and has significant pain.  She can hold her arm against gravity at 180° but with significant pain and fatigue she has difficulty with external range of motion.  She has pain with passive range of motion at the shoulder joint    She also complains of left hip pain positive tender to palpation near the greater trochanter positive DEAN  Her incision is well healed     Sensation is intact to light touch.  Gait is intact    She has some tenderness in the right periscapular region right trapped.  She has some difficulty with overhead coordination with the right arm          No flowsheet data found.      ASSESSMENT/PLAN:  Three months status post complex 4 level ACDF for correction of cervical deformity  She has improved with her functional status and neck pain, her x-rays looked great  She can continue to struggle with several issues      1.  Right arm pain, limited mobility difficulty with external rotation and shoulder at AB duction greater than 90°             Differential diagnosis includes shoulder pathology, frozen shoulder rotator cuff Excedrin versus a postoperative C5 radiculopathy                We discussed obtain EMG nerve conduction study for diagnosis  And appropriate management    2.  She has residual postoperative dysphagia with solids        Does not feel that she has an aspiration risk however avoid solid foods essentially        She is frail have Nutrition concerns.  Discussed referral to GI further evaluation recommendations         She is only 3 months and hopefully this continues to improve    She will continue physical therapy L5 have her call the office after the EMG nerve conduction study  And plan on seeing her back in 6 weeks or so      Advised to call the office Iif any  questions or concerns arise.    This note was partially dictated using voice recognition software, so please excuse any errors that were not corrected.

## 2019-05-27 DIAGNOSIS — M25.559 ARTHRALGIA OF HIP, UNSPECIFIED LATERALITY: Primary | ICD-10-CM

## 2019-07-14 DIAGNOSIS — I10 ESSENTIAL HYPERTENSION: ICD-10-CM

## 2019-07-14 RX ORDER — AMLODIPINE BESYLATE 5 MG/1
2.5 TABLET ORAL DAILY
Qty: 45 TABLET | Refills: 2 | OUTPATIENT
Start: 2019-07-14 | End: 2020-07-13

## 2019-07-16 ENCOUNTER — TELEPHONE (OUTPATIENT)
Dept: NEUROLOGY | Facility: CLINIC | Age: 54
End: 2019-07-16

## 2019-07-16 NOTE — TELEPHONE ENCOUNTER
----- Message from Kym Barry LPN sent at 7/16/2019 11:06 AM CDT -----  PLease contact patient to schedule EMG. Thank you!!

## 2019-07-17 ENCOUNTER — TELEPHONE (OUTPATIENT)
Dept: NEUROLOGY | Facility: CLINIC | Age: 54
End: 2019-07-17

## 2019-07-17 NOTE — TELEPHONE ENCOUNTER
----- Message from Maritza Adams sent at 7/16/2019  4:51 PM CDT -----  Contact: pt  Calling in regards to a missed call and appointment and please advise [phone]

## 2019-07-17 NOTE — TELEPHONE ENCOUNTER
Spoke w pt and scheduled EMG ref Dr. Kerr. Date/time/location confirmed. Verbalized understanding.

## 2019-08-02 ENCOUNTER — TELEPHONE (OUTPATIENT)
Dept: NEUROLOGY | Facility: CLINIC | Age: 54
End: 2019-08-02

## 2019-08-02 ENCOUNTER — PROCEDURE VISIT (OUTPATIENT)
Dept: NEUROLOGY | Facility: CLINIC | Age: 54
End: 2019-08-02
Payer: COMMERCIAL

## 2019-08-02 DIAGNOSIS — M54.12 CERVICAL RADICULOPATHY: ICD-10-CM

## 2019-08-02 DIAGNOSIS — M47.812 SPONDYLOSIS OF CERVICAL REGION WITHOUT MYELOPATHY OR RADICULOPATHY: ICD-10-CM

## 2019-08-02 PROCEDURE — 95910 NRV CNDJ TEST 7-8 STUDIES: CPT | Mod: S$GLB,,, | Performed by: PSYCHIATRY & NEUROLOGY

## 2019-08-02 PROCEDURE — 95910 PR NERVE CONDUCTION STUDY; 7-8 STUDIES: ICD-10-PCS | Mod: S$GLB,,, | Performed by: PSYCHIATRY & NEUROLOGY

## 2019-08-02 PROCEDURE — 95886 PR EMG COMPLETE, W/ NERVE CONDUCTION STUDIES, 5+ MUSCLES: ICD-10-PCS | Mod: S$GLB,,, | Performed by: PSYCHIATRY & NEUROLOGY

## 2019-08-02 PROCEDURE — 95886 MUSC TEST DONE W/N TEST COMP: CPT | Mod: S$GLB,,, | Performed by: PSYCHIATRY & NEUROLOGY

## 2019-08-02 RX ORDER — CELECOXIB 200 MG/1
200 CAPSULE ORAL DAILY PRN
Qty: 30 CAPSULE | Refills: 1 | Status: SHIPPED | OUTPATIENT
Start: 2019-08-02 | End: 2019-10-02 | Stop reason: SDUPTHER

## 2019-08-02 NOTE — TELEPHONE ENCOUNTER
Spoke with pt and rescheduled EMG to today; date/time/location confirmed; verbalized understanding.

## 2019-08-02 NOTE — TELEPHONE ENCOUNTER
----- Message from Bernie Omalley sent at 8/2/2019 11:16 AM CDT -----  Contact: self   Patient want to speak with a nurse regarding EMG and earlier conversation please call back at [phone]

## 2019-08-02 NOTE — TELEPHONE ENCOUNTER
Called pt and attempted to reschedule appt to something earlier than end of August. Pt offered 1300 today; states that she will call us back.

## 2019-08-02 NOTE — TELEPHONE ENCOUNTER
----- Message from Isaura Guzman sent at 8/2/2019 10:59 AM CDT -----  Type: Needs Medical Advice    Who Called:  Patient   Best Call Back Number: [phone]  Additional Information: contact patient to reschedule her EMG from 08/30/19, she is requesting a sooner appointment     Thank you

## 2019-08-15 ENCOUNTER — HOSPITAL ENCOUNTER (OUTPATIENT)
Dept: RADIOLOGY | Facility: HOSPITAL | Age: 54
Discharge: HOME | End: 2019-08-15
Attending: INTERNAL MEDICINE
Payer: COMMERCIAL

## 2019-08-15 DIAGNOSIS — R13.10 DYSPHAGIA: ICD-10-CM

## 2019-08-15 PROCEDURE — A9698 NON-RAD CONTRAST MATERIALNOC: HCPCS | Mod: PO | Performed by: INTERNAL MEDICINE

## 2019-08-15 PROCEDURE — 74220 X-RAY XM ESOPHAGUS 1CNTRST: CPT | Mod: TC,PO

## 2019-08-15 PROCEDURE — 74220 X-RAY XM ESOPHAGUS 1CNTRST: CPT | Mod: 26,,, | Performed by: RADIOLOGY

## 2019-08-15 PROCEDURE — 25500020 PHARM REV CODE 255: Mod: PO | Performed by: INTERNAL MEDICINE

## 2019-08-15 PROCEDURE — 74220 FL ESOPHAGRAM COMPLETE: ICD-10-PCS | Mod: 26,,, | Performed by: RADIOLOGY

## 2019-08-15 RX ADMIN — BARIUM SULFATE 135 ML: 980 POWDER, FOR SUSPENSION ORAL at 10:08

## 2019-08-20 ENCOUNTER — OFFICE VISIT (OUTPATIENT)
Dept: NEUROSURGERY | Facility: CLINIC | Age: 54
End: 2019-08-20
Payer: COMMERCIAL

## 2019-08-20 VITALS — HEART RATE: 73 BPM | DIASTOLIC BLOOD PRESSURE: 94 MMHG | SYSTOLIC BLOOD PRESSURE: 146 MMHG

## 2019-08-20 DIAGNOSIS — Z98.1 S/P CERVICAL SPINAL FUSION: Primary | ICD-10-CM

## 2019-08-20 PROCEDURE — 99999 PR PBB SHADOW E&M-EST. PATIENT-LVL III: CPT | Mod: PBBFAC,,, | Performed by: STUDENT IN AN ORGANIZED HEALTH CARE EDUCATION/TRAINING PROGRAM

## 2019-08-20 PROCEDURE — 99213 OFFICE O/P EST LOW 20 MIN: CPT | Mod: S$GLB,,, | Performed by: STUDENT IN AN ORGANIZED HEALTH CARE EDUCATION/TRAINING PROGRAM

## 2019-08-20 PROCEDURE — 99213 PR OFFICE/OUTPT VISIT, EST, LEVL III, 20-29 MIN: ICD-10-PCS | Mod: S$GLB,,, | Performed by: STUDENT IN AN ORGANIZED HEALTH CARE EDUCATION/TRAINING PROGRAM

## 2019-08-20 PROCEDURE — 99999 PR PBB SHADOW E&M-EST. PATIENT-LVL III: ICD-10-PCS | Mod: PBBFAC,,, | Performed by: STUDENT IN AN ORGANIZED HEALTH CARE EDUCATION/TRAINING PROGRAM

## 2019-08-20 NOTE — PROCEDURES
[facility]   [facility]  [address]  [place] [address]  [phone]             Full Name: Rabia Winn Gender: Female  Patient ID:  [id number] YOB: 1965      Visit Date: 8/2/2019 13:28  Age: 53 Years 9 Months Old  Examining Physician: Chyna Chung D.O., ABPN, AOBNP, ABEM  Referring Physician: Jesus Kerr M.D.   Height: 5 feet 1 inch  History: Patient states she had a 4 level neck fusion in February due to neck issues.  Now she has weakness in the right arm, in particular the right hand and shoulder.  Evaluate for radiculopathy versus other.  Both arms have been requested.      Sensory NCS      Nerve / Sites Rec. Site Onset Lat Peak Lat NP Amp Segments Distance Velocity Temp.     ms ms µV  cm m/s °C   R Median - Digit II (Antidromic)      Wrist Dig II 2.14 3.02 26.7 Wrist - Dig II 13 61 37      Ref.   ?3.60 ?15.0 Ref.  ?50    L Median - Digit II (Antidromic)      Wrist Dig II 2.40 3.13 28.5 Wrist - Dig II 13 54 37      Ref.   ?3.60 ?15.0 Ref.  ?50    R Ulnar - Digit V (Antidromic)      Wrist Dig V 1.82 2.71 26.3 Wrist - Dig V 11 60 37      Ref.   ?3.10 ?12.0 Ref.  ?50    L Ulnar - Digit V (Antidromic)      Wrist Dig V 2.03 2.71 24.4 Wrist - Dig V 11 54 37      Ref.   ?3.10 ?12.0 Ref.  ?50        Motor NCS      Nerve / Sites Muscle Latency Ref. Amplitude Ref. Amp % Duration Segments Distance Lat Diff Velocity Ref. Temp.     ms ms mV mV % ms  cm ms m/s m/s °C   R Median - APB      Wrist APB 2.60 ?4.00 11.6 ?6.0 100 5.63 Wrist - APB     37      Elbow APB 6.25  11.6  100 5.73 Elbow - Wrist 20 3.65 55 ?50 37   L Median - APB      Wrist APB 2.92 ?4.00 8.6 ?6.0 100 6.04 Wrist - APB     37   R Ulnar - ADM      Wrist ADM 1.93 ?3.10 9.3 ?7.0 100 6.51 Wrist - ADM     37      B.Elbow ADM 5.10  9.5  103 6.41 B.Elbow - Wrist 19.5 3.18 61 ?50 37      A.Elbow ADM 6.67  9.2  99.5 6.46 A.Elbow - B.Elbow 12 1.56 77  37   L Ulnar - ADM      Wrist ADM 2.24 ?3.10  8.6 ?7.0 100 5.31 Wrist - ADM     37       EMG Summary Table     Spontaneous Recruitment Activation Duration Amplitude Polyphasia Comment   Muscle Ins Act Fib Fasc Pattern - - - - -   R. First dorsal interosseous Normal 0 0 Normal Normal Normal Normal Normal Normal   R. Abductor pollicis brevis Normal 0 0 Normal Normal Normal Normal Normal Normal   R. Pronator teres Inc +2 0 Sl Dec Normal N/+1 +1 +1 Normal   R. Extensor digitorum communis Normal 0 0 Normal Normal Normal Normal Normal Normal   R. Biceps brachii Normal 0 0 Sl Dec Normal Normal Normal N/+1 Normal   R. Triceps brachii Inc +2 0 Sl Dec Normal +1 +1 +1 Normal   R. Deltoid Normal 0 0 Sl Dec Normal N/+1 +1 N/+1 Normal   R. Infraspinatus Normal 0 0 Sl Dec Normal +1 +2 +1 Normal   R. Cervical paraspinals Normal 0 0      Normal   L. First dorsal interosseous Normal 0 0 Normal Normal Normal Normal Normal Normal   L. Abductor pollicis brevis Normal 0 0 Normal Normal Normal Normal Normal Normal   L. Pronator teres Normal 0 0 Normal Normal Normal Normal Normal Normal   L. Extensor digitorum communis Normal 0 0 Normal Normal Normal Normal Normal Normal   L. Biceps brachii Normal 0 0 Sl Dec Normal Normal Normal N/+1 Normal   L. Triceps brachii Normal 0 0 Normal Normal Normal Normal Normal Normal   L. Deltoid Normal 0 0 Sl Dec Normal Normal Normal N/+1 Normal   L. Cervical paraspinals Normal 0 0 Normal Normal Normal Normal Normal Normal         Summary:  Nerve conduction studies were performed on both upper extremities.  Bilateral median and ulnar sensory responses were normal in amplitude and latency.  Bilateral median and ulnar motor responses were normal in amplitude and latency.  Needle EMG was performed in both upper extremities and cervical paraspinal muscles.  Active denervation was present in the right pronator teres and triceps muscles.  Motor units were large, long and poly phasic with reduced recruitment in the right pronator teres, triceps, deltoid and  infraspinatus muscles.  Motor units were poly phasic with reduced recruitment in the right biceps muscle.  In addition, motor units were poly phasic with reduced recruitment in the left biceps and deltoid muscles.  All other motor unit morphology and recruitment patterns were normal.    Impression:  This is an abnormal EMG of both upper extremities.  There is electrophysiologic evidence of the followin. Chronic, mild to moderate, right C6 radiculopathy with active denervation.  There is evidence that is suggestive but not diagnostic of involvement of the C7 root as well.  Clinical correlation is recommended.  2. Chronic, very mild, left C5-6 radiculopathy without active denervation.  There is no evidence of any other focal neuropathy, plexopathy, peripheral neuropathy, radiculopathy on this study.      Thank you for referring to the [facility] in [place].  Please feel free to contact the clinic if you have any further questions regarding this study or report.        ____________________________  Chyna Chung D.O., ABPN, AOBNP, АННА

## 2019-08-20 NOTE — PROGRESS NOTES
REFERRING PHYSICIAN:    No ref. provider found      CLINICAL PROGRESS:   Rabia Winn is now  Six months status post 4 level ACDF for cervical kyphosis  She continues to improve with improved neck pain and radiculopathy  Her right deltoid strength is improving and is antigravity  She remains with paraspinal/trap pain worse on the right but has return to the gym is working out    She had an EGD and balloon dilation of her esophagus with Dr. Carr with minimal improvement  Her swelling has slowly improved over time she still has a modified diet with positive, spine get 80 Peter motor jelly sandwich with liquids.  Solid a more difficult than liquids to swallow she has calorie supplementing    MEDICATIONS:                   List reviewed, see chart for dosing details.    ALLERGIES:       Review of patient's allergies indicates:  No Known Allergies    PHYSICAL EXAMINATION:          VITAL SIGNS:   BP (!) 146/94   Pulse 73     GENERAL:  Patient is well developed, well nourished, calm, collected, and in no apparent distress.  Incision is well healed    NEUROLOGICAL:  Strength is full except right deltoid 4-out of 5, able to the abduct and elevate above head  For out of 5 in right hand     Date of  EMG nerve conduction study reveals C6 and potentially C7 radiculopathy with denervation, no peripheral entrapment neuropathy      No flowsheet data found.      ASSESSMENT/PLAN:      Six months postop 4 level ACDF for deformity  Improved neck pain and radiculopathy  Still struggling with paraspinal and trap pain her strength and coordination in the right upper extremity is improving but still remains with difficulty of coordination and dexterity  She has been referred to the back and spine physical program for consideration of physical therapy, incoordination consideration of dry and healing  She is referred to pain management for treatment options for paraspinal muscular pain, consider Botox or trigger point  injections  She will follow-up with Dr. Carr/GI  Plan on and x-rays and plan on follow-up around November          Advised to call the office Iif any questions or concerns arise.    This note was partially dictated using voice recognition software, so please excuse any errors that were not corrected.

## 2019-08-23 DIAGNOSIS — Z98.1 S/P CERVICAL SPINAL FUSION: Primary | ICD-10-CM

## 2019-09-03 ENCOUNTER — OFFICE VISIT (OUTPATIENT)
Dept: PAIN MEDICINE | Facility: CLINIC | Age: 54
End: 2019-09-03
Payer: COMMERCIAL

## 2019-09-03 VITALS
DIASTOLIC BLOOD PRESSURE: 72 MMHG | BODY MASS INDEX: 19.12 KG/M2 | TEMPERATURE: 98 F | SYSTOLIC BLOOD PRESSURE: 119 MMHG | RESPIRATION RATE: 18 BRPM | WEIGHT: 101.19 LBS | OXYGEN SATURATION: 99 % | HEART RATE: 65 BPM

## 2019-09-03 DIAGNOSIS — M79.10 MYALGIA: Primary | ICD-10-CM

## 2019-09-03 PROCEDURE — 20552 PR INJECT TRIGGER POINT, 1 OR 2: ICD-10-PCS | Mod: S$GLB,,, | Performed by: ANESTHESIOLOGY

## 2019-09-03 PROCEDURE — 99999 PR PBB SHADOW E&M-EST. PATIENT-LVL III: ICD-10-PCS | Mod: PBBFAC,,, | Performed by: ANESTHESIOLOGY

## 2019-09-03 PROCEDURE — 20552 NJX 1/MLT TRIGGER POINT 1/2: CPT | Mod: S$GLB,,, | Performed by: ANESTHESIOLOGY

## 2019-09-03 PROCEDURE — 99999 PR PBB SHADOW E&M-EST. PATIENT-LVL III: CPT | Mod: PBBFAC,,, | Performed by: ANESTHESIOLOGY

## 2019-09-03 PROCEDURE — 99213 PR OFFICE/OUTPT VISIT, EST, LEVL III, 20-29 MIN: ICD-10-PCS | Mod: 25,S$GLB,, | Performed by: ANESTHESIOLOGY

## 2019-09-03 PROCEDURE — 99213 OFFICE O/P EST LOW 20 MIN: CPT | Mod: 25,S$GLB,, | Performed by: ANESTHESIOLOGY

## 2019-09-03 RX ORDER — BACLOFEN 10 MG/1
10 TABLET ORAL 2 TIMES DAILY PRN
Qty: 60 TABLET | Refills: 0 | Status: SHIPPED | OUTPATIENT
Start: 2019-09-03 | End: 2019-09-30 | Stop reason: SDUPTHER

## 2019-09-03 RX ORDER — METHYLPREDNISOLONE ACETATE 40 MG/ML
40 INJECTION, SUSPENSION INTRA-ARTICULAR; INTRALESIONAL; INTRAMUSCULAR; SOFT TISSUE
Status: COMPLETED | OUTPATIENT
Start: 2019-09-03 | End: 2019-09-03

## 2019-09-03 RX ADMIN — METHYLPREDNISOLONE ACETATE 40 MG: 40 INJECTION, SUSPENSION INTRA-ARTICULAR; INTRALESIONAL; INTRAMUSCULAR; SOFT TISSUE at 02:09

## 2019-09-03 NOTE — LETTER
September 3, 2019      Jesus Kerr MD  [address] [place] [address]           [facility]  [address] [place] [address]  Phone: [phone]  Fax: [phone]          Patient: Rabia Winn   MR Number: [id number]   YOB: 1965   Date of Visit: 9/3/2019       Dear Dr. Jesus Kerr:    Thank you for referring Rabia Winn to me for evaluation. Attached you will find relevant portions of my assessment and plan of care.    If you have questions, please do not hesitate to call me. I look forward to following Rabia Winn along with you.    Sincerely,    Charlie Bowles MD    Enclosure  CC:  No Recipients    If you would like to receive this communication electronically, please contact [email or web address] or [phone] to request more information on [employer] access.    For providers and/or their staff who would like to refer a patient to [facility], please contact us through our one-stop-shop provider referral line, [facility], at [phone].    If you feel you have received this communication in error or would no longer like to receive these types of communications, please e-mail [email or web address]
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
## 2019-09-12 ENCOUNTER — HOSPITAL ENCOUNTER (OUTPATIENT)
Dept: RADIOLOGY | Facility: HOSPITAL | Age: 54
Discharge: HOME | End: 2019-09-12
Attending: STUDENT IN AN ORGANIZED HEALTH CARE EDUCATION/TRAINING PROGRAM
Payer: COMMERCIAL

## 2019-09-12 DIAGNOSIS — Z98.1 S/P CERVICAL SPINAL FUSION: ICD-10-CM

## 2019-09-12 PROCEDURE — 72040 X-RAY EXAM NECK SPINE 2-3 VW: CPT | Mod: TC,FY,PO

## 2019-09-12 PROCEDURE — 72040 X-RAY EXAM NECK SPINE 2-3 VW: CPT | Mod: 26,,, | Performed by: RADIOLOGY

## 2019-09-12 PROCEDURE — 72040 XR CERVICAL SPINE AP LATERAL: ICD-10-PCS | Mod: 26,,, | Performed by: RADIOLOGY

## 2019-09-13 ENCOUNTER — CLINICAL SUPPORT (OUTPATIENT)
Dept: REHABILITATION | Facility: HOSPITAL | Age: 54
End: 2019-09-13
Attending: STUDENT IN AN ORGANIZED HEALTH CARE EDUCATION/TRAINING PROGRAM
Payer: COMMERCIAL

## 2019-09-13 DIAGNOSIS — M54.2 CERVICALGIA: ICD-10-CM

## 2019-09-13 PROCEDURE — 97162 PT EVAL MOD COMPLEX 30 MIN: CPT | Mod: PO | Performed by: PHYSICAL THERAPIST

## 2019-09-13 PROCEDURE — 97110 THERAPEUTIC EXERCISES: CPT | Mod: PO | Performed by: PHYSICAL THERAPIST
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
## 2019-09-17 ENCOUNTER — CLINICAL SUPPORT (OUTPATIENT)
Dept: REHABILITATION | Facility: HOSPITAL | Age: 54
End: 2019-09-17
Attending: STUDENT IN AN ORGANIZED HEALTH CARE EDUCATION/TRAINING PROGRAM
Payer: COMMERCIAL

## 2019-09-17 DIAGNOSIS — M54.2 CERVICALGIA: ICD-10-CM

## 2019-09-17 PROCEDURE — 97110 THERAPEUTIC EXERCISES: CPT | Mod: PO

## 2019-09-17 NOTE — PROGRESS NOTES
[facility] Physical Therapy Treatment      Name: Rabia Winn  Clinic Number: [id number]    Therapy Diagnosis:   Encounter Diagnosis   Name Primary?    Cervicalgia      Physician: Jesus Kerr MD    Visit Date: 2019  Physician Orders: Amb referral to [facility]   Medical Diagnosis from Referral: s/p cervical fusion  Evaluation Date: 2019  Authorization Period Expiration: 19  Plan of Care Expiration: 19  Reassessment Due: 10/18/19  Visit # / Visits authorized:      Time In: 3:00  Time Out: 4:20p  Total Billable Time: 30 minutes     Precautions: ACDF 19     Pattern of pain determined: REP      Subjective   Rabia reports no change in sx. She states that she was told that she would be getting Dry Needling today..      Patient reports tolerating previous visit some R shld and scap soreness  Patient reports their pain to be 3/10 on a 0-10 scale with 0 being no pain and 10 being the worst pain imaginable.  Pain Location: R shld and scap     Work and leisure: Occupation: , lots of sitting  Leisure: working out 7 days, playing tennis 3 days a week        Pt goals: no pain, build my strength back up       Objective     Baseline IM Testing Results:   Date of testin19  ROM 48-75 deg   Max Peak Torque 84    Min Peak Torque 62    Flex/Ext Ratio 1.4:1   % below normative data 61%         Outcomes:  Initial score:Limitation Score: 58%   Visit 5 score:  Goal:      Treatment    Pt was instructed in and performed the following:     Rabia received therapeutic exercises to develop/improved posture, cardiovascular endurance, muscular endurance, lumbar/cervical ROM, strength and muscular endurance for 50 minutes including the following exercises:   Scap retracton 15x 3 sec hold  Supine c-retraction 15x 3 sec hold  Supine c-rotation 15x B  Supine chin tuck 15x  B UT stretch 3 x 20 sec  B Lev Scap stretch 3 x 20 sec          Peripheral muscle strengthening which  included 1 set of 15-20 repetitions at a slow, controlled 10-13 second per rep pace focused on strengthening supporting musculature for improved body mechanics and functional mobility.  Pt and therapist focused on proper form during treatment to ensure optimal strengthening of each targeted muscle group.  Machines were utilized including torso rotation, leg extension, leg curl, chest press, upright row. Tricep extension, bicep curl, leg press, and hip abduction added visit 3    Rabia received the following manual therapy techniques:  were applied to the:  for 00 minutes.         Home Exercises Provided and Patient Education Provided     Education provided:   - response to therapy  -importance and role of HEP    Written Home Exercises Provided: Patient instructed to cont prior HEP.  Exercises were reviewed and Rabia was able to demonstrate them prior to the end of the session.  Rabia demonstrated good  understanding of the education provided.     See EMR under Patient Instructions for exercises provided prior visit.          Assessment   Pt erick tx with ther ex well, she was able to erick MedEx cervical ext machine and peripheral equipment w/o increase in c/o pn. Pt with significant B scap winging with UE exs    Patient is making good progress towards established goals.  Pt will continue to benefit from skilled outpatient physical therapy to address the deficits stated in the impairment chart, provide pt/family education and to maximize pt's level of independence in the home and community environment.     Anticipated Barriers for therapy: residual deficits from surgery     Pt's spiritual, cultural and educational needs considered and pt agreeable to plan of care and goals as stated below:         Goals:   Short term goals: 6 weeks or 10 visits   1.  Pt will demonstratte increased cervical ROM as measured by med ex by 3 degrees from initial test which results in improved  ROM of neck for ease with ADLs and driving  2.  Pt will demonstrate independence with reducing or controlling symptoms with ther ex, movement, or position independently, able to reduce pain 1-2 points on pain scale using strategies taught in therapy  3. Pt will demonstrate increased maximum isometric torque value by 25% when compared to the initial value resulting in improved ability to perform bending, lifting, and carrying activities safely, confidently.           Long term goals: 13 weeks or 20 visits  1. Pt will demonstratte increased cervical ROM as measured by med ex by 6 degrees from initial test which results in functional ROM of neck for ease with ADLs and driving  2. Pt will demonstrate increased isometric torque by 50% from initial test to improve ability to lift and carry, and sustain good posture while performing ADL's  3.Pt will demonstrate reduced pain and improved functional outcomes as reported on the FOTO by reaching a score of CJ = at least 20% but < 40% impaired, limited or restricted or less in order to demonstrate subjective improvement in pt's condition.    4. Pt will demonstrate independence with reducing or controlling symptoms with ther ex, movement, or position independently, able to reduce pain 2-4 points on pain scale using strategies taught in therapy  5. Pt will demonstrate independence with the HEP at discharge.   6.  Be able to swing tennis racket without pain.         Plan   Continue with established Plan of Care towards established PT goals.   Outpatient physical therapy 2x week for 10 weeks or 20 visits to include the following:   - Patient education  - Therapeutic exercise  - Manual therapy  - Performance testing   - Neuromuscular Re-education  - Therapeutic activity   - Modalities     Pt may be seen by PTA as part of the rehabilitation team.

## 2019-09-20 ENCOUNTER — CLINICAL SUPPORT (OUTPATIENT)
Dept: REHABILITATION | Facility: HOSPITAL | Age: 54
End: 2019-09-20
Attending: STUDENT IN AN ORGANIZED HEALTH CARE EDUCATION/TRAINING PROGRAM
Payer: COMMERCIAL

## 2019-09-20 DIAGNOSIS — M54.2 CERVICALGIA: ICD-10-CM

## 2019-09-20 PROCEDURE — 97140 MANUAL THERAPY 1/> REGIONS: CPT | Mod: PO | Performed by: PHYSICAL THERAPIST

## 2019-09-20 PROCEDURE — 97110 THERAPEUTIC EXERCISES: CPT | Mod: PO | Performed by: PHYSICAL THERAPIST

## 2019-09-23 NOTE — PROGRESS NOTES
[facility]      Name: Rabia Winn  Clinic Number: [id number]    Therapy Diagnosis:   Encounter Diagnosis   Name Primary?    Cervicalgia      Physician: Jesus Kerr MD    Visit Date: 2019  Physician Orders: Amb referral to [facility]   Medical Diagnosis from Referral: s/p cervical fusion  Evaluation Date: 2019  Authorization Period Expiration: 19  Plan of Care Expiration: 19  Reassessment Due: 10/18/19  Visit # / Visits authorized: 3/ 13     Time In: 1100 am  Time Out: 1200pm  Total Billable Time: 50 minutes     Precautions: ACDF 19     Pattern of pain determined: REP      Subjective   Rabia reports she is feeling tight today in the neck and across shoulders. Would like dry needling today.     Patient reports tolerating previous visit some R shld and scap soreness  Patient reports their pain to be 3/10 on a 0-10 scale with 0 being no pain and 10 being the worst pain imaginable.  Pain Location: R shld and scap     Work and leisure: Occupation: , lots of sitting  Leisure: working out 7 days, playing tennis 3 days a week        Pt goals: no pain, build my strength back up       Objective     Baseline IM Testing Results:   Date of testin19  ROM 48-75 deg   Max Peak Torque 84    Min Peak Torque 62    Flex/Ext Ratio 1.4:1   % below normative data 61%         Outcomes:  Initial score:Limitation Score: 58%   Visit 5 score:  Goal:      Treatment    Pt was instructed in and performed the following:     Rabia received therapeutic exercises to develop/improved posture, cardiovascular endurance, muscular endurance, lumbar/cervical ROM, strength and muscular endurance for 30 minutes including the following exercises:     Supine c-rotation 15x B  B UT stretch 3 x 20 sec  B Lev Scap stretch 3 x 20 sec    [facility] 2019   Visit Number 3   VAS Pain Rating 6   Retraction in Sitting -   Sidebending 3   Rotation 15   Manual  Therapy 20   Cervical Extension Seat Pad -   Seat Adjustment -   Top Dead Center -   Counterweight -   Cervical Flexion -   Cervical Extension -   Cervical Peak Torque -   Cervical Weight 72   Repetitions 20   Rating of Perceived Exertion 3   Ice - Z Lie (in min.) 10           Peripheral muscle strengthening which included 1 set of 15-20 repetitions at a slow, controlled 10-13 second per rep pace focused on strengthening supporting musculature for improved body mechanics and functional mobility.  Pt and therapist focused on proper form during treatment to ensure optimal strengthening of each targeted muscle group.  Machines were utilized including torso rotation, leg extension, leg curl, chest press, upright row. Tricep extension, bicep curl, leg press, and hip abduction added visit 3    Rabia received the following manual therapy techniques x 20 minutes:      Discussed the purpose, mechanism, and indications for dry needling with Rabia. Patient was cleared of all precautions and contraindications and pt signed written consent and gave verbal consent to dry needling Rx today.   Palpation used to determine dry needling sites. Increased tone noted at R upper trapezius muscle. Pt rec'd dry needling in prone position to R upper trapezius trigger point, pistoning technique utilized into the trigger point.   Pt tolerated treatment well and was not in any distress at the completion of treatment.    STM to the R latissimus dorsi.         Home Exercises Provided and Patient Education Provided     Education provided:   - response to therapy  -importance and role of HEP    Written Home Exercises Provided: Patient instructed to cont prior HEP.  Exercises were reviewed and Rabia was able to demonstrate them prior to the end of the session.  Rabia demonstrated good  understanding of the education provided.     See EMR under Patient Instructions for exercises provided prior visit.          Assessment   Pt tolerated first  session of dry needling well without adverse reaction. Tightness of the latissimus dorsi noted, which is likely contributing to scapular pain and dysfunction.     Patient is making good progress towards established goals.  Pt will continue to benefit from skilled outpatient physical therapy to address the deficits stated in the impairment chart, provide pt/family education and to maximize pt's level of independence in the home and community environment.     Anticipated Barriers for therapy: residual deficits from surgery     Pt's spiritual, cultural and educational needs considered and pt agreeable to plan of care and goals as stated below:         Goals:   Short term goals: 6 weeks or 10 visits   1.  Pt will demonstratte increased cervical ROM as measured by med ex by 3 degrees from initial test which results in improved  ROM of neck for ease with ADLs and driving  2. Pt will demonstrate independence with reducing or controlling symptoms with ther ex, movement, or position independently, able to reduce pain 1-2 points on pain scale using strategies taught in therapy  3. Pt will demonstrate increased maximum isometric torque value by 25% when compared to the initial value resulting in improved ability to perform bending, lifting, and carrying activities safely, confidently.           Long term goals: 13 weeks or 20 visits  1. Pt will demonstratte increased cervical ROM as measured by med ex by 6 degrees from initial test which results in functional ROM of neck for ease with ADLs and driving  2. Pt will demonstrate increased isometric torque by 50% from initial test to improve ability to lift and carry, and sustain good posture while performing ADL's  3.Pt will demonstrate reduced pain and improved functional outcomes as reported on the FOTO by reaching a score of CJ = at least 20% but < 40% impaired, limited or restricted or less in order to demonstrate subjective improvement in pt's condition.    4. Pt will  demonstrate independence with reducing or controlling symptoms with ther ex, movement, or position independently, able to reduce pain 2-4 points on pain scale using strategies taught in therapy  5. Pt will demonstrate independence with the HEP at discharge.   6.  Be able to swing tennis racket without pain.         Plan   Continue with established Plan of Care towards established PT goals.   Outpatient physical therapy 2x week for 10 weeks or 20 visits to include the following:   - Patient education  - Therapeutic exercise  - Manual therapy  - Performance testing   - Neuromuscular Re-education  - Therapeutic activity   - Modalities     Pt may be seen by PTA as part of the rehabilitation team.

## 2019-09-24 ENCOUNTER — CLINICAL SUPPORT (OUTPATIENT)
Dept: REHABILITATION | Facility: HOSPITAL | Age: 54
End: 2019-09-24
Attending: STUDENT IN AN ORGANIZED HEALTH CARE EDUCATION/TRAINING PROGRAM
Payer: COMMERCIAL

## 2019-09-24 DIAGNOSIS — M54.2 CERVICALGIA: ICD-10-CM

## 2019-09-24 PROCEDURE — 97110 THERAPEUTIC EXERCISES: CPT | Mod: PO

## 2019-09-24 NOTE — PROGRESS NOTES
[facility]      Name: Rabia Winn  Clinic Number: [id number]    Therapy Diagnosis:   Encounter Diagnosis   Name Primary?    Cervicalgia      Physician: Jesus Kerr MD    Visit Date: 2019  Physician Orders: Amb referral to [facility]   Medical Diagnosis from Referral: s/p cervical fusion  Evaluation Date: 2019  Authorization Period Expiration: 19  Plan of Care Expiration: 19  Reassessment Due: 10/18/19  Visit # / Visits authorized: 3/ 13     Time In: 1100 am  Time Out: 1200pm  Total Billable Time: 50 minutes     Precautions: ACDF 19     Pattern of pain determined: REP      Subjective   Rabia reports she is feeling a little better, feels that the DN helped loosen her up a little.     Patient reports tolerating previous visit some R shld and scap soreness  Patient reports their pain to be 0/10 on a 0-10 scale with 0 being no pain and 10 being the worst pain imaginable.  Pain Location: R shld and scap     Work and leisure: Occupation: , lots of sitting  Leisure: working out 7 days, playing tennis 3 days a week        Pt goals: no pain, build my strength back up       Objective     Baseline IM Testing Results:   Date of testin19  ROM 48-75 deg   Max Peak Torque 84    Min Peak Torque 62    Flex/Ext Ratio 1.4:1   % below normative data 61%         Outcomes:  Initial score:Limitation Score: 58%   Visit 5 score:  Goal:      Treatment    Pt was instructed in and performed the following:     Rabia received therapeutic exercises to develop/improved posture, cardiovascular endurance, muscular endurance, lumbar/cervical ROM, strength and muscular endurance for 30 minutes including the following exercises:   UBE 3/3  Supine c-rotation 20x B  Supine c-retraction 20x  Seated Scap retraction  B UT stretch 3 x 20 sec  B Lev Scap stretch 3 x 20 sec    [facility] 2019   Visit Number 4   VAS Pain Rating 0   Retraction in  Sitting -   Sidebending 3   Rotation 20   Manual Therapy -   Cervical Extension Seat Pad -   Seat Adjustment -   Top Dead Center -   Counterweight -   Cervical Flexion -   Cervical Extension -   Cervical Peak Torque -   Cervical Weight 72   Repetitions 20   Rating of Perceived Exertion 3   Ice - Z Lie (in min.) 10       Peripheral muscle strengthening which included 1 set of 15-20 repetitions at a slow, controlled 10-13 second per rep pace focused on strengthening supporting musculature for improved body mechanics and functional mobility.  Pt and therapist focused on proper form during treatment to ensure optimal strengthening of each targeted muscle group.  Machines were utilized including torso rotation, leg extension, leg curl, chest press, upright row. Tricep extension, bicep curl, leg press, and hip abduction added visit 3    Rabia received the following manual therapy techniques x 0 minutes:      Discussed the purpose, mechanism, and indications for dry needling with Rabia. Patient was cleared of all precautions and contraindications and pt signed written consent and gave verbal consent to dry needling Rx today.   Palpation used to determine dry needling sites. Increased tone noted at R upper trapezius muscle. Pt rec'd dry needling in prone position to R upper trapezius trigger point, pistoning technique utilized into the trigger point.   Pt tolerated treatment well and was not in any distress at the completion of treatment. Not performed on this date    .         Home Exercises Provided and Patient Education Provided     Education provided:   - response to therapy  -importance and role of HEP    Written Home Exercises Provided: Patient instructed to cont prior HEP.  Exercises were reviewed and Rabia was able to demonstrate them prior to the end of the session.  Rabia demonstrated good  understanding of the education provided.     See EMR under Patient Instructions for exercises provided prior  visit.          Assessment   Pt tolerated tx  well w/o c/o neck pn. Pt was able to increase resistance with rotation machine and demonstrate increased ROM. Pt's erick to ther ex in improving.      Patient is making good progress towards established goals.  Pt will continue to benefit from skilled outpatient physical therapy to address the deficits stated in the impairment chart, provide pt/family education and to maximize pt's level of independence in the home and community environment.     Anticipated Barriers for therapy: residual deficits from surgery     Pt's spiritual, cultural and educational needs considered and pt agreeable to plan of care and goals as stated below:         Goals:   Short term goals: 6 weeks or 10 visits   1.  Pt will demonstratte increased cervical ROM as measured by med ex by 3 degrees from initial test which results in improved  ROM of neck for ease with ADLs and driving  2. Pt will demonstrate independence with reducing or controlling symptoms with ther ex, movement, or position independently, able to reduce pain 1-2 points on pain scale using strategies taught in therapy  3. Pt will demonstrate increased maximum isometric torque value by 25% when compared to the initial value resulting in improved ability to perform bending, lifting, and carrying activities safely, confidently.           Long term goals: 13 weeks or 20 visits  1. Pt will demonstratte increased cervical ROM as measured by med ex by 6 degrees from initial test which results in functional ROM of neck for ease with ADLs and driving  2. Pt will demonstrate increased isometric torque by 50% from initial test to improve ability to lift and carry, and sustain good posture while performing ADL's  3.Pt will demonstrate reduced pain and improved functional outcomes as reported on the FOTO by reaching a score of CJ = at least 20% but < 40% impaired, limited or restricted or less in order to demonstrate subjective improvement in pt's  condition.    4. Pt will demonstrate independence with reducing or controlling symptoms with ther ex, movement, or position independently, able to reduce pain 2-4 points on pain scale using strategies taught in therapy  5. Pt will demonstrate independence with the HEP at discharge.   6.  Be able to swing tennis racket without pain.         Plan   Continue with established Plan of Care towards established PT goals.   Outpatient physical therapy 2x week for 10 weeks or 20 visits to include the following:   - Patient education  - Therapeutic exercise  - Manual therapy  - Performance testing   - Neuromuscular Re-education  - Therapeutic activity   - Modalities     Pt may be seen by PTA as part of the rehabilitation team.

## 2019-09-27 ENCOUNTER — CLINICAL SUPPORT (OUTPATIENT)
Dept: REHABILITATION | Facility: HOSPITAL | Age: 54
End: 2019-09-27
Attending: STUDENT IN AN ORGANIZED HEALTH CARE EDUCATION/TRAINING PROGRAM
Payer: COMMERCIAL

## 2019-09-27 DIAGNOSIS — M54.2 CERVICALGIA: ICD-10-CM

## 2019-09-27 PROCEDURE — 97140 MANUAL THERAPY 1/> REGIONS: CPT | Mod: PO | Performed by: PHYSICAL THERAPIST

## 2019-09-27 PROCEDURE — 97110 THERAPEUTIC EXERCISES: CPT | Mod: PO | Performed by: PHYSICAL THERAPIST

## 2019-09-30 RX ORDER — BACLOFEN 10 MG/1
10 TABLET ORAL 2 TIMES DAILY PRN
Qty: 60 TABLET | Refills: 0 | Status: SHIPPED | OUTPATIENT
Start: 2019-09-30 | End: 2019-11-05 | Stop reason: SDUPTHER

## 2019-09-30 NOTE — PROGRESS NOTES
[facility]      Name: Rabia Winn  Clinic Number: [id number]    Therapy Diagnosis:   Encounter Diagnosis   Name Primary?    Cervicalgia      Physician: Jesus Kerr MD    Visit Date: 2019  Physician Orders: Amb referral to [facility]   Medical Diagnosis from Referral: s/p cervical fusion  Evaluation Date: 2019  Authorization Period Expiration: 19  Plan of Care Expiration: 19  Reassessment Due: 10/18/19  Visit # / Visits authorized:      Time In: 359p  Time Out: 459p  Total Billable Time: 50 minutes     Precautions: ACDF 19     Pattern of pain determined: REP      Subjective   Rabia reports she noticed an improvement in pain with needling. States her shoulder/scapula has been bothering her a lot today.     Patient reports tolerating previous visit some R shld and scap soreness  Patient reports their pain to be 3/10 on a 0-10 scale with 0 being no pain and 10 being the worst pain imaginable.  Pain Location: R shld and scap     Work and leisure: Occupation: , lots of sitting  Leisure: working out 7 days, playing tennis 3 days a week        Pt goals: no pain, build my strength back up       Objective     Baseline IM Testing Results:   Date of testin19  ROM 48-75 deg   Max Peak Torque 84    Min Peak Torque 62    Flex/Ext Ratio 1.4:1   % below normative data 61%         Outcomes:  Initial score:Limitation Score: 58%   Visit 5 score:  Goal:      Treatment    Pt was instructed in and performed the following:     Rabia received therapeutic exercises to develop/improved posture, cardiovascular endurance, muscular endurance, lumbar/cervical ROM, strength and muscular endurance for 40 minutes including the following exercises:       [facility] 2019   Visit Number 5   VAS Pain Rating 3   Retraction in Sitting -   Sidebending -   Rotation 20   Manual Therapy 10   Cervical Extension Seat Pad -   Seat Adjustment -    Top Dead Center -   Counterweight -   Cervical Flexion -   Cervical Extension -   Cervical Peak Torque -   Cervical Weight 79   Repetitions 20   Rating of Perceived Exertion 4   Ice - Z Lie (in min.) 10       Peripheral muscle strengthening which included 1 set of 15-20 repetitions at a slow, controlled 10-13 second per rep pace focused on strengthening supporting musculature for improved body mechanics and functional mobility.  Pt and therapist focused on proper form during treatment to ensure optimal strengthening of each targeted muscle group.  Machines were utilized including torso rotation, leg extension, leg curl, chest press, upright row. Tricep extension, bicep curl, leg press, and hip abduction added visit 3    Rabia received the following manual therapy techniques x 10 minutes:      Discussed the purpose, mechanism, and indications for dry needling with Rabia. Patient was cleared of all precautions and contraindications and pt signed written consent and gave verbal consent to dry needling Rx today.   Palpation used to determine dry needling sites. Increased tone noted at R upper trapezius muscle and upper fibers of serratus anterior. Pt rec'd dry needling in prone position to R upper trapezius trigger point, pistoning technique utilized into the trigger point. Pt rec'd dry needling in Prone position with RUE in IR to wing the scapula and to allow access to needle trigger point in the upper fibers of the serratus anterior, left in situ x 3 min with PT by in attendance entire time.   Pt tolerated treatment well and was not in any distress at the completion of treatment.        Home Exercises Provided and Patient Education Provided     Education provided:   - response to therapy  -importance and role of HEP    Written Home Exercises Provided: Patient instructed to cont prior HEP.  Exercises were reviewed and Rabia was able to demonstrate them prior to the end of the session.  Rabia demonstrated good   understanding of the education provided.     See EMR under Patient Instructions for exercises provided prior visit.          Assessment   Pt had no adverse reaction to needling of serratus anterior today, but did not notice much improvement following. She was able to tolerate increased weight on the cervical MedX very well, demonstrating improved motor control with the increased weight.     Patient is making good progress towards established goals.  Pt will continue to benefit from skilled outpatient physical therapy to address the deficits stated in the impairment chart, provide pt/family education and to maximize pt's level of independence in the home and community environment.     Anticipated Barriers for therapy: residual deficits from surgery     Pt's spiritual, cultural and educational needs considered and pt agreeable to plan of care and goals as stated below:         Goals:   Short term goals: 6 weeks or 10 visits   1.  Pt will demonstratte increased cervical ROM as measured by med ex by 3 degrees from initial test which results in improved  ROM of neck for ease with ADLs and driving  2. Pt will demonstrate independence with reducing or controlling symptoms with ther ex, movement, or position independently, able to reduce pain 1-2 points on pain scale using strategies taught in therapy  3. Pt will demonstrate increased maximum isometric torque value by 25% when compared to the initial value resulting in improved ability to perform bending, lifting, and carrying activities safely, confidently.           Long term goals: 13 weeks or 20 visits  1. Pt will demonstratte increased cervical ROM as measured by med ex by 6 degrees from initial test which results in functional ROM of neck for ease with ADLs and driving  2. Pt will demonstrate increased isometric torque by 50% from initial test to improve ability to lift and carry, and sustain good posture while performing ADL's  3.Pt will demonstrate reduced pain and  improved functional outcomes as reported on the FOTO by reaching a score of CJ = at least 20% but < 40% impaired, limited or restricted or less in order to demonstrate subjective improvement in pt's condition.    4. Pt will demonstrate independence with reducing or controlling symptoms with ther ex, movement, or position independently, able to reduce pain 2-4 points on pain scale using strategies taught in therapy  5. Pt will demonstrate independence with the HEP at discharge.   6.  Be able to swing tennis racket without pain.         Plan   Continue with established Plan of Care towards established PT goals.   Outpatient physical therapy 2x week for 10 weeks or 20 visits to include the following:   - Patient education  - Therapeutic exercise  - Manual therapy  - Performance testing   - Neuromuscular Re-education  - Therapeutic activity   - Modalities     Pt may be seen by PTA as part of the rehabilitation team.

## 2019-10-01 ENCOUNTER — CLINICAL SUPPORT (OUTPATIENT)
Dept: REHABILITATION | Facility: HOSPITAL | Age: 54
End: 2019-10-01
Attending: STUDENT IN AN ORGANIZED HEALTH CARE EDUCATION/TRAINING PROGRAM
Payer: COMMERCIAL

## 2019-10-01 DIAGNOSIS — M54.2 CERVICALGIA: ICD-10-CM

## 2019-10-01 PROCEDURE — 97110 THERAPEUTIC EXERCISES: CPT | Mod: PO

## 2019-10-01 NOTE — PROGRESS NOTES
[facility]      Name: Rabia Winn  Clinic Number: [id number]    Therapy Diagnosis:   Encounter Diagnosis   Name Primary?    Cervicalgia      Physician: Jesus Kerr MD    Visit Date: 10/1/2019  Physician Orders: Amb referral to [facility]   Medical Diagnosis from Referral: s/p cervical fusion  Evaluation Date: 2019  Authorization Period Expiration: 19  Plan of Care Expiration: 19  Reassessment Due: 10/18/19  Visit # / Visits authorized:      Time In: 3:30p  Time Out: 4:40p  Total Billable Time: 60 minutes     Precautions: ACDF 19     Pattern of pain determined: REP      Subjective   Rabia reports she needling is helping to reduce her sx. States she has little pn today.     Patient reports tolerating previous visit some R shld and scap soreness  Patient reports their pain to be 3/10 on a 0-10 scale with 0 being no pain and 10 being the worst pain imaginable.  Pain Location: R shld and scap     Work and leisure: Occupation: , lots of sitting  Leisure: working out 7 days, playing tennis 3 days a week        Pt goals: no pain, build my strength back up       Objective     Baseline IM Testing Results:   Date of testin19  ROM 48-75 deg   Max Peak Torque 84    Min Peak Torque 62    Flex/Ext Ratio 1.4:1   % below normative data 61%         Outcomes:  Initial score:Limitation Score: 58%   Visit 5 score:  Goal:      Treatment    Pt was instructed in and performed the following:     Rabia received therapeutic exercises to develop/improved posture, cardiovascular endurance, muscular endurance, lumbar/cervical ROM, strength and muscular endurance for 40 minutes including the following exercises:       [facility] 10/1/2019   Visit Number 6   VAS Pain Rating 3   Cervical Stretches - Retraction In Lying 20   Retraction in Sitting -   Sidebending 3   Rotation 20   Manual Therapy -   Cervical Extension Seat Pad -   Seat  Adjustment -   Top Dead Center -   Counterweight -   Cervical Flexion -   Cervical Extension -   Cervical Peak Torque -   Cervical Weight 78   Repetitions 20   Rating of Perceived Exertion 3   Ice - Z Lie (in min.) 10       Peripheral muscle strengthening which included 1 set of 15-20 repetitions at a slow, controlled 10-13 second per rep pace focused on strengthening supporting musculature for improved body mechanics and functional mobility.  Pt and therapist focused on proper form during treatment to ensure optimal strengthening of each targeted muscle group.  Machines were utilized including torso rotation, leg extension, leg curl, chest press, upright row. Tricep extension, bicep curl, leg press, and hip abduction added visit 3    Rabia received the following manual therapy techniques x 00 minutes:      Discussed the purpose, mechanism, and indications for dry needling with Rabia. Patient was cleared of all precautions and contraindications and pt signed written consent and gave verbal consent to dry needling Rx today.   Palpation used to determine dry needling sites. Increased tone noted at R upper trapezius muscle and upper fibers of serratus anterior. Pt rec'd dry needling in prone position to R upper trapezius trigger point, pistoning technique utilized into the trigger point. Pt rec'd dry needling in Prone position with RUE in IR to wing the scapula and to allow access to needle trigger point in the upper fibers of the serratus anterior, left in situ x 3 min with PT by in attendance entire time.   Pt tolerated treatment well and was not in any distress at the completion of treatment. NP on this date        Home Exercises Provided and Patient Education Provided     Education provided:   - response to therapy  -importance and role of HEP    Written Home Exercises Provided: Patient instructed to cont prior HEP.  Exercises were reviewed and Rabia was able to demonstrate them prior to the end of the session.   Rabia demonstrated good  understanding of the education provided.     See EMR under Patient Instructions for exercises provided prior visit.          Assessment   Pt  was able to tolerate increased reps on the cervical MedX very well, demonstrating improved motor control with the increased weight on peripheral machines.     Patient is making good progress towards established goals.  Pt will continue to benefit from skilled outpatient physical therapy to address the deficits stated in the impairment chart, provide pt/family education and to maximize pt's level of independence in the home and community environment.     Anticipated Barriers for therapy: residual deficits from surgery     Pt's spiritual, cultural and educational needs considered and pt agreeable to plan of care and goals as stated below:         Goals:   Short term goals: 6 weeks or 10 visits   1.  Pt will demonstratte increased cervical ROM as measured by med ex by 3 degrees from initial test which results in improved  ROM of neck for ease with ADLs and driving  2. Pt will demonstrate independence with reducing or controlling symptoms with ther ex, movement, or position independently, able to reduce pain 1-2 points on pain scale using strategies taught in therapy  3. Pt will demonstrate increased maximum isometric torque value by 25% when compared to the initial value resulting in improved ability to perform bending, lifting, and carrying activities safely, confidently.           Long term goals: 13 weeks or 20 visits  1. Pt will demonstratte increased cervical ROM as measured by med ex by 6 degrees from initial test which results in functional ROM of neck for ease with ADLs and driving  2. Pt will demonstrate increased isometric torque by 50% from initial test to improve ability to lift and carry, and sustain good posture while performing ADL's  3.Pt will demonstrate reduced pain and improved functional outcomes as reported on the FOTO by reaching a  score of CJ = at least 20% but < 40% impaired, limited or restricted or less in order to demonstrate subjective improvement in pt's condition.    4. Pt will demonstrate independence with reducing or controlling symptoms with ther ex, movement, or position independently, able to reduce pain 2-4 points on pain scale using strategies taught in therapy  5. Pt will demonstrate independence with the HEP at discharge.   6.  Be able to swing tennis racket without pain.         Plan   Continue with established Plan of Care towards established PT goals.   Outpatient physical therapy 2x week for 10 weeks or 20 visits to include the following:   - Patient education  - Therapeutic exercise  - Manual therapy  - Performance testing   - Neuromuscular Re-education  - Therapeutic activity   - Modalities     Pt may be seen by PTA as part of the rehabilitation team.

## 2019-10-02 DIAGNOSIS — M47.812 SPONDYLOSIS OF CERVICAL REGION WITHOUT MYELOPATHY OR RADICULOPATHY: ICD-10-CM

## 2019-10-02 RX ORDER — CELECOXIB 200 MG/1
200 CAPSULE ORAL DAILY PRN
Qty: 30 CAPSULE | Refills: 1 | Status: SHIPPED | OUTPATIENT
Start: 2019-10-02 | End: 2019-10-25 | Stop reason: SDUPTHER

## 2019-10-03 ENCOUNTER — CLINICAL SUPPORT (OUTPATIENT)
Dept: REHABILITATION | Facility: HOSPITAL | Age: 54
End: 2019-10-03
Attending: STUDENT IN AN ORGANIZED HEALTH CARE EDUCATION/TRAINING PROGRAM
Payer: COMMERCIAL

## 2019-10-03 DIAGNOSIS — M54.2 CERVICALGIA: ICD-10-CM

## 2019-10-03 PROCEDURE — 97140 MANUAL THERAPY 1/> REGIONS: CPT | Mod: PO | Performed by: PHYSICAL THERAPIST

## 2019-10-03 PROCEDURE — 97110 THERAPEUTIC EXERCISES: CPT | Mod: PO | Performed by: PHYSICAL THERAPIST

## 2019-10-03 NOTE — PROGRESS NOTES
[facility]      Name: Rabia Winn  Clinic Number: [id number]    Therapy Diagnosis:   Encounter Diagnosis   Name Primary?    Cervicalgia      Physician: Jesus Kerr MD    Visit Date: 10/3/2019  Physician Orders: Amb referral to [facility]   Medical Diagnosis from Referral: s/p cervical fusion  Evaluation Date: 2019  Authorization Period Expiration: 19  Plan of Care Expiration: 19  Reassessment Due: 10/18/19  Visit # / Visits authorized:      Time In: 100p  Time Out: 150p  Total Billable Time: 50 minutes     Precautions: ACDF 19     Pattern of pain determined: REP      Subjective   Rabia reports she noticed an improvement in pain with needling. States her shoulder/scapula has been bothering her a lot today. Pt inquiring on having needling done 2 days next week to see if this will help her symptoms more.     Patient reports tolerating previous visit some R shld and scap soreness  Patient reports their pain to be 3/10 on a 0-10 scale with 0 being no pain and 10 being the worst pain imaginable.  Pain Location: R shld and scap     Work and leisure: Occupation: , lots of sitting  Leisure: working out 7 days, playing tennis 3 days a week        Pt goals: no pain, build my strength back up       Objective     Baseline IM Testing Results:   Date of testin19  ROM 48-75 deg   Max Peak Torque 84    Min Peak Torque 62    Flex/Ext Ratio 1.4:1   % below normative data 61%         Outcomes:  Initial score:Limitation Score: 58%   Visit 5 score:  Goal:      Treatment    Pt was instructed in and performed the following:     Rabia received therapeutic exercises to develop/improved posture, cardiovascular endurance, muscular endurance, lumbar/cervical ROM, strength and muscular endurance for 40 minutes including the following exercises:     [facility] 10/3/2019   Visit Number 7   VAS Pain Rating 4   Cervical Stretches -  Retraction In Lying -   Retraction in Sitting -   Sidebending -   Rotation 20   Manual Therapy -   Cervical Extension Seat Pad -   Seat Adjustment -   Top Dead Center -   Counterweight -   Cervical Flexion -   Cervical Extension -   Cervical Peak Torque -   Cervical Weight 81   Repetitions 20   Rating of Perceived Exertion 3   Ice - Z Lie (in min.) -           Peripheral muscle strengthening which included 1 set of 15-20 repetitions at a slow, controlled 10-13 second per rep pace focused on strengthening supporting musculature for improved body mechanics and functional mobility.  Pt and therapist focused on proper form during treatment to ensure optimal strengthening of each targeted muscle group.  Machines were utilized including torso rotation, leg extension, leg curl, chest press, upright row. Tricep extension, bicep curl, leg press, and hip abduction added visit 3    Rabia received the following manual therapy techniques x 15 minutes:      Discussed the purpose, mechanism, and indications for dry needling with Rabia. Patient was cleared of all precautions and contraindications and pt signed written consent and gave verbal consent to dry needling Rx today.   Palpation used to determine dry needling sites. Increased tone/trigger points noted at R levator scapula muscle and along the medial scapular border. Pt rec'd dry needling in prone position to R levator scapulae trigger point. Pt rec'd dry needling in Prone position with RUE in IR to wing the scapula and to allow access to needle trigger points along the medial scapular border, PT in attendance throughout dry needling treatment.    Pt tolerated treatment well and was not in any distress at the completion of treatment.        Home Exercises Provided and Patient Education Provided     Education provided:   - response to therapy  -importance and role of HEP    Written Home Exercises Provided: Patient instructed to cont prior HEP.  Exercises were reviewed and  Rabia was able to demonstrate them prior to the end of the session.  Rabia demonstrated good  understanding of the education provided.     See EMR under Patient Instructions for exercises provided prior visit.          Assessment   Patient with immediate pain reduction with dry needling of levator scapula and along medial scapular border. Due to increased pain last session, theraband was utilized for for tricep strengthening and patient tolerated this much better.     Patient is making good progress towards established goals.  Pt will continue to benefit from skilled outpatient physical therapy to address the deficits stated in the impairment chart, provide pt/family education and to maximize pt's level of independence in the home and community environment.     Anticipated Barriers for therapy: residual deficits from surgery     Pt's spiritual, cultural and educational needs considered and pt agreeable to plan of care and goals as stated below:         Goals:   Short term goals: 6 weeks or 10 visits   1.  Pt will demonstratte increased cervical ROM as measured by med ex by 3 degrees from initial test which results in improved  ROM of neck for ease with ADLs and driving  2. Pt will demonstrate independence with reducing or controlling symptoms with ther ex, movement, or position independently, able to reduce pain 1-2 points on pain scale using strategies taught in therapy  3. Pt will demonstrate increased maximum isometric torque value by 25% when compared to the initial value resulting in improved ability to perform bending, lifting, and carrying activities safely, confidently.           Long term goals: 13 weeks or 20 visits  1. Pt will demonstratte increased cervical ROM as measured by med ex by 6 degrees from initial test which results in functional ROM of neck for ease with ADLs and driving  2. Pt will demonstrate increased isometric torque by 50% from initial test to improve ability to lift and carry, and  sustain good posture while performing ADL's  3.Pt will demonstrate reduced pain and improved functional outcomes as reported on the FOTO by reaching a score of CJ = at least 20% but < 40% impaired, limited or restricted or less in order to demonstrate subjective improvement in pt's condition.    4. Pt will demonstrate independence with reducing or controlling symptoms with ther ex, movement, or position independently, able to reduce pain 2-4 points on pain scale using strategies taught in therapy  5. Pt will demonstrate independence with the HEP at discharge.   6.  Be able to swing tennis racket without pain.         Plan   Continue with established Plan of Care towards established PT goals.   Outpatient physical therapy 2x week for 10 weeks or 20 visits to include the following:   - Patient education  - Therapeutic exercise  - Manual therapy  - Performance testing   - Neuromuscular Re-education  - Therapeutic activity   - Modalities     Pt may be seen by PTA as part of the rehabilitation team.

## 2019-10-04 ENCOUNTER — OFFICE VISIT (OUTPATIENT)
Dept: FAMILY MEDICINE | Facility: CLINIC | Age: 54
End: 2019-10-04
Payer: COMMERCIAL

## 2019-10-04 VITALS
WEIGHT: 103.38 LBS | HEART RATE: 72 BPM | BODY MASS INDEX: 19.52 KG/M2 | HEIGHT: 61 IN | DIASTOLIC BLOOD PRESSURE: 80 MMHG | OXYGEN SATURATION: 98 % | SYSTOLIC BLOOD PRESSURE: 138 MMHG

## 2019-10-04 DIAGNOSIS — I10 ESSENTIAL HYPERTENSION: Primary | ICD-10-CM

## 2019-10-04 DIAGNOSIS — Z12.11 COLON CANCER SCREENING: ICD-10-CM

## 2019-10-04 PROCEDURE — 99999 PR PBB SHADOW E&M-EST. PATIENT-LVL III: ICD-10-PCS | Mod: PBBFAC,,, | Performed by: FAMILY MEDICINE

## 2019-10-04 PROCEDURE — 99214 PR OFFICE/OUTPT VISIT, EST, LEVL IV, 30-39 MIN: ICD-10-PCS | Mod: S$GLB,,, | Performed by: FAMILY MEDICINE

## 2019-10-04 PROCEDURE — 99999 PR PBB SHADOW E&M-EST. PATIENT-LVL III: CPT | Mod: PBBFAC,,, | Performed by: FAMILY MEDICINE

## 2019-10-04 PROCEDURE — 99214 OFFICE O/P EST MOD 30 MIN: CPT | Mod: S$GLB,,, | Performed by: FAMILY MEDICINE

## 2019-10-04 NOTE — PROGRESS NOTES
"Subjective:       Patient ID: Rabia Winn is a 53 y.o. female.    Chief Complaint: Hypertension    Pt is known to me. The pt is concerned about her BP--she had a wrist monitor at home and does not feel it is accurate.  She also feels that her feet swell at night.  She takes Celebrex only about twice a week.  She is still recovering after her neck surgery and is trying to regain some weight.    Review of Systems   Constitutional: Negative for activity change, appetite change, fatigue and unexpected weight change.   Eyes: Negative for visual disturbance.   Respiratory: Negative for cough, chest tightness and shortness of breath.    Cardiovascular: Positive for leg swelling. Negative for chest pain and palpitations.   Gastrointestinal: Negative for abdominal pain, constipation, diarrhea, nausea and vomiting.   Endocrine: Negative for cold intolerance, heat intolerance and polyuria.   Genitourinary: Negative for decreased urine volume and dysuria.   Musculoskeletal: Negative for arthralgias and back pain.   Skin: Negative for rash.   Neurological: Negative for numbness and headaches.       Objective:       Vitals:    10/04/19 1352   BP: 138/80   BP Location: Right arm   Patient Position: Sitting   BP Method: Medium (Manual)   Pulse: 72   SpO2: 98%   Weight: 46.9 kg (103 lb 6.3 oz)   Height: 5' 1" (1.549 m)     Physical Exam   Constitutional: She is oriented to person, place, and time. She appears well-developed and well-nourished.   HENT:   Head: Normocephalic.   Eyes: Pupils are equal, round, and reactive to light. Conjunctivae and EOM are normal.   Neck: Normal range of motion. Neck supple. No thyromegaly present.   Cardiovascular: Normal rate, regular rhythm and normal heart sounds.   Pulmonary/Chest: Effort normal and breath sounds normal.   Abdominal: Soft. Bowel sounds are normal. There is no tenderness.   Musculoskeletal: Normal range of motion. She exhibits no tenderness or deformity.   Lymphadenopathy: "     She has no cervical adenopathy.   Neurological: She is alert and oriented to person, place, and time. She displays normal reflexes. No cranial nerve deficit. She exhibits normal muscle tone. Coordination normal.   Skin: Skin is warm and dry.   Psychiatric: She has a normal mood and affect. Her behavior is normal.       Assessment:       1. Essential hypertension    2. Colon cancer screening        Plan:       Rabia was seen today for hypertension.    Diagnoses and all orders for this visit:    Essential hypertension    Colon cancer screening  -     Fecal Immunochemical Test (iFOBT); Future      During this visit, I reviewed the pt's history, medications, allergies, and problem list.    Pt ot get Amron 7 home BP monitor and send me some random readings

## 2019-10-07 ENCOUNTER — CLINICAL SUPPORT (OUTPATIENT)
Dept: REHABILITATION | Facility: HOSPITAL | Age: 54
End: 2019-10-07
Attending: STUDENT IN AN ORGANIZED HEALTH CARE EDUCATION/TRAINING PROGRAM
Payer: COMMERCIAL

## 2019-10-07 DIAGNOSIS — M54.2 CERVICALGIA: ICD-10-CM

## 2019-10-07 DIAGNOSIS — F51.01 PRIMARY INSOMNIA: ICD-10-CM

## 2019-10-07 PROCEDURE — 97110 THERAPEUTIC EXERCISES: CPT | Mod: PO | Performed by: PHYSICAL THERAPIST

## 2019-10-07 PROCEDURE — 97140 MANUAL THERAPY 1/> REGIONS: CPT | Mod: PO | Performed by: PHYSICAL THERAPIST

## 2019-10-07 NOTE — PROGRESS NOTES
[facility]      Name: Rabia Winn  Clinic Number: [id number]    Therapy Diagnosis:   Encounter Diagnosis   Name Primary?    Cervicalgia      Physician: Jesus Kerr MD    Visit Date: 10/7/2019  Physician Orders: Amb referral to [facility]   Medical Diagnosis from Referral: s/p cervical fusion  Evaluation Date: 2019  Authorization Period Expiration: 19  Plan of Care Expiration: 19  Reassessment Due: 10/18/19  Visit # / Visits authorized:      Time In: 201p  Time Out: 305p  Total Billable Time: 54 minutes     Precautions: ACDF 19     Pattern of pain determined: REP      Subjective   Rabia reports she had a significant reduction in pain with last dry needling techniques to the medial scapular border and Levator scapula.     Patient reports tolerating previous visit some R shld and scap soreness  Patient reports their pain to be 3/10 on a 0-10 scale with 0 being no pain and 10 being the worst pain imaginable.  Pain Location: R shld and scap     Work and leisure: Occupation: , lots of sitting  Leisure: working out 7 days, playing tennis 3 days a week        Pt goals: no pain, build my strength back up       Objective     Baseline IM Testing Results:   Date of testin19  ROM 48-75 deg   Max Peak Torque 84    Min Peak Torque 62    Flex/Ext Ratio 1.4:1   % below normative data 61%         Outcomes:  Initial score:Limitation Score: 58%   Visit 5 score:  Goal:      Treatment    Pt was instructed in and performed the following:     Rabia received therapeutic exercises to develop/improved posture, cardiovascular endurance, muscular endurance, lumbar/cervical ROM, strength and muscular endurance for 40 minutes including the following exercises:     Levator scapulae stretch, 3x30s  R scapular setting in prone, 15x5s hold            Peripheral muscle strengthening which included 1 set of 15-20 repetitions at a slow, controlled 10-13  second per rep pace focused on strengthening supporting musculature for improved body mechanics and functional mobility.  Pt and therapist focused on proper form during treatment to ensure optimal strengthening of each targeted muscle group.  Machines were utilized including torso rotation, leg extension, leg curl, chest press, upright row. Tricep extension, bicep curl, leg press, and hip abduction added visit 3    Rabia received the following manual therapy techniques x 15 minutes:      Discussed the purpose, mechanism, and indications for dry needling with Rbaia. Patient was cleared of all precautions and contraindications and pt signed written consent and gave verbal consent to dry needling Rx today.   Palpation used to determine dry needling sites. Increased tone/trigger points noted at R levator scapula muscle and along the medial scapular border. Pt rec'd dry needling in prone position to R levator scapulae trigger point. Pt rec'd dry needling in Prone position with RUE in IR to wing the scapula and to allow access to needle trigger points along the medial scapular border, PT in attendance throughout dry needling treatment.    Pt tolerated treatment well and was not in any distress at the completion of treatment.        Home Exercises Provided and Patient Education Provided     Education provided:   - response to therapy  -importance and role of HEP    Written Home Exercises Provided: Patient instructed to cont prior HEP.  Exercises were reviewed and Rabia was able to demonstrate them prior to the end of the session.  Rabia demonstrated good  understanding of the education provided.     See EMR under Patient Instructions for exercises provided prior visit.          Assessment   Scapular winging persists, however pt is able to reduce wing with scapular setting.      Patient is making good progress towards established goals.  Pt will continue to benefit from skilled outpatient physical therapy to address the  deficits stated in the impairment chart, provide pt/family education and to maximize pt's level of independence in the home and community environment.     Anticipated Barriers for therapy: residual deficits from surgery     Pt's spiritual, cultural and educational needs considered and pt agreeable to plan of care and goals as stated below:         Goals:   Short term goals: 6 weeks or 10 visits   1.  Pt will demonstratte increased cervical ROM as measured by med ex by 3 degrees from initial test which results in improved  ROM of neck for ease with ADLs and driving  2. Pt will demonstrate independence with reducing or controlling symptoms with ther ex, movement, or position independently, able to reduce pain 1-2 points on pain scale using strategies taught in therapy  3. Pt will demonstrate increased maximum isometric torque value by 25% when compared to the initial value resulting in improved ability to perform bending, lifting, and carrying activities safely, confidently.           Long term goals: 13 weeks or 20 visits  1. Pt will demonstratte increased cervical ROM as measured by med ex by 6 degrees from initial test which results in functional ROM of neck for ease with ADLs and driving  2. Pt will demonstrate increased isometric torque by 50% from initial test to improve ability to lift and carry, and sustain good posture while performing ADL's  3.Pt will demonstrate reduced pain and improved functional outcomes as reported on the FOTO by reaching a score of CJ = at least 20% but < 40% impaired, limited or restricted or less in order to demonstrate subjective improvement in pt's condition.    4. Pt will demonstrate independence with reducing or controlling symptoms with ther ex, movement, or position independently, able to reduce pain 2-4 points on pain scale using strategies taught in therapy  5. Pt will demonstrate independence with the HEP at discharge.   6.  Be able to swing tennis racket without pain.          Plan   Continue with established Plan of Care towards established PT goals.   Outpatient physical therapy 2x week for 10 weeks or 20 visits to include the following:   - Patient education  - Therapeutic exercise  - Manual therapy  - Performance testing   - Neuromuscular Re-education  - Therapeutic activity   - Modalities     Pt may be seen by PTA as part of the rehabilitation team.

## 2019-10-08 RX ORDER — ZOLPIDEM TARTRATE 5 MG/1
5 TABLET ORAL NIGHTLY PRN
Qty: 30 TABLET | Refills: 2 | Status: SHIPPED | OUTPATIENT
Start: 2019-10-08 | End: 2020-02-12

## 2019-10-12 ENCOUNTER — LAB VISIT (OUTPATIENT)
Dept: LAB | Facility: HOSPITAL | Age: 54
End: 2019-10-12
Attending: FAMILY MEDICINE
Payer: COMMERCIAL

## 2019-10-12 DIAGNOSIS — Z12.11 COLON CANCER SCREENING: ICD-10-CM

## 2019-10-12 PROCEDURE — 82274 ASSAY TEST FOR BLOOD FECAL: CPT

## 2019-10-17 ENCOUNTER — CLINICAL SUPPORT (OUTPATIENT)
Dept: REHABILITATION | Facility: HOSPITAL | Age: 54
End: 2019-10-17
Attending: STUDENT IN AN ORGANIZED HEALTH CARE EDUCATION/TRAINING PROGRAM
Payer: COMMERCIAL

## 2019-10-17 DIAGNOSIS — M54.2 CERVICALGIA: ICD-10-CM

## 2019-10-17 LAB — HEMOCCULT STL QL IA: NEGATIVE

## 2019-10-17 PROCEDURE — 97140 MANUAL THERAPY 1/> REGIONS: CPT | Mod: PO | Performed by: PHYSICAL THERAPIST

## 2019-10-17 PROCEDURE — 97110 THERAPEUTIC EXERCISES: CPT | Mod: PO | Performed by: PHYSICAL THERAPIST

## 2019-10-17 NOTE — PROGRESS NOTES
[facility]      Name: Rabia Winn  Clinic Number: [id number]    Therapy Diagnosis:   Encounter Diagnosis   Name Primary?    Cervicalgia      Physician: Jesus Kerr MD    Visit Date: 10/17/2019  Physician Orders: Amb referral to [facility]   Medical Diagnosis from Referral: s/p cervical fusion  Evaluation Date: 2019  Authorization Period Expiration: 19  Plan of Care Expiration: 19  Reassessment Due: 10/18/19  Visit # / Visits authorized:      Time In: 402  Time Out: 447p  Total Billable Time: 45 minutes     Precautions: ACDF 19     Pattern of pain determined: REP      Subjective   Rabia reports that she feels like the dry needling has been helpful.    Patient reports tolerating previous visit some R shld and scap soreness  Patient reports their pain to be 5/10 on a 0-10 scale with 0 being no pain and 10 being the worst pain imaginable.  Pain Location: R shld and scap     Work and leisure: Occupation: , lots of sitting  Leisure: working out 7 days, playing tennis 3 days a week        Pt goals: no pain, build my strength back up       Objective     Baseline IM Testing Results:   Date of testin19  ROM 48-75 deg   Max Peak Torque 84    Min Peak Torque 62    Flex/Ext Ratio 1.4:1   % below normative data 61%         Outcomes:  Initial score:Limitation Score: 58%   Visit 5 score:  Goal:      Treatment    Pt was instructed in and performed the following:     Rabia received therapeutic exercises to develop/improved posture, cardiovascular endurance, muscular endurance, lumbar/cervical ROM, strength and muscular endurance for 40 minutes including the following exercises:     Levator scapulae stretch, 3x30s  R scapular setting in prone, 15x5s hold      Peripheral muscle strengthening which included 1 set of 15-20 repetitions at a slow, controlled 10-13 second per rep pace focused on strengthening supporting musculature for  improved body mechanics and functional mobility.  Pt and therapist focused on proper form during treatment to ensure optimal strengthening of each targeted muscle group.  Machines were utilized including torso rotation, leg extension, leg curl, chest press, upright row. Tricep extension, bicep curl, leg press, and hip abduction added visit 3    Rabia received the following manual therapy techniques x 15 minutes:      Discussed the purpose, mechanism, and indications for dry needling with Rabia. Patient was cleared of all precautions and contraindications and pt signed written consent and gave verbal consent to dry needling Rx today.   Palpation used to determine dry needling sites. Increased tone/trigger points noted at R levator scapula, upper trapezius muscle and along the medial scapular border. Pt rec'd dry needling in prone position to R levator scapulae, upper trapezius, T5,6,7.. Pt rec'd dry needling in Prone position with RUE in IR to wing the scapula and to allow access to needle trigger points along the medial scapular border, PT in attendance throughout dry needling treatment.    Pt tolerated treatment well and was not in any distress at the completion of treatment.        Home Exercises Provided and Patient Education Provided     Education provided:   - response to therapy  -importance and role of HEP    Written Home Exercises Provided: Patient instructed to cont prior HEP.  Exercises were reviewed and Rabia was able to demonstrate them prior to the end of the session.  Rabia demonstrated good  understanding of the education provided.     See EMR under Patient Instructions for exercises provided prior visit.          Assessment   Positive response to dry needling, with reduced pain following.     Patient is making good progress towards established goals.  Pt will continue to benefit from skilled outpatient physical therapy to address the deficits stated in the impairment chart, provide pt/family  education and to maximize pt's level of independence in the home and community environment.     Anticipated Barriers for therapy: residual deficits from surgery     Pt's spiritual, cultural and educational needs considered and pt agreeable to plan of care and goals as stated below:         Goals:   Short term goals: 6 weeks or 10 visits   1.  Pt will demonstratte increased cervical ROM as measured by med ex by 3 degrees from initial test which results in improved  ROM of neck for ease with ADLs and driving  2. Pt will demonstrate independence with reducing or controlling symptoms with ther ex, movement, or position independently, able to reduce pain 1-2 points on pain scale using strategies taught in therapy  3. Pt will demonstrate increased maximum isometric torque value by 25% when compared to the initial value resulting in improved ability to perform bending, lifting, and carrying activities safely, confidently.           Long term goals: 13 weeks or 20 visits  1. Pt will demonstratte increased cervical ROM as measured by med ex by 6 degrees from initial test which results in functional ROM of neck for ease with ADLs and driving  2. Pt will demonstrate increased isometric torque by 50% from initial test to improve ability to lift and carry, and sustain good posture while performing ADL's  3.Pt will demonstrate reduced pain and improved functional outcomes as reported on the FOTO by reaching a score of CJ = at least 20% but < 40% impaired, limited or restricted or less in order to demonstrate subjective improvement in pt's condition.    4. Pt will demonstrate independence with reducing or controlling symptoms with ther ex, movement, or position independently, able to reduce pain 2-4 points on pain scale using strategies taught in therapy  5. Pt will demonstrate independence with the HEP at discharge.   6.  Be able to swing tennis racket without pain.         Plan   Continue with established Plan of Care towards  established PT goals.   Outpatient physical therapy 2x week for 10 weeks or 20 visits to include the following:   - Patient education  - Therapeutic exercise  - Manual therapy  - Performance testing   - Neuromuscular Re-education  - Therapeutic activity   - Modalities     Pt may be seen by PTA as part of the rehabilitation team.

## 2019-10-22 ENCOUNTER — CLINICAL SUPPORT (OUTPATIENT)
Dept: REHABILITATION | Facility: HOSPITAL | Age: 54
End: 2019-10-22
Attending: STUDENT IN AN ORGANIZED HEALTH CARE EDUCATION/TRAINING PROGRAM
Payer: COMMERCIAL

## 2019-10-22 DIAGNOSIS — M54.2 CERVICALGIA: Primary | ICD-10-CM

## 2019-10-22 PROCEDURE — 97110 THERAPEUTIC EXERCISES: CPT | Mod: PO

## 2019-10-22 NOTE — PROGRESS NOTES
[facility]      Name: Rabia Winn  Clinic Number: [id number]    Therapy Diagnosis:   No diagnosis found.  Physician: Jesus Kerr MD    Visit Date: 10/22/2019  Physician Orders: Amb referral to [facility]   Medical Diagnosis from Referral: s/p cervical fusion  Evaluation Date: 2019  Authorization Period Expiration: 19  Plan of Care Expiration: 19  Reassessment Due: 10/18/19  Visit # / Visits authorized:      Time In: 403  Time Out: 5:50p  Total Billable Time: 45 minutes     Precautions: ACDF 19     Pattern of pain determined: REP      Subjective   Rabia reports that she is feeling better and feels that therapy is helping.    Patient reports tolerating previous visit some R shld and scap soreness  Patient reports their pain to be 310 on a 0-10 scale with 0 being no pain and 10 being the worst pain imaginable.  Pain Location: R shld and scap     Work and leisure: Occupation: , lots of sitting  Leisure: working out 7 days, playing tennis 3 days a week        Pt goals: no pain, build my strength back up       Objective     Baseline IM Testing Results:   Date of testin19  ROM 48-75 deg   Max Peak Torque 84    Min Peak Torque 62    Flex/Ext Ratio 1.4:1   % below normative data 61%         Outcomes:  Initial score:Limitation Score: 58%   Visit 5 score:  Goal:      Treatment    Pt was instructed in and performed the following:     Rabia received therapeutic exercises to develop/improved posture, cardiovascular endurance, muscular endurance, lumbar/cervical ROM, strength and muscular endurance for 40 minutes including the following exercises:     Levator scapulae stretch, 3x30s  R scapular setting in prone, 15x5s hold      Peripheral muscle strengthening which included 1 set of 15-20 repetitions at a slow, controlled 10-13 second per rep pace focused on strengthening supporting musculature for improved body mechanics and  functional mobility.  Pt and therapist focused on proper form during treatment to ensure optimal strengthening of each targeted muscle group.  Machines were utilized including torso rotation, leg extension, leg curl, chest press, upright row. Tricep extension, bicep curl, leg press, and hip abduction added visit 3    Rabia received the following manual therapy techniques x 15 minutes:      Discussed the purpose, mechanism, and indications for dry needling with Rabia. Patient was cleared of all precautions and contraindications and pt signed written consent and gave verbal consent to dry needling Rx today.   Palpation used to determine dry needling sites. Increased tone/trigger points noted at R levator scapula, upper trapezius muscle and along the medial scapular border. Pt rec'd dry needling in prone position to R levator scapulae, upper trapezius, T5,6,7.. Pt rec'd dry needling in Prone position with RUE in IR to wing the scapula and to allow access to needle trigger points along the medial scapular border, PT in attendance throughout dry needling treatment.    Pt tolerated treatment well and was not in any distress at the completion of treatment.        Home Exercises Provided and Patient Education Provided     Education provided:   - response to therapy  -importance and role of HEP    Written Home Exercises Provided: Patient instructed to cont prior HEP.  Exercises were reviewed and Rabia was able to demonstrate them prior to the end of the session.  Rabia demonstrated good  understanding of the education provided.     See EMR under Patient Instructions for exercises provided prior visit.          Assessment   Vanessa erick tx with progression of resistance with  peripheral  machines with min to no change in PE. Pt did have some difficulty with R UE bicep curl vs L. Pt was able to complete ex program w/o increase in pn level.     Patient is making good progress towards established goals.  Pt will continue to  benefit from skilled outpatient physical therapy to address the deficits stated in the impairment chart, provide pt/family education and to maximize pt's level of independence in the home and community environment.     Anticipated Barriers for therapy: residual deficits from surgery     Pt's spiritual, cultural and educational needs considered and pt agreeable to plan of care and goals as stated below:         Goals:   Short term goals: 6 weeks or 10 visits   1.  Pt will demonstratte increased cervical ROM as measured by med ex by 3 degrees from initial test which results in improved  ROM of neck for ease with ADLs and driving  2. Pt will demonstrate independence with reducing or controlling symptoms with ther ex, movement, or position independently, able to reduce pain 1-2 points on pain scale using strategies taught in therapy  3. Pt will demonstrate increased maximum isometric torque value by 25% when compared to the initial value resulting in improved ability to perform bending, lifting, and carrying activities safely, confidently.           Long term goals: 13 weeks or 20 visits  1. Pt will demonstratte increased cervical ROM as measured by med ex by 6 degrees from initial test which results in functional ROM of neck for ease with ADLs and driving  2. Pt will demonstrate increased isometric torque by 50% from initial test to improve ability to lift and carry, and sustain good posture while performing ADL's  3.Pt will demonstrate reduced pain and improved functional outcomes as reported on the FOTO by reaching a score of CJ = at least 20% but < 40% impaired, limited or restricted or less in order to demonstrate subjective improvement in pt's condition.    4. Pt will demonstrate independence with reducing or controlling symptoms with ther ex, movement, or position independently, able to reduce pain 2-4 points on pain scale using strategies taught in therapy  5. Pt will demonstrate independence with the [facility] at  discharge.   6.  Be able to swing tennis racket without pain.         Plan   Continue with established Plan of Care towards established PT goals.   Outpatient physical therapy 2x week for 10 weeks or 20 visits to include the following:   - Patient education  - Therapeutic exercise  - Manual therapy  - Performance testing   - Neuromuscular Re-education  - Therapeutic activity   - Modalities     Pt may be seen by PTA as part of the rehabilitation team.

## 2019-10-24 ENCOUNTER — CLINICAL SUPPORT (OUTPATIENT)
Dept: REHABILITATION | Facility: HOSPITAL | Age: 54
End: 2019-10-24
Attending: STUDENT IN AN ORGANIZED HEALTH CARE EDUCATION/TRAINING PROGRAM
Payer: COMMERCIAL

## 2019-10-24 DIAGNOSIS — M54.2 CERVICALGIA: ICD-10-CM

## 2019-10-24 PROCEDURE — 97110 THERAPEUTIC EXERCISES: CPT | Mod: PO | Performed by: PHYSICAL THERAPIST

## 2019-10-24 PROCEDURE — 97140 MANUAL THERAPY 1/> REGIONS: CPT | Mod: PO | Performed by: PHYSICAL THERAPIST

## 2019-10-24 NOTE — PROGRESS NOTES
[facility]      Name: Rabia Winn  Clinic Number: [id number]    Therapy Diagnosis:   Encounter Diagnosis   Name Primary?    Cervicalgia      Physician: Jesus Kerr MD    Visit Date: 10/24/2019  Physician Orders: Amb referral to [facility]   Medical Diagnosis from Referral: s/p cervical fusion  Evaluation Date: 2019  Authorization Period Expiration: 19  Plan of Care Expiration: 19  Reassessment Due: 10/18/19  Visit # / Visits authorized:      Time In: 403  Time Out: 455p  Total Billable Time: 52 minutes     Precautions: ACDF 19     Pattern of pain determined: REP      Subjective   Rabia reports that she is having a lot of burning in her neck and shoulder today.     Patient reports tolerating previous visit some R shld and scap soreness  Patient reports their pain to be 3/10 on a 0-10 scale with 0 being no pain and 10 being the worst pain imaginable.  Pain Location: R shld and scap     Work and leisure: Occupation: , lots of sitting  Leisure: working out 7 days, playing tennis 3 days a week        Pt goals: no pain, build my strength back up       Objective     Baseline IM Testing Results:   Date of testin19  ROM 48-75 deg   Max Peak Torque 84    Min Peak Torque 62    Flex/Ext Ratio 1.4:1   % below normative data 61%         Outcomes:  Initial score:Limitation Score: 58%   Visit 5 score:  Goal:      Treatment    Pt was instructed in and performed the following:     Rabia received therapeutic exercises to develop/improved posture, cardiovascular endurance, muscular endurance, lumbar/cervical ROM, strength and muscular endurance for 40 minutes including the following exercises:     Levator scapulae stretch, 3x30s  R scapular setting in prone, 15x5s hold    [facility] 10/24/2019   Visit Number 11   VAS Pain Rating 4   Cervical Stretches - Retraction In Lying -   Retraction in Sitting -   Sidebending -   Rotation  -   Manual Therapy 10   Cervical Extension Seat Pad 2   Seat Adjustment 399   Top Dead Center 66   Counterweight 0.1   Cervical Flexion 75   Cervical Extension 48   Cervical Peak Torque -   Cervical Weight 90   Repetitions 20   Rating of Perceived Exertion 2   Ice - Z Lie (in min.) -         Peripheral muscle strengthening which included 1 set of 15-20 repetitions at a slow, controlled 10-13 second per rep pace focused on strengthening supporting musculature for improved body mechanics and functional mobility.  Pt and therapist focused on proper form during treatment to ensure optimal strengthening of each targeted muscle group.  Machines were utilized including torso rotation, leg extension, leg curl, chest press, upright row. Tricep extension, bicep curl, leg press, and hip abduction added visit 3    Rabia received the following manual therapy techniques x 15 minutes:      Discussed the purpose, mechanism, and indications for dry needling with Rabia. Patient was cleared of all precautions and contraindications and pt signed written consent and gave verbal consent to dry needling Rx today.   Palpation used to determine dry needling sites. Increased tone/trigger points noted at R levator scapula, upper trapezius muscle and along the medial scapular border. Pt rec'd dry needling in prone position to R levator scapulae, upper trapezius, C4,5,6.. Pt rec'd dry needling in Prone position with RUE in IR to wing the scapula and to allow access to needle trigger points along the medial scapular border, PT in attendance throughout dry needling treatment.    Pt tolerated treatment well and was not in any distress at the completion of treatment.        Home Exercises Provided and Patient Education Provided     Education provided:   - response to therapy  -importance and role of HEP    Written Home Exercises Provided: Patient instructed to cont prior HEP.  Exercises were reviewed and Rabia was able to demonstrate them prior  to the end of the session.  Rabia demonstrated good  understanding of the education provided.     See EMR under Patient Instructions for exercises provided prior visit.          Assessment   Vanessa with good tolerance to Med x exercises and will be appropriate for progression during her next treatment visit. She continues to have varying amounts of pain in the right cervical/scapular region. Decreased pain following dry needling.     Patient is making good progress towards established goals.  Pt will continue to benefit from skilled outpatient physical therapy to address the deficits stated in the impairment chart, provide pt/family education and to maximize pt's level of independence in the home and community environment.     Anticipated Barriers for therapy: residual deficits from surgery     Pt's spiritual, cultural and educational needs considered and pt agreeable to plan of care and goals as stated below:         Goals:   Short term goals: 6 weeks or 10 visits   1.  Pt will demonstratte increased cervical ROM as measured by med ex by 3 degrees from initial test which results in improved  ROM of neck for ease with ADLs and driving  2. Pt will demonstrate independence with reducing or controlling symptoms with ther ex, movement, or position independently, able to reduce pain 1-2 points on pain scale using strategies taught in therapy  3. Pt will demonstrate increased maximum isometric torque value by 25% when compared to the initial value resulting in improved ability to perform bending, lifting, and carrying activities safely, confidently.           Long term goals: 13 weeks or 20 visits  1. Pt will demonstratte increased cervical ROM as measured by med ex by 6 degrees from initial test which results in functional ROM of neck for ease with ADLs and driving  2. Pt will demonstrate increased isometric torque by 50% from initial test to improve ability to lift and carry, and sustain good posture while performing  ADL's  3.Pt will demonstrate reduced pain and improved functional outcomes as reported on the FOTO by reaching a score of CJ = at least 20% but < 40% impaired, limited or restricted or less in order to demonstrate subjective improvement in pt's condition.    4. Pt will demonstrate independence with reducing or controlling symptoms with ther ex, movement, or position independently, able to reduce pain 2-4 points on pain scale using strategies taught in therapy  5. Pt will demonstrate independence with the HEP at discharge.   6.  Be able to swing tennis racket without pain.         Plan   Continue with established Plan of Care towards established PT goals.   Outpatient physical therapy 2x week for 10 weeks or 20 visits to include the following:   - Patient education  - Therapeutic exercise  - Manual therapy  - Performance testing   - Neuromuscular Re-education  - Therapeutic activity   - Modalities     Pt may be seen by PTA as part of the rehabilitation team.

## 2019-10-25 DIAGNOSIS — M47.812 SPONDYLOSIS OF CERVICAL REGION WITHOUT MYELOPATHY OR RADICULOPATHY: ICD-10-CM

## 2019-10-25 RX ORDER — CELECOXIB 200 MG/1
200 CAPSULE ORAL DAILY PRN
Qty: 30 CAPSULE | Refills: 1 | Status: SHIPPED | OUTPATIENT
Start: 2019-10-25 | End: 2019-11-18 | Stop reason: SDUPTHER

## 2019-10-30 ENCOUNTER — HOSPITAL ENCOUNTER (OUTPATIENT)
Dept: RADIOLOGY | Facility: HOSPITAL | Age: 54
Discharge: HOME | End: 2019-10-30
Attending: NURSE PRACTITIONER
Payer: COMMERCIAL

## 2019-10-30 DIAGNOSIS — Z12.31 BREAST CANCER SCREENING BY MAMMOGRAM: ICD-10-CM

## 2019-10-30 DIAGNOSIS — Z00.00 PREVENTATIVE HEALTH CARE: ICD-10-CM

## 2019-10-30 PROCEDURE — 77067 SCR MAMMO BI INCL CAD: CPT | Mod: TC,PO

## 2019-10-30 PROCEDURE — 77063 MAMMO DIGITAL SCREENING BILAT WITH TOMOSYNTHESIS_CAD: ICD-10-PCS | Mod: 26,,, | Performed by: RADIOLOGY

## 2019-10-30 PROCEDURE — 77063 BREAST TOMOSYNTHESIS BI: CPT | Mod: 26,,, | Performed by: RADIOLOGY

## 2019-10-30 PROCEDURE — 77067 MAMMO DIGITAL SCREENING BILAT WITH TOMOSYNTHESIS_CAD: ICD-10-PCS | Mod: 26,,, | Performed by: RADIOLOGY

## 2019-10-30 PROCEDURE — 77067 SCR MAMMO BI INCL CAD: CPT | Mod: 26,,, | Performed by: RADIOLOGY

## 2019-11-05 ENCOUNTER — CLINICAL SUPPORT (OUTPATIENT)
Dept: REHABILITATION | Facility: HOSPITAL | Age: 54
End: 2019-11-05
Attending: STUDENT IN AN ORGANIZED HEALTH CARE EDUCATION/TRAINING PROGRAM
Payer: COMMERCIAL

## 2019-11-05 DIAGNOSIS — M54.2 CERVICALGIA: ICD-10-CM

## 2019-11-05 PROCEDURE — 97110 THERAPEUTIC EXERCISES: CPT | Mod: PO

## 2019-11-05 RX ORDER — BACLOFEN 10 MG/1
10 TABLET ORAL 2 TIMES DAILY PRN
Qty: 60 TABLET | Refills: 0 | Status: SHIPPED | OUTPATIENT
Start: 2019-11-05 | End: 2019-12-09 | Stop reason: SDUPTHER

## 2019-11-05 NOTE — PROGRESS NOTES
[facility] Treatment      Name: Rabia Winn  Clinic Number: [id number]    Therapy Diagnosis:   Encounter Diagnosis   Name Primary?    Cervicalgia      Physician: Jesus Kerr MD    Visit Date: 2019  Physician Orders: Amb referral to [facility]   Medical Diagnosis from Referral: s/p cervical fusion  Evaluation Date: 2019  Authorization Period Expiration: 19  Plan of Care Expiration: 19  Reassessment Due: 10/18/19  Visit # / Visits authorized:      Time In: 203  Time Out: 3:00p  Total Billable Time: 57 minutes     Precautions: ACDF 19     Pattern of pain determined: REP      Subjective   Rabia reports that she is  feeling better overall and is w/o complaint of pn today.     Patient reports tolerating previous visit some R shld and scap soreness  Patient reports their pain to be 0/10 on a 0-10 scale with 0 being no pain and 10 being the worst pain imaginable.  Pain Location: R shld and scap     Work and leisure: Occupation: , lots of sitting  Leisure: working out 7 days, playing tennis 3 days a week        Pt goals: no pain, build my strength back up       Objective     Baseline IM Testing Results:   Date of testin19  ROM 48-75 deg   Max Peak Torque 84    Min Peak Torque 62    Flex/Ext Ratio 1.4:1   % below normative data 61%         Outcomes:  Initial score:Limitation Score: 58%   Visit 5 score:  Goal:      Treatment    Pt was instructed in and performed the following:     Rabia received therapeutic exercises to develop/improved posture, cardiovascular endurance, muscular endurance, lumbar/cervical ROM, strength and muscular endurance for 40 minutes including the following exercises:     Levator scapulae stretch, 3x30s  R scapular setting in prone, 20x5s hold  Scap retraction 20x 5 sec hold       [facility] 2019   Visit Number 12   VAS Pain Rating 0   Cervical Stretches - Retraction In Lying -   Retraction in  Sitting -   Sidebending -   Rotation -   Manual Therapy 0   Cervical Extension Seat Pad -   Seat Adjustment -   Top Dead Center -   Counterweight -   Cervical Flexion -   Cervical Extension -   Cervical Peak Torque -   Cervical Weight 93   Repetitions 15   Rating of Perceived Exertion 2   Ice - Z Lie (in min.) -           Peripheral muscle strengthening which included 1 set of 15-20 repetitions at a slow, controlled 10-13 second per rep pace focused on strengthening supporting musculature for improved body mechanics and functional mobility.  Pt and therapist focused on proper form during treatment to ensure optimal strengthening of each targeted muscle group.  Machines were utilized including torso rotation, leg extension, leg curl, chest press, upright row. Tricep extension, bicep curl, leg press, and hip abduction added visit 3    Rabia received the following manual therapy techniques x 15 minutes:      Discussed the purpose, mechanism, and indications for dry needling with Rabia. Patient was cleared of all precautions and contraindications and pt signed written consent and gave verbal consent to dry needling Rx today.   Palpation used to determine dry needling sites. Increased tone/trigger points noted at R levator scapula, upper trapezius muscle and along the medial scapular border. Pt rec'd dry needling in prone position to R levator scapulae, upper trapezius, C4,5,6.. Pt rec'd dry needling in Prone position with RUE in IR to wing the scapula and to allow access to needle trigger points along the medial scapular border, PT in attendance throughout dry needling treatment.    Pt tolerated treatment well and was not in any distress at the completion of treatment.        Home Exercises Provided and Patient Education Provided     Education provided:   - response to therapy  -importance and role of HEP    Written Home Exercises Provided: Patient instructed to cont prior HEP.  Exercises were reviewed and Rabia was  able to demonstrate them prior to the end of the session.  Rabia demonstrated good  understanding of the education provided.     See EMR under Patient Instructions for exercises provided prior visit.          Assessment   Vanessa was progressed with resistance with MED X and peripheral machines today with a  low PE with most exs and min increase with others. She was w/o c/o pn sx throughout tx. Vanessa is making good progress with both exs and reduction of sx.   Patient is making good progress towards established goals.  Pt will continue to benefit from skilled outpatient physical therapy to address the deficits stated in the impairment chart, provide pt/family education and to maximize pt's level of independence in the home and community environment.     Anticipated Barriers for therapy: residual deficits from surgery     Pt's spiritual, cultural and educational needs considered and pt agreeable to plan of care and goals as stated below:         Goals:   Short term goals: 6 weeks or 10 visits   1.  Pt will demonstratte increased cervical ROM as measured by med ex by 3 degrees from initial test which results in improved  ROM of neck for ease with ADLs and driving  2. Pt will demonstrate independence with reducing or controlling symptoms with ther ex, movement, or position independently, able to reduce pain 1-2 points on pain scale using strategies taught in therapy  3. Pt will demonstrate increased maximum isometric torque value by 25% when compared to the initial value resulting in improved ability to perform bending, lifting, and carrying activities safely, confidently.           Long term goals: 13 weeks or 20 visits  1. Pt will demonstratte increased cervical ROM as measured by med ex by 6 degrees from initial test which results in functional ROM of neck for ease with ADLs and driving  2. Pt will demonstrate increased isometric torque by 50% from initial test to improve ability to lift and carry, and sustain good  posture while performing ADL's  3.Pt will demonstrate reduced pain and improved functional outcomes as reported on the FOTO by reaching a score of CJ = at least 20% but < 40% impaired, limited or restricted or less in order to demonstrate subjective improvement in pt's condition.    4. Pt will demonstrate independence with reducing or controlling symptoms with ther ex, movement, or position independently, able to reduce pain 2-4 points on pain scale using strategies taught in therapy  5. Pt will demonstrate independence with the HEP at discharge.   6.  Be able to swing tennis racket without pain.         Plan   Continue with established Plan of Care towards established PT goals.   Outpatient physical therapy 2x week for 10 weeks or 20 visits to include the following:   - Patient education  - Therapeutic exercise  - Manual therapy  - Performance testing   - Neuromuscular Re-education  - Therapeutic activity   - Modalities     Pt may be seen by PTA as part of the rehabilitation team.

## 2019-11-07 ENCOUNTER — CLINICAL SUPPORT (OUTPATIENT)
Dept: REHABILITATION | Facility: HOSPITAL | Age: 54
End: 2019-11-07
Attending: STUDENT IN AN ORGANIZED HEALTH CARE EDUCATION/TRAINING PROGRAM
Payer: COMMERCIAL

## 2019-11-07 DIAGNOSIS — M54.2 CERVICALGIA: ICD-10-CM

## 2019-11-07 PROCEDURE — 97110 THERAPEUTIC EXERCISES: CPT | Mod: PO | Performed by: PHYSICAL THERAPIST

## 2019-11-07 PROCEDURE — 97140 MANUAL THERAPY 1/> REGIONS: CPT | Mod: PO | Performed by: PHYSICAL THERAPIST
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
## 2019-11-07 NOTE — PROGRESS NOTES
[facility]      Name: Rabia Winn  Clinic Number: [id number]    Therapy Diagnosis:   Encounter Diagnosis   Name Primary?    Cervicalgia      Physician: Jesus Kerr MD    Visit Date: 2019  Physician Orders: Amb referral to [facility]   Medical Diagnosis from Referral: s/p cervical fusion  Evaluation Date: 2019  Authorization Period Expiration: 19  Plan of Care Expiration: 19  New POC expiration: 2019  Reassessment Due: 19  Visit # / Visits authorized:      Time In: 235  Time Out: 3:30p  Total Billable Time: 55 minutes     Precautions: ACDF 19     Pattern of pain determined: REP      Subjective   Rabia reports that she is feeling better and having less pain. I think the needling is helping.     Patient reports tolerating previous visit some R shld and scap soreness  Patient reports their pain to be 0/10 on a 0-10 scale with 0 being no pain and 10 being the worst pain imaginable.  Pain Location: R shld and scap     Work and leisure: Occupation: , lots of sitting  Leisure: working out 7 days, playing tennis 3 days a week        Pt goals: no pain, build my strength back up       Objective     Baseline IM Testing Results:   Date of testin19  ROM 48-75 deg   Max Peak Torque 174   Min Peak Torque 132   Flex/Ext Ratio 1.3:1   % below normative data 22%         Outcomes:  Initial score:Limitation Score: %   Visit 5 score:  Goal:      Treatment    Pt was instructed in and performed the following:     Rabia received therapeutic exercises to develop/improved posture, cardiovascular endurance, muscular endurance, lumbar/cervical ROM, strength and muscular endurance for 40 minutes including the following exercises:     Levator scapulae stretch, 3x30s - NP   R scapular setting in prone, 20x5s hold - NP   Scap retraction 20x 5 sec hold - NP        [facility] 2019   Visit Number 13   VAS Pain Rating 3    Cervical Stretches - Retraction In Lying -   Retraction in Sitting -   Sidebending -   Rotation -   Manual Therapy -   Cervical Extension Seat Pad 2   Seat Adjustment 399   Top Dead Center 66   Counterweight 0.1   Cervical Flexion 75   Cervical Extension 48   Cervical Peak Torque 174   Cervical Weight -   Repetitions -   Rating of Perceived Exertion -   Ice - Z Lie (in min.) -           Peripheral muscle strengthening which included 1 set of 15-20 repetitions at a slow, controlled 10-13 second per rep pace focused on strengthening supporting musculature for improved body mechanics and functional mobility.  Pt and therapist focused on proper form during treatment to ensure optimal strengthening of each targeted muscle group.  Machines were utilized including torso rotation, leg extension, leg curl, chest press, upright row. Tricep extension, bicep curl, leg press, and hip abduction added visit 3    Rabia received the following manual therapy techniques x 15 minutes:      Discussed the purpose, mechanism, and indications for dry needling with Rabia. Patient was cleared of all precautions and contraindications and pt signed written consent and gave verbal consent to dry needling Rx today.   Palpation used to determine dry needling sites. Increased tone/trigger points noted at R levator scapula, upper trapezius muscle and along the medial scapular border. Pt rec'd dry needling in prone position to R levator scapulae, upper trapezius, C4,5,6.. Pt rec'd dry needling in Prone position with RUE in IR to wing the scapula and to allow access to needle trigger points along the medial scapular border, PT in attendance throughout dry needling treatment.    Pt tolerated treatment well and was not in any distress at the completion of treatment.        Home Exercises Provided and Patient Education Provided     Education provided:   - response to therapy  -importance and role of HEP    Written Home Exercises Provided: Patient  instructed to cont prior HEP.  Exercises were reviewed and Rabia was able to demonstrate them prior to the end of the session.  Rabia demonstrated good  understanding of the education provided.     See EMR under Patient Instructions for exercises provided prior visit.          Assessment   Vanessa has made significant strength gains and has had reduced pain levels. ROM remains limited, which is expected due to cervical fusion. She will benefit from continued participation in the healthy back program to continue to work towards age norms and imporve function   Patient is making good progress towards established goals.  Pt will continue to benefit from skilled outpatient physical therapy to address the deficits stated in the impairment chart, provide pt/family education and to maximize pt's level of independence in the home and community environment.     Anticipated Barriers for therapy: residual deficits from surgery     Pt's spiritual, cultural and educational needs considered and pt agreeable to plan of care and goals as stated below:         Goals:   Short term goals: 6 weeks or 10 visits   1.  Pt will demonstratte increased cervical ROM as measured by med ex by 3 degrees from initial test which results in improved  ROM of neck for ease with ADLs and driving  2. Pt will demonstrate independence with reducing or controlling symptoms with ther ex, movement, or position independently, able to reduce pain 1-2 points on pain scale using strategies taught in therapy  3. Pt will demonstrate increased maximum isometric torque value by 25% when compared to the initial value resulting in improved ability to perform bending, lifting, and carrying activities safely, confidently.           Long term goals: 13 weeks or 20 visits  1. Pt will demonstratte increased cervical ROM as measured by med ex by 6 degrees from initial test which results in functional ROM of neck for ease with ADLs and driving  2. Pt will demonstrate  increased isometric torque by 50% from initial test to improve ability to lift and carry, and sustain good posture while performing ADL's  3.Pt will demonstrate reduced pain and improved functional outcomes as reported on the FOTO by reaching a score of CJ = at least 20% but < 40% impaired, limited or restricted or less in order to demonstrate subjective improvement in pt's condition.    4. Pt will demonstrate independence with reducing or controlling symptoms with ther ex, movement, or position independently, able to reduce pain 2-4 points on pain scale using strategies taught in therapy  5. Pt will demonstrate independence with the HEP at discharge.   6.  Be able to swing tennis racket without pain.         Plan   Continue with established Plan of Care towards established PT goals.   Outpatient physical therapy 2x week for 10 weeks or 20 visits to include the following:   - Patient education  - Therapeutic exercise  - Manual therapy  - Performance testing   - Neuromuscular Re-education  - Therapeutic activity   - Modalities     Pt may be seen by PTA as part of the rehabilitation team.

## 2019-11-14 ENCOUNTER — OFFICE VISIT (OUTPATIENT)
Dept: NEUROSURGERY | Facility: CLINIC | Age: 54
End: 2019-11-14
Payer: COMMERCIAL

## 2019-11-14 ENCOUNTER — TELEPHONE (OUTPATIENT)
Dept: PAIN MEDICINE | Facility: CLINIC | Age: 54
End: 2019-11-14

## 2019-11-14 VITALS — HEART RATE: 73 BPM | DIASTOLIC BLOOD PRESSURE: 93 MMHG | SYSTOLIC BLOOD PRESSURE: 172 MMHG

## 2019-11-14 DIAGNOSIS — Z98.1 S/P CERVICAL SPINAL FUSION: Primary | ICD-10-CM

## 2019-11-14 PROCEDURE — 99999 PR PBB SHADOW E&M-EST. PATIENT-LVL III: ICD-10-PCS | Mod: PBBFAC,,, | Performed by: PHYSICIAN ASSISTANT

## 2019-11-14 PROCEDURE — 99211 OFF/OP EST MAY X REQ PHY/QHP: CPT | Mod: S$GLB,,, | Performed by: PHYSICIAN ASSISTANT

## 2019-11-14 PROCEDURE — 99211 PR OFFICE/OUTPT VISIT, EST, LEVL I: ICD-10-PCS | Mod: S$GLB,,, | Performed by: PHYSICIAN ASSISTANT

## 2019-11-14 PROCEDURE — 99999 PR PBB SHADOW E&M-EST. PATIENT-LVL III: CPT | Mod: PBBFAC,,, | Performed by: PHYSICIAN ASSISTANT

## 2019-11-14 NOTE — PROGRESS NOTES
[facility] Neurosurgery  Clinic Progress Note    PCP: MALLORY Fischer MD    Chief Complaint:   Chief Complaint   Patient presents with    Follow-up     Patient reports right shoulder pain; still in therapy; pain 4/10         Past Medical History:   Diagnosis Date    Arthritis     Asthma     DDD (degenerative disc disease), cervical     DDD (degenerative disc disease), lumbar     Epilepsy     HTN (hypertension)      Past Surgical History:   Procedure Laterality Date    ANTERIOR CERVICAL DISCECTOMY W/ FUSION N/A 2/5/2019    Procedure: DISCECTOMY, SPINE, CERVICAL, ANTERIOR APPROACH, WITH FUSION C3-4, 4-5, 5-6, 6-7;  Surgeon: Jesus Kerr MD;  Location: [facility] OR [address] [place] [address];  Service: Neurosurgery;  Laterality: N/A;    GANGLION CYST EXCISION Right     right breast    HYSTERECTOMY      INJECTION OF ANESTHETIC AGENT AROUND MEDIAL BRANCH NERVES INNERVATING CERVICAL FACET JOINT Bilateral 12/19/2018    Procedure: Block-nerve-medial branch-cervical C3-6;  Surgeon: Charlie Bowles MD;  Location: [facility] OR;  Service: Pain Management;  Laterality: Bilateral;     Family History   Problem Relation Age of Onset    Cancer Mother      Social History     Tobacco Use    Smoking status: Never Smoker    Smokeless tobacco: Never Used   Substance Use Topics    Alcohol use: Yes     Comment: rarely    Drug use: No      Review of patient's allergies indicates:  No Known Allergies    Current Outpatient Medications:     baclofen (LIORESAL) 10 MG tablet, Take 1 tablet (10 mg total) by mouth 2 (two) times daily as needed., Disp: 60 tablet, Rfl: 0    celecoxib (CELEBREX) 200 MG capsule, Take 1 capsule (200 mg total) by mouth daily as needed for Pain., Disp: 30 capsule, Rfl: 1    gabapentin (NEURONTIN) 100 MG capsule, Take 100 mg by mouth once., Disp: , Rfl:     losartan (COZAAR) 50 MG tablet, Take 1 tablet (50 mg total) by mouth once daily., Disp: 90 tablet, Rfl: 3    zolpidem (AMBIEN) 5 MG Tab, TAKE 1 TABLET (5 MG TOTAL) BY MOUTH  "NIGHTLY AS NEEDED., Disp: 30 tablet, Rfl: 2    Review of Systems:   Constitutional: no fever, chills or night sweats. No changes in weight   Respiratory: no cough or shortness of breath   Cardiovascular: no chest pain or palpitations   Musculoskeletal:  Positive for myalgias  Neurological: no seizures or tremors         OBJECTIVE:     Vital Signs (Most Recent):  Pulse: 73 (11/14/19 1454)  BP: (!) 172/93 (11/14/19 1454)  Estimated body mass index is 19.54 kg/m² as calculated from the following:    Height as of 10/4/19: 5' 1" (1.549 m).    Weight as of 10/4/19: 46.9 kg (103 lb 6.3 oz).    Physical Exam:   General: well developed, well nourished, no distress.   Neurologic: Alert and oriented. Thought content appropriate. GCS 15.   Head: normocephalic, atraumatic  Eyes: EOMI.  Neck: trachea midline, no JVD   Sensory: intact to light touch throughout   Motor Strength: Moves all extremities spontaneously with good tone. No abnormal movements seen.   Strength  Deltoids Triceps Biceps Wrist Extension Wrist Flexion Hand  Interossei   Upper: R 5/5 5/5 5/5 5/5 5/5 5/5 5/5    L 5/5 5/5 5/5 5/5 5/5 5/5 5/5     Gait: normal    Cervical Spine:  Decreased range of motion  Mild tenderness to palpation over right trap        Provider Dictation:     Rabia Winn is a 54 y.o. female who is now 9 months s/p C3-7 ACDF for cervical kyphosis. She was last seen at 6 months post op with complaints of right trapezius muscle pain. She reported improvement in neck pain and radiculopathy. She had mild right deltoid weakness postoperatively which was improving at 6 months post op. She experienced difficulty swallowing post operatively.  Her when ED with balloon dilation of her esophagus with Dr. Carr with mild improvement.  She has since followed up with GI who recommended either more surgery to cut the muscle versus specialist on the [place] for Botox injections into her throat.  She states she is not interested traveling to " the [place] at this time and would like continue to monitor her swallowing.  She states it is improving.  She has been attending physical therapy and receiving dry needling.  She states this somewhat helps the pain in her trap.  She received a trigger point injection with Dr. Bowles without significant improvement.     Imaging independently reviewed.  X-ray cervical spine at 6 months reveals stable hardware without evidence of failure.      Given the above, I recommend discussion about Botox injections with pain management.  Patient will continue to monitor her swallowing issues and she is not ready to pursue treatment on the [place].     Patient verbalized understanding of plan. Encouraged to call with any questions or concerns.     This note was partially dictated using voice recognition software, so please excuse any errors that were not corrected.    S/P cervical spinal fusion

## 2019-11-14 NOTE — TELEPHONE ENCOUNTER
----- Message from Bing Quijano PA-C sent at 11/14/2019  3:30 PM CST -----  Hi, Dr. Kerr saw this patient in August and recommended TPI or Botox for her right trap pain. She did receive a TPI with Dr. Bowles without relief. Would Dr. Bowles be able to try botox injections or will I need to refer to someone else?    Thanks!  Bing Quijano PA-C

## 2019-11-18 DIAGNOSIS — M47.812 SPONDYLOSIS OF CERVICAL REGION WITHOUT MYELOPATHY OR RADICULOPATHY: ICD-10-CM

## 2019-11-18 RX ORDER — CELECOXIB 200 MG/1
200 CAPSULE ORAL DAILY PRN
Qty: 30 CAPSULE | Refills: 1 | Status: SHIPPED | OUTPATIENT
Start: 2019-11-18 | End: 2020-01-29 | Stop reason: SDUPTHER

## 2019-11-22 ENCOUNTER — OFFICE VISIT (OUTPATIENT)
Dept: CARDIOLOGY | Facility: CLINIC | Age: 54
End: 2019-11-22
Payer: COMMERCIAL

## 2019-11-22 VITALS
SYSTOLIC BLOOD PRESSURE: 162 MMHG | HEART RATE: 78 BPM | DIASTOLIC BLOOD PRESSURE: 93 MMHG | BODY MASS INDEX: 19.9 KG/M2 | WEIGHT: 105.38 LBS | HEIGHT: 61 IN

## 2019-11-22 DIAGNOSIS — I10 ESSENTIAL HYPERTENSION: Primary | ICD-10-CM

## 2019-11-22 PROCEDURE — 99204 OFFICE O/P NEW MOD 45 MIN: CPT | Mod: S$GLB,,, | Performed by: INTERNAL MEDICINE

## 2019-11-22 PROCEDURE — 99204 PR OFFICE/OUTPT VISIT, NEW, LEVL IV, 45-59 MIN: ICD-10-PCS | Mod: S$GLB,,, | Performed by: INTERNAL MEDICINE

## 2019-11-22 PROCEDURE — 99999 PR PBB SHADOW E&M-EST. PATIENT-LVL III: CPT | Mod: PBBFAC,,, | Performed by: INTERNAL MEDICINE

## 2019-11-22 PROCEDURE — 99999 PR PBB SHADOW E&M-EST. PATIENT-LVL III: ICD-10-PCS | Mod: PBBFAC,,, | Performed by: INTERNAL MEDICINE

## 2019-11-22 RX ORDER — HYDROCHLOROTHIAZIDE 12.5 MG/1
12.5 TABLET ORAL DAILY
Qty: 90 TABLET | Refills: 3 | Status: SHIPPED | OUTPATIENT
Start: 2019-11-22 | End: 2022-08-18 | Stop reason: SDUPTHER

## 2019-11-22 RX ORDER — LOSARTAN POTASSIUM 50 MG/1
50 TABLET ORAL DAILY
Qty: 90 TABLET | Refills: 3 | Status: SHIPPED | OUTPATIENT
Start: 2019-11-22 | End: 2020-11-09
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
## 2019-11-25 ENCOUNTER — TELEPHONE (OUTPATIENT)
Dept: PAIN MEDICINE | Facility: CLINIC | Age: 54
End: 2019-11-25

## 2019-11-25 NOTE — TELEPHONE ENCOUNTER
----- Message from Ronald HUANG Frisard sent at 11/21/2019 12:31 PM CST -----  Contact: same  Patient called in and stated she was returning call to office from yesterday about scheduling her procedure.  Call back number is [phone]

## 2019-12-09 DIAGNOSIS — M54.16 LUMBAR RADICULOPATHY: ICD-10-CM

## 2019-12-09 DIAGNOSIS — M79.10 MYALGIA: Primary | ICD-10-CM

## 2019-12-09 DIAGNOSIS — M47.812 CERVICAL SPONDYLOSIS: ICD-10-CM

## 2019-12-09 DIAGNOSIS — M47.812 SPONDYLOSIS OF CERVICAL REGION WITHOUT MYELOPATHY OR RADICULOPATHY: ICD-10-CM

## 2019-12-09 RX ORDER — BACLOFEN 10 MG/1
10 TABLET ORAL 2 TIMES DAILY PRN
Qty: 60 TABLET | Refills: 0 | Status: SHIPPED | OUTPATIENT
Start: 2019-12-09 | End: 2020-01-14 | Stop reason: SDUPTHER

## 2019-12-10 DIAGNOSIS — M47.812 SPONDYLOSIS OF CERVICAL REGION WITHOUT MYELOPATHY OR RADICULOPATHY: ICD-10-CM

## 2019-12-10 RX ORDER — CELECOXIB 200 MG/1
200 CAPSULE ORAL DAILY PRN
Qty: 30 CAPSULE | Refills: 1 | OUTPATIENT
Start: 2019-12-10

## 2019-12-17 ENCOUNTER — OFFICE VISIT (OUTPATIENT)
Dept: OBSTETRICS AND GYNECOLOGY | Facility: CLINIC | Age: 54
End: 2019-12-17
Payer: COMMERCIAL

## 2019-12-17 VITALS — SYSTOLIC BLOOD PRESSURE: 128 MMHG | DIASTOLIC BLOOD PRESSURE: 76 MMHG | WEIGHT: 104.5 LBS | BODY MASS INDEX: 19.74 KG/M2

## 2019-12-17 DIAGNOSIS — N95.1 MENOPAUSAL SYNDROME: Primary | ICD-10-CM

## 2019-12-17 PROCEDURE — 99999 PR PBB SHADOW E&M-EST. PATIENT-LVL III: CPT | Mod: PBBFAC,,, | Performed by: OBSTETRICS & GYNECOLOGY

## 2019-12-17 PROCEDURE — 99999 PR PBB SHADOW E&M-EST. PATIENT-LVL III: ICD-10-PCS | Mod: PBBFAC,,, | Performed by: OBSTETRICS & GYNECOLOGY

## 2019-12-17 PROCEDURE — 99386 PREV VISIT NEW AGE 40-64: CPT | Mod: S$GLB,,, | Performed by: OBSTETRICS & GYNECOLOGY

## 2019-12-17 PROCEDURE — 99386 PR PREVENTIVE VISIT,NEW,40-64: ICD-10-PCS | Mod: S$GLB,,, | Performed by: OBSTETRICS & GYNECOLOGY

## 2019-12-17 RX ORDER — AZELASTINE 1 MG/ML
2 SPRAY, METERED NASAL 2 TIMES DAILY
Refills: 5 | COMMUNITY
Start: 2019-11-18

## 2019-12-17 NOTE — PROGRESS NOTES
Chief Complaint   Patient presents with    Well Woman       History and Physical:  No LMP recorded. Patient has had a hysterectomy.       Rabia Winn is a 54 y.o.  female who presents today for her routine annual GYN exam. The patient has no Gynecology complaints today. No bowel or bladder complaints. Slight vaginal dryness, counseled       Allergies: Review of patient's allergies indicates:  No Known Allergies    Past Medical History:   Diagnosis Date    Arthritis     Asthma     DDD (degenerative disc disease), cervical     DDD (degenerative disc disease), lumbar     Epilepsy     HTN (hypertension)        Past Surgical History:   Procedure Laterality Date    ANTERIOR CERVICAL DISCECTOMY W/ FUSION N/A 2/5/2019    Procedure: DISCECTOMY, SPINE, CERVICAL, ANTERIOR APPROACH, WITH FUSION C3-4, 4-5, 5-6, 6-7;  Surgeon: Jesus Kerr MD;  Location: [address];  Service: Neurosurgery;  Laterality: N/A;    GANGLION CYST EXCISION Right     right breast    HYSTERECTOMY      INJECTION OF ANESTHETIC AGENT AROUND MEDIAL BRANCH NERVES INNERVATING CERVICAL FACET JOINT Bilateral 12/19/2018    Procedure: Block-nerve-medial branch-cervical C3-6;  Surgeon: Charlie Bowles MD;  Location: [facility];  Service: Pain Management;  Laterality: Bilateral;       MEDS:   Current Outpatient Medications on File Prior to Visit   Medication Sig Dispense Refill    azelastine (ASTELIN) 137 mcg (0.1 %) nasal spray 2 sprays 2 (two) times daily.  5    baclofen (LIORESAL) 10 MG tablet Take 1 tablet (10 mg total) by mouth 2 (two) times daily as needed. 60 tablet 0    celecoxib (CELEBREX) 200 MG capsule Take 1 capsule (200 mg total) by mouth daily as needed for Pain. 30 capsule 1    gabapentin (NEURONTIN) 100 MG capsule Take 100 mg by mouth once.      hydroCHLOROthiazide (HYDRODIURIL) 12.5 MG Tab Take 1 tablet (12.5 mg total) by mouth once daily. 90 tablet 3    losartan (COZAAR) 50 MG tablet Take 1 tablet (50 mg total)  by mouth once daily. 90 tablet 3    zolpidem (AMBIEN) 5 MG Tab TAKE 1 TABLET (5 MG TOTAL) BY MOUTH NIGHTLY AS NEEDED. 30 tablet 2     No current facility-administered medications on file prior to visit.        OB History        3    Para   3    Term   3            AB        Living           SAB        TAB        Ectopic        Multiple        Live Births                     Social History     Socioeconomic History    Marital status:      Spouse name: Not on file    Number of children: Not on file    Years of education: Not on file    Highest education level: Not on file   Occupational History    Not on file   Social Needs    Financial resource strain: Not on file    Food insecurity:     Worry: Not on file     Inability: Not on file    Transportation needs:     Medical: Not on file     Non-medical: Not on file   Tobacco Use    Smoking status: Never Smoker    Smokeless tobacco: Never Used   Substance and Sexual Activity    Alcohol use: Yes     Comment: rarely    Drug use: No    Sexual activity: Yes     Partners: Male   Lifestyle    Physical activity:     Days per week: Not on file     Minutes per session: Not on file    Stress: Not on file   Relationships    Social connections:     Talks on phone: Not on file     Gets together: Not on file     Attends Faith service: Not on file     Active member of club or organization: Not on file     Attends meetings of clubs or organizations: Not on file     Relationship status: Not on file   Other Topics Concern    Not on file   Social History Narrative    Not on file       Family History   Problem Relation Age of Onset    Cancer Mother          Past medical and surgical history reviewed.   I have reviewed the patient's medical history in detail and updated the computerized patient record.        Review of System:   General: no chills, fever, night sweats, weight gain or weight loss  Psychological: no depression or suicidal  ideation  Breasts: no new or changing breast lumps, nipple discharge or masses.  Respiratory: no cough, shortness of breath, or wheezing  Cardiovascular: no chest pain or dyspnea on exertion  Gastrointestinal: no abdominal pain, change in bowel habits, or black or bloody stools  Genito-Urinary: no incontinence, urinary frequency/urgency or vulvar/vaginal symptoms, pelvic pain or abnormal vaginal bleeding.  Musculoskeletal: no gait disturbance or muscular weakness      Physical Exam:   /76   Wt 47.4 kg (104 lb 8 oz)   BMI 19.74 kg/m²   Constitutional: She is oriented to person, place, and time. She appears well-developed and well-nourished. No distress  HENT:   Head: Normocephalic and atraumatic.   Eyes: Conjunctivae and EOM are normal. No scleral icterus.   Neck: Normal range of motion. Neck supple. No tracheal deviation present.   Cardiovascular: Normal rate.    Pulmonary/Chest: Effort normal. No respiratory distress. She exhibits no tenderness.  Breasts: are symmetrical.   Right breast exhibits no inverted nipple, no mass, no nipple discharge, no skin change and no tenderness.   Left breast exhibits no inverted nipple, no mass, no nipple discharge, no skin change and no tenderness.  Abdominal: Soft. She exhibits no distension and no mass. There is no tenderness. There is no rebound and no guarding.   Genitourinary:    External rectal exam shows no thrombosed external hemorrhoids.    Pelvic exam was performed with patient supine.   No labial fusion.   There is no rash, lesion or injury on the right labia.   There is no rash, lesion or injury on the left labia.   No bleeding and no signs of injury around the vaginal introitus, urethra is without lesions and well supported.    No vaginal discharge found. Pale and atrophic   No significant Cystocele, Enterocele or rectocele, and cuff well supported.   Bimanual exam:   The urethra and vagina are without palpable masses or tenderness.   Uterus and cervix are  surgically absents, vaginal cuff is intact and well supported.   Right adnexum displays no mass and no tenderness.   Left adnexum displays no mass and no tenderness.  Musculoskeletal: Normal range of motion.   Lymphadenopathy: No inguinal adenopathy present.   Neurological: She is alert and oriented to person, place, and time. Coordination normal.   Skin: Skin is warm and dry. She is not diaphoretic.   Psychiatric: She has a normal mood and affect.        Assessment:   Normal annual GYN exam      Plan:   PAP Not Needed  Mammogram  Schedule dexa scan  Follow up in 1 year.

## 2019-12-23 ENCOUNTER — OFFICE VISIT (OUTPATIENT)
Dept: PAIN MEDICINE | Facility: CLINIC | Age: 54
End: 2019-12-23
Payer: COMMERCIAL

## 2019-12-23 VITALS
SYSTOLIC BLOOD PRESSURE: 139 MMHG | DIASTOLIC BLOOD PRESSURE: 90 MMHG | WEIGHT: 105.69 LBS | OXYGEN SATURATION: 99 % | HEART RATE: 75 BPM | RESPIRATION RATE: 18 BRPM | TEMPERATURE: 98 F | BODY MASS INDEX: 19.97 KG/M2

## 2019-12-23 DIAGNOSIS — G24.3 CERVICAL DYSTONIA: Primary | ICD-10-CM

## 2019-12-23 PROCEDURE — 99999 PR PBB SHADOW E&M-EST. PATIENT-LVL III: CPT | Mod: PBBFAC,,, | Performed by: ANESTHESIOLOGY

## 2019-12-23 PROCEDURE — 99213 OFFICE O/P EST LOW 20 MIN: CPT | Mod: S$GLB,,, | Performed by: ANESTHESIOLOGY

## 2019-12-23 PROCEDURE — 99999 PR PBB SHADOW E&M-EST. PATIENT-LVL III: ICD-10-PCS | Mod: PBBFAC,,, | Performed by: ANESTHESIOLOGY

## 2019-12-23 PROCEDURE — 99213 PR OFFICE/OUTPT VISIT, EST, LEVL III, 20-29 MIN: ICD-10-PCS | Mod: S$GLB,,, | Performed by: ANESTHESIOLOGY
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
## 2019-12-24 ENCOUNTER — CLINICAL SUPPORT (OUTPATIENT)
Dept: CARDIOLOGY | Facility: CLINIC | Age: 54
End: 2019-12-24
Attending: INTERNAL MEDICINE
Payer: COMMERCIAL

## 2019-12-24 VITALS — WEIGHT: 105 LBS | HEIGHT: 61 IN | BODY MASS INDEX: 19.83 KG/M2

## 2019-12-24 DIAGNOSIS — I10 ESSENTIAL HYPERTENSION: ICD-10-CM

## 2019-12-24 LAB
ABDOMINAL AORTA PROX EDV: 15 CM/S
ABDOMINAL AORTA PROX PSV: 73 CM/S
ASCENDING AORTA: 2.99 CM
AV INDEX (PROSTH): 0.68
AV MEAN GRADIENT: 4 MMHG
AV PEAK GRADIENT: 7 MMHG
AV VALVE AREA: 2.54 CM2
AV VELOCITY RATIO: 0.71
BSA FOR ECHO PROCEDURE: 1.43 M2
CV ECHO LV RWT: 0.41 CM
DOP CALC AO PEAK VEL: 1.29 M/S
DOP CALC AO VTI: 33.24 CM
DOP CALC LVOT AREA: 3.8 CM2
DOP CALC LVOT DIAMETER: 2.19 CM
DOP CALC LVOT PEAK VEL: 0.92 M/S
DOP CALC LVOT STROKE VOLUME: 84.49 CM3
DOP CALCLVOT PEAK VEL VTI: 22.44 CM
E WAVE DECELERATION TIME: 288.07 MSEC
E/A RATIO: 0.96
E/E' RATIO: 7.47 M/S
ECHO LV POSTERIOR WALL: 0.88 CM (ref 0.6–1.1)
FRACTIONAL SHORTENING: 45 % (ref 28–44)
INTERVENTRICULAR SEPTUM: 0.79 CM (ref 0.6–1.1)
IVRT: 0.12 MSEC
LA MAJOR: 3.48 CM
LA MINOR: 4.49 CM
LA WIDTH: 3.45 CM
LEFT ATRIUM SIZE: 2.82 CM
LEFT ATRIUM VOLUME INDEX: 22.6 ML/M2
LEFT ATRIUM VOLUME: 32.43 CM3
LEFT INTERNAL DIMENSION IN SYSTOLE: 2.37 CM (ref 2.1–4)
LEFT RENAL DIST DIAS: 45 CM/S
LEFT RENAL DIST SYS: 90 CM/S
LEFT RENAL MID DIAS: 33 CM/S
LEFT RENAL MID SYS: 81 CM/S
LEFT RENAL ORIGIN DIAS: 38 CM/S
LEFT RENAL ORIGIN SYS: 85 CM/S
LEFT RENAL PROX DIAS: 35 CM/S
LEFT RENAL PROX RAR: 1.08
LEFT RENAL PROX SYS: 79 CM/S
LEFT RENAL ULTRASOUND ACCELERATION TIME MEASUREMENT 1: 55 MS
LEFT RENAL ULTRASOUND ACCELERATION TIME MEASUREMENT 2: 62 MS
LEFT RENAL ULTRASOUND ACCELERATION TIME MEASUREMENT 3: 62 MS
LEFT RENAL ULTRASOUND ACCELERATION TIME MEASUREMENT AVERAGE: 62 MS
LEFT RENAL ULTRASOUND KIDNEY SIZE MEASUREMENT 1: 11.3 CM
LEFT RENAL ULTRASOUND KIDNEY SIZE MEASUREMENT 2: 11.1 CM
LEFT RENAL ULTRASOUND KIDNEY SIZE MEASUREMENT 3: 11 CM
LEFT RENAL ULTRASOUND KIDNEY SIZE MEASUREMENT AVERAGE: 11.3 CM
LEFT RENAL ULTRASOUND RESISTIVE INDEX MEASUREMENT 1: 0.52
LEFT RENAL ULTRASOUND RESISTIVE INDEX MEASUREMENT 2: 0.58
LEFT RENAL ULTRASOUND RESISTIVE INDEX MEASUREMENT 3: 0.49
LEFT RENAL ULTRASOUND RESISTIVE INDEX MEASUREMENT AVERAGE: 0.58
LEFT VENTRICLE DIASTOLIC VOLUME INDEX: 57.07 ML/M2
LEFT VENTRICLE DIASTOLIC VOLUME: 81.98 ML
LEFT VENTRICLE MASS INDEX: 77 G/M2
LEFT VENTRICLE SYSTOLIC VOLUME INDEX: 13.6 ML/M2
LEFT VENTRICLE SYSTOLIC VOLUME: 19.48 ML
LEFT VENTRICULAR INTERNAL DIMENSION IN DIASTOLE: 4.28 CM (ref 3.5–6)
LEFT VENTRICULAR MASS: 110.63 G
LV LATERAL E/E' RATIO: 6.45 M/S
LV SEPTAL E/E' RATIO: 8.88 M/S
MV PEAK A VEL: 0.74 M/S
MV PEAK E VEL: 0.71 M/S
OHS CV LEFT RENAL RAR: 1.23
OHS CV RIGHT RENAL RAR: 1.32
OHS CV US LEFT RENAL HIGHEST EDV: 45
OHS CV US LEFT RENAL HIGHEST PSV: 90
OHS CV US RIGHT RENAL HIGHEST EDV: 37
OHS CV US RIGHT RENAL HIGHEST PSV: 96
PISA TR MAX VEL: 2.58 M/S
PULM VEIN S/D RATIO: 1.23
PV PEAK D VEL: 0.4 M/S
PV PEAK S VEL: 0.49 M/S
RA MAJOR: 4.25 CM
RA PRESSURE: 3 MMHG
RA WIDTH: 3.29 CM
RIGHT RENAL DIST DIAS: 29 CM/S
RIGHT RENAL DIST SYS: 83 CM/S
RIGHT RENAL MID DIAS: 35 CM/S
RIGHT RENAL MID SYS: 77 CM/S
RIGHT RENAL ORIGIN DIAS: 26 CM/S
RIGHT RENAL ORIGIN SYS: 75 CM/S
RIGHT RENAL PROX DIAS: 37 CM/S
RIGHT RENAL PROX RAR: 1.32
RIGHT RENAL PROX SYS: 96 CM/S
RIGHT RENAL ULTRASOUND ACCELERATION TIME MEASUREMENT 1: 69 MS
RIGHT RENAL ULTRASOUND ACCELERATION TIME MEASUREMENT 2: 38 MS
RIGHT RENAL ULTRASOUND ACCELERATION TIME MEASUREMENT 3: 76 MS
RIGHT RENAL ULTRASOUND ACCELERATION TIME MEASUREMENT AVERAGE: 76 MS
RIGHT RENAL ULTRASOUND KIDNEY SIZE MEASUREMENT 1: 8.8 CM
RIGHT RENAL ULTRASOUND KIDNEY SIZE MEASUREMENT 2: 8.8 CM
RIGHT RENAL ULTRASOUND KIDNEY SIZE MEASUREMENT 3: 8.9 CM
RIGHT RENAL ULTRASOUND KIDNEY SIZE MEASUREMENT AVERAGE: 8.9 CM
RIGHT RENAL ULTRASOUND RESISTIVE INDEX MEASUREMENT 1: 0.59
RIGHT RENAL ULTRASOUND RESISTIVE INDEX MEASUREMENT 2: 0.58
RIGHT RENAL ULTRASOUND RESISTIVE INDEX MEASUREMENT 3: 0.59
RIGHT RENAL ULTRASOUND RESISTIVE INDEX MEASUREMENT AVERAGE: 0.59
RIGHT VENTRICULAR END-DIASTOLIC DIMENSION: 2.85 CM
SINUS: 3.05 CM
STJ: 2.64 CM
TDI LATERAL: 0.11 M/S
TDI SEPTAL: 0.08 M/S
TDI: 0.1 M/S
TR MAX PG: 27 MMHG
TRICUSPID ANNULAR PLANE SYSTOLIC EXCURSION: 1.67 CM
TV REST PULMONARY ARTERY PRESSURE: 30 MMHG

## 2019-12-24 PROCEDURE — 93975 VASCULAR STUDY: CPT | Mod: S$GLB,,, | Performed by: INTERNAL MEDICINE

## 2019-12-24 PROCEDURE — 99999 PR PBB SHADOW E&M-EST. PATIENT-LVL I: CPT | Mod: PBBFAC,,,

## 2019-12-24 PROCEDURE — 93306 ECHO (CUPID ONLY): ICD-10-PCS | Mod: S$GLB,,, | Performed by: INTERNAL MEDICINE

## 2019-12-24 PROCEDURE — 93306 TTE W/DOPPLER COMPLETE: CPT | Mod: S$GLB,,, | Performed by: INTERNAL MEDICINE

## 2019-12-24 PROCEDURE — 99999 PR PBB SHADOW E&M-EST. PATIENT-LVL I: ICD-10-PCS | Mod: PBBFAC,,,

## 2019-12-24 PROCEDURE — 93224 XTRNL ECG REC UP TO 48 HRS: CPT | Mod: S$GLB,,, | Performed by: INTERNAL MEDICINE

## 2019-12-24 PROCEDURE — 93224 HOLTER MONITOR - 48 HOUR (CUPID ONLY): ICD-10-PCS | Mod: S$GLB,,, | Performed by: INTERNAL MEDICINE

## 2019-12-24 PROCEDURE — 93975 CV US RENAL ARTERY STENOSIS HYPERTENSION COMPLETE (CUPID ONLY): ICD-10-PCS | Mod: S$GLB,,, | Performed by: INTERNAL MEDICINE

## 2019-12-26 ENCOUNTER — TELEPHONE (OUTPATIENT)
Dept: CARDIOLOGY | Facility: CLINIC | Age: 54
End: 2019-12-26

## 2020-01-02 ENCOUNTER — TELEPHONE (OUTPATIENT)
Dept: CARDIOLOGY | Facility: CLINIC | Age: 55
End: 2020-01-02

## 2020-01-02 LAB
OHS CV EVENT MONITOR DAY: 0
OHS CV HOLTER LENGTH DECIMAL HOURS: 48
OHS CV HOLTER LENGTH HOURS: 48
OHS CV HOLTER LENGTH MINUTES: 0

## 2020-01-08 DIAGNOSIS — G24.3 CERVICAL DYSTONIA: Primary | ICD-10-CM

## 2020-01-10 ENCOUNTER — PATIENT MESSAGE (OUTPATIENT)
Dept: PAIN MEDICINE | Facility: CLINIC | Age: 55
End: 2020-01-10

## 2020-01-14 DIAGNOSIS — M47.812 SPONDYLOSIS OF CERVICAL REGION WITHOUT MYELOPATHY OR RADICULOPATHY: ICD-10-CM

## 2020-01-14 DIAGNOSIS — M47.812 CERVICAL SPONDYLOSIS: ICD-10-CM

## 2020-01-14 DIAGNOSIS — M79.10 MYALGIA: ICD-10-CM

## 2020-01-14 DIAGNOSIS — M54.16 LUMBAR RADICULOPATHY: ICD-10-CM

## 2020-01-14 RX ORDER — BACLOFEN 10 MG/1
10 TABLET ORAL 2 TIMES DAILY PRN
Qty: 60 TABLET | Refills: 0 | Status: SHIPPED | OUTPATIENT
Start: 2020-01-14 | End: 2020-01-24 | Stop reason: SDUPTHER

## 2020-01-24 ENCOUNTER — PROCEDURE VISIT (OUTPATIENT)
Dept: PAIN MEDICINE | Facility: CLINIC | Age: 55
End: 2020-01-24
Payer: COMMERCIAL

## 2020-01-24 VITALS
BODY MASS INDEX: 20.39 KG/M2 | DIASTOLIC BLOOD PRESSURE: 86 MMHG | HEART RATE: 78 BPM | TEMPERATURE: 97 F | OXYGEN SATURATION: 98 % | SYSTOLIC BLOOD PRESSURE: 137 MMHG | RESPIRATION RATE: 18 BRPM | WEIGHT: 107.94 LBS

## 2020-01-24 DIAGNOSIS — M47.812 SPONDYLOSIS OF CERVICAL REGION WITHOUT MYELOPATHY OR RADICULOPATHY: ICD-10-CM

## 2020-01-24 DIAGNOSIS — M47.812 CERVICAL SPONDYLOSIS: ICD-10-CM

## 2020-01-24 DIAGNOSIS — M79.10 MYALGIA: ICD-10-CM

## 2020-01-24 DIAGNOSIS — G24.3 CERVICAL DYSTONIA: Primary | ICD-10-CM

## 2020-01-24 DIAGNOSIS — M54.16 LUMBAR RADICULOPATHY: ICD-10-CM

## 2020-01-24 PROCEDURE — 64616 CHEMODENERV MUSC NECK DYSTON: CPT | Mod: RT,S$GLB,, | Performed by: ANESTHESIOLOGY

## 2020-01-24 PROCEDURE — 64616 PR CHEMODENERVATION NECK MUSCLES EXC LARNYNX, UNI: ICD-10-PCS | Mod: RT,S$GLB,, | Performed by: ANESTHESIOLOGY

## 2020-01-24 RX ORDER — BACLOFEN 10 MG/1
10 TABLET ORAL 2 TIMES DAILY PRN
Qty: 60 TABLET | Refills: 0 | Status: SHIPPED | OUTPATIENT
Start: 2020-01-24 | End: 2020-03-20 | Stop reason: SDUPTHER

## 2020-01-24 NOTE — PROCEDURES
Procedure: Botox injection to right trapezius muscle  Date 1/24/2020  Pre op Diagnosis: cervical dystonia  Post op Diagnosis: Same  Lot # [id number]  Total units given: 15U  Total units wasted: 85U    Technique: Risks of the procedure were explained to the patient, informed consent was then signed.  Right trapezius region was prepped with alcohol.  Using sterile technique, after mixing a total of 100 units of onabutilin toxin A in 2 cc of normal saline for a concentration of 5 units per 0.1cc, a 30-gauge 1 inch needle was used for injecting the following muscles with corresponding units: right trapezius 3 locations, 5 units each total of 15 units. With each injection, aspiration was negative for blood or air.  Patient tolerated the procedure well. She was observed for 15 minutes before he left our office, given postoperative instructions to call for any side effects or complications.  She will follow up in the office in 6 weeks.

## 2020-01-29 DIAGNOSIS — M47.812 SPONDYLOSIS OF CERVICAL REGION WITHOUT MYELOPATHY OR RADICULOPATHY: ICD-10-CM

## 2020-01-29 RX ORDER — CELECOXIB 200 MG/1
200 CAPSULE ORAL DAILY PRN
Qty: 30 CAPSULE | Refills: 1 | Status: SHIPPED | OUTPATIENT
Start: 2020-01-29 | End: 2020-05-08 | Stop reason: SDUPTHER

## 2020-02-11 DIAGNOSIS — F51.01 PRIMARY INSOMNIA: ICD-10-CM

## 2020-02-12 RX ORDER — ZOLPIDEM TARTRATE 5 MG/1
5 TABLET ORAL NIGHTLY PRN
Qty: 30 TABLET | Refills: 2 | Status: SHIPPED | OUTPATIENT
Start: 2020-02-12 | End: 2020-05-13

## 2020-02-12 NOTE — PROGRESS NOTES
Refill Routing Note     Medication(s) are not appropriate for processing by [facility]:    Medication Outside of Protocol    Appointments  past 12m or future 3m with PCP    Date Provider   Last Visit   10/4/2019 ANN MARIE Fischer MD   Next Visit   Visit date not found ANN MARIE Fischer MD           Automatic Epic Protocol Generated Data:    Requested Prescriptions   Pending Prescriptions Disp Refills    zolpidem (AMBIEN) 5 MG Tab [Pharmacy Med Name: ZOLPIDEM TARTRATE 5 MG TABLET] 30 tablet 2     Sig: TAKE 1 TABLET (5 MG TOTAL) BY MOUTH NIGHTLY AS NEEDED.       There is no refill protocol information for this order           Note composed:7:52 AM 02/12/2020

## 2020-02-18 ENCOUNTER — TELEPHONE (OUTPATIENT)
Dept: ORTHOPEDICS | Facility: CLINIC | Age: 55
End: 2020-02-18

## 2020-02-18 ENCOUNTER — PATIENT OUTREACH (OUTPATIENT)
Dept: ADMINISTRATIVE | Facility: OTHER | Age: 55
End: 2020-02-18

## 2020-02-18 DIAGNOSIS — M79.672 LEFT FOOT PAIN: Primary | ICD-10-CM

## 2020-02-18 NOTE — TELEPHONE ENCOUNTER
Called pt to see which foot she thinks she broke a toe on? Pt states that she kicked a door frame while walking through it today and bruised her left 4 toe. Pt also c/o kavitha hip pain that has been ongoing. Xray ordered of foot.

## 2020-02-19 ENCOUNTER — OFFICE VISIT (OUTPATIENT)
Dept: ORTHOPEDICS | Facility: CLINIC | Age: 55
End: 2020-02-19
Payer: COMMERCIAL

## 2020-02-19 ENCOUNTER — HOSPITAL ENCOUNTER (OUTPATIENT)
Dept: RADIOLOGY | Facility: HOSPITAL | Age: 55
Discharge: HOME | End: 2020-02-19
Attending: NURSE PRACTITIONER
Payer: COMMERCIAL

## 2020-02-19 ENCOUNTER — TELEPHONE (OUTPATIENT)
Dept: ORTHOPEDICS | Facility: CLINIC | Age: 55
End: 2020-02-19

## 2020-02-19 VITALS
HEIGHT: 61 IN | WEIGHT: 108 LBS | SYSTOLIC BLOOD PRESSURE: 135 MMHG | DIASTOLIC BLOOD PRESSURE: 92 MMHG | HEART RATE: 66 BPM | BODY MASS INDEX: 20.39 KG/M2

## 2020-02-19 DIAGNOSIS — M70.61 GREATER TROCHANTERIC BURSITIS OF BOTH HIPS: Primary | ICD-10-CM

## 2020-02-19 DIAGNOSIS — M70.62 GREATER TROCHANTERIC BURSITIS OF BOTH HIPS: Primary | ICD-10-CM

## 2020-02-19 DIAGNOSIS — M79.672 LEFT FOOT PAIN: ICD-10-CM

## 2020-02-19 DIAGNOSIS — S92.512A CLOSED DISPLACED FRACTURE OF PROXIMAL PHALANX OF LESSER TOE OF LEFT FOOT, INITIAL ENCOUNTER: ICD-10-CM

## 2020-02-19 PROCEDURE — 99999 PR PBB SHADOW E&M-EST. PATIENT-LVL III: ICD-10-PCS | Mod: PBBFAC,,, | Performed by: NURSE PRACTITIONER

## 2020-02-19 PROCEDURE — 99999 PR PBB SHADOW E&M-EST. PATIENT-LVL III: CPT | Mod: PBBFAC,,, | Performed by: NURSE PRACTITIONER

## 2020-02-19 PROCEDURE — 99214 PR OFFICE/OUTPT VISIT, EST, LEVL IV, 30-39 MIN: ICD-10-PCS | Mod: S$GLB,,, | Performed by: NURSE PRACTITIONER

## 2020-02-19 PROCEDURE — 73630 X-RAY EXAM OF FOOT: CPT | Mod: TC,PO,LT

## 2020-02-19 PROCEDURE — 99214 OFFICE O/P EST MOD 30 MIN: CPT | Mod: S$GLB,,, | Performed by: NURSE PRACTITIONER

## 2020-02-19 PROCEDURE — 73630 XR FOOT COMPLETE 3 VIEW LEFT: ICD-10-PCS | Mod: 26,LT,, | Performed by: RADIOLOGY

## 2020-02-19 PROCEDURE — 73630 X-RAY EXAM OF FOOT: CPT | Mod: 26,LT,, | Performed by: RADIOLOGY

## 2020-02-19 NOTE — TELEPHONE ENCOUNTER
----- Message from Tara Arellano LPN sent at 2/19/2020  9:50 AM CST -----  Patient saw NYLA Headley today. Has Closed displaced fracture of proximal phalanx of lesser toe of left foot. Gave patient post-op shoe. Scheduled appt with Dr. King for next available of 3/3/2020. Do you need to see pt sooner? Please contact pt to advise. Thanks, Tara

## 2020-02-19 NOTE — PATIENT INSTRUCTIONS
Understanding Trochanteric Bursitis    A bursa is a thin, slippery, sac-like film. It contains a small amount of fluid. This structure is found between bones and soft tissues in and around joints. A bursa cushions and protects a joint. It keeps parts of a joint from rubbing against each other. If a bursa becomes inflamed and irritated, it is known as bursitis.  The trochanteric bursa is found on the hip joint. It lies on top of the bump at the top of the thighbone called the greater trochanter. Inflammation of this bursa is called trochanteric bursitis.     How to say it  [id number]   Causes of trochanteric bursitis  Causes may include:  · Overuse of the hip during running or other sports, dance, or work  · Falling on or irritation to the side of the hip  This condition may occur along with other problems, such as osteoarthritis of the hip or knee, or low back problems. In rare cases, it may occur after hip surgery.  Symptoms of trochanteric bursitis  · Pain or aching on the side of the hip. The pain may travel down the leg.  · Swelling, tenderness, or warmth on the side of the hip at the bony bump at the top of the thigh  Treatment for trochanteric bursitis  These may include:  · Resting the hip. This allows the bursa to heal.  · Prescription or over-the-counter pain medicines. These help reduce inflammation, swelling, and pain.  · Cold packs and heat packs. These help reduce pain and swelling.  · Stretching and strengthening exercises. These improve flexibility and strength around the hip.  · Physical therapy. This includes exercises or other treatments.  · Injections of medicine into the bursa. This may help reduce inflammation and relieve symptoms.  Possible complications  If you dont give your hip time to heal, the problem may not go away, may return, or may get worse. Rest and treat your hip as directed.     When to call your healthcare provider  Call your healthcare provider right away if you have  any of these:  · Fever of 100.4°F (38°C) or higher, or as directed  · Redness, swelling, or warmth that gets worse  · Symptoms that dont get better with prescribed medicines, or get worse  · New symptoms   Date Last Reviewed: 3/29/2016  © 2616-5694 [employer]. [address] [place] [address], [place] [address]. All rights reserved. This information is not intended as a substitute for professional medical care. Always follow your healthcare professional's instructions.

## 2020-02-19 NOTE — PROGRESS NOTES
Chief Complaint   Patient presents with    Left Foot - Pain, Injury     Kicked the door frame walking thru it    Right Hip - Pain    Left Hip - Pain      HPI:   This is a 54 y.o. who presents to clinic today for bilateral hip pain for the past few years after no known trauma. Complains of pain while sleeping on side and when descending stairs. Also complains of pain with bruising to left fourth toe after accidentally kicking door frame yesterday. Pain is dull. No numbness or tingling. No associated signs or symptoms.      Past Medical History:   Diagnosis Date    Arthritis     Asthma     DDD (degenerative disc disease), cervical     DDD (degenerative disc disease), lumbar     Epilepsy     HTN (hypertension)      Past Surgical History:   Procedure Laterality Date    ANTERIOR CERVICAL DISCECTOMY W/ FUSION N/A 2/5/2019    Procedure: DISCECTOMY, SPINE, CERVICAL, ANTERIOR APPROACH, WITH FUSION C3-4, 4-5, 5-6, 6-7;  Surgeon: Jesus Kerr MD;  Location: [address];  Service: Neurosurgery;  Laterality: N/A;    GANGLION CYST EXCISION Right     right breast    HYSTERECTOMY      INJECTION OF ANESTHETIC AGENT AROUND MEDIAL BRANCH NERVES INNERVATING CERVICAL FACET JOINT Bilateral 12/19/2018    Procedure: Block-nerve-medial branch-cervical C3-6;  Surgeon: Charlie Bowles MD;  Location: [facility];  Service: Pain Management;  Laterality: Bilateral;     Current Outpatient Medications on File Prior to Visit   Medication Sig Dispense Refill    azelastine (ASTELIN) 137 mcg (0.1 %) nasal spray 2 sprays 2 (two) times daily.  5    baclofen (LIORESAL) 10 MG tablet Take 1 tablet (10 mg total) by mouth 2 (two) times daily as needed. 60 tablet 0    celecoxib (CELEBREX) 200 MG capsule Take 1 capsule (200 mg total) by mouth daily as needed for Pain. 30 capsule 1    gabapentin (NEURONTIN) 100 MG capsule Take 100 mg by mouth once daily.       hydroCHLOROthiazide (HYDRODIURIL) 12.5 MG Tab Take 1 tablet (12.5 mg total) by  mouth once daily. 90 tablet 3    losartan (COZAAR) 50 MG tablet Take 1 tablet (50 mg total) by mouth once daily. 90 tablet 3    zolpidem (AMBIEN) 5 MG Tab TAKE 1 TABLET (5 MG TOTAL) BY MOUTH NIGHTLY AS NEEDED. 30 tablet 2     No current facility-administered medications on file prior to visit.      Review of patient's allergies indicates:  No Known Allergies  Family History   Problem Relation Age of Onset    Cancer Mother      Social History     Socioeconomic History    Marital status:      Spouse name: Not on file    Number of children: Not on file    Years of education: Not on file    Highest education level: Not on file   Occupational History    Not on file   Social Needs    Financial resource strain: Not on file    Food insecurity:     Worry: Not on file     Inability: Not on file    Transportation needs:     Medical: Not on file     Non-medical: Not on file   Tobacco Use    Smoking status: Never Smoker    Smokeless tobacco: Never Used   Substance and Sexual Activity    Alcohol use: Yes     Comment: rarely    Drug use: No    Sexual activity: Yes     Partners: Male   Lifestyle    Physical activity:     Days per week: Not on file     Minutes per session: Not on file    Stress: Not on file   Relationships    Social connections:     Talks on phone: Not on file     Gets together: Not on file     Attends Pent[employer]ostal service: Not on file     Active member of club or organization: Not on file     Attends meetings of clubs or organizations: Not on file     Relationship status: Not on file   Other Topics Concern    Not on file   Social History Narrative    Not on file       Review of Systems:  Constitutional:  Denies fever or chills   Eyes:  Denies change in visual acuity   HENT:  Denies nasal congestion or sore throat   Respiratory:  Denies cough or shortness of breath   Cardiovascular:  Denies chest pain or edema   GI:  Denies abdominal pain, nausea, vomiting, bloody stools or diarrhea   :   Denies dysuria   Integument:  Denies rash   Neurologic:  Denies headache, focal weakness or sensory changes   Endocrine:  Denies polyuria or polydipsia   Lymphatic:  Denies swollen glands   Psychiatric:  Denies depression or anxiety     Physical Exam:   Constitutional:  Well developed, well nourished, no acute distress, non-toxic appearance   Integument:  Well hydrated, no rash   Lymphatic:  No lymphadenopathy noted   Neurologic:  Alert & oriented x 3  Psychiatric:  Speech and behavior appropriate     Bilateral Hip Exam     Tenderness   The patient is experiencing tenderness in the greater trochanter.  Range of Motion   The patient has normal hip ROM.  Muscle Strength   Abduction: 4/5   Other   Erythema: absent  Sensation: normal  Pulse: present    X-rays were personally reviewed by me and findings discussed with the patient.  2 views of the bilateral hip show no fractures or dislocations  3 views of the left foot show mildly displaced proximal phalanx fracture of the fourth toe     Greater trochanteric bursitis of both hips    Closed displaced fracture of proximal phalanx of lesser toe of left foot, initial encounter         Treatment options discussed, will place in post op shoe and refer to Dr. King.  Wishes to defer greater troch bursa injections until phalanx fracture healed. RTC as needed.

## 2020-02-19 NOTE — TELEPHONE ENCOUNTER
Spoke to patient. Advised appointment is appropriate for when she will see Dr. King. Patient stated understanding.

## 2020-02-24 DIAGNOSIS — M79.672 LEFT FOOT PAIN: Primary | ICD-10-CM

## 2020-02-26 ENCOUNTER — PATIENT MESSAGE (OUTPATIENT)
Dept: PAIN MEDICINE | Facility: CLINIC | Age: 55
End: 2020-02-26

## 2020-02-28 ENCOUNTER — PATIENT MESSAGE (OUTPATIENT)
Dept: PAIN MEDICINE | Facility: CLINIC | Age: 55
End: 2020-02-28

## 2020-02-29 ENCOUNTER — PATIENT OUTREACH (OUTPATIENT)
Dept: ADMINISTRATIVE | Facility: OTHER | Age: 55
End: 2020-02-29

## 2020-03-03 ENCOUNTER — HOSPITAL ENCOUNTER (OUTPATIENT)
Dept: RADIOLOGY | Facility: HOSPITAL | Age: 55
Discharge: HOME | End: 2020-03-03
Attending: ORTHOPAEDIC SURGERY
Payer: COMMERCIAL

## 2020-03-03 ENCOUNTER — OFFICE VISIT (OUTPATIENT)
Dept: ORTHOPEDICS | Facility: CLINIC | Age: 55
End: 2020-03-03
Payer: COMMERCIAL

## 2020-03-03 VITALS
HEIGHT: 61 IN | WEIGHT: 108 LBS | SYSTOLIC BLOOD PRESSURE: 134 MMHG | DIASTOLIC BLOOD PRESSURE: 90 MMHG | BODY MASS INDEX: 20.39 KG/M2 | HEART RATE: 66 BPM

## 2020-03-03 DIAGNOSIS — M79.672 LEFT FOOT PAIN: ICD-10-CM

## 2020-03-03 DIAGNOSIS — S92.512A CLOSED DISPLACED FRACTURE OF PROXIMAL PHALANX OF LESSER TOE OF LEFT FOOT, INITIAL ENCOUNTER: Primary | ICD-10-CM

## 2020-03-03 PROCEDURE — 99999 PR PBB SHADOW E&M-EST. PATIENT-LVL III: CPT | Mod: PBBFAC,,, | Performed by: ORTHOPAEDIC SURGERY

## 2020-03-03 PROCEDURE — 99999 PR PBB SHADOW E&M-EST. PATIENT-LVL III: ICD-10-PCS | Mod: PBBFAC,,, | Performed by: ORTHOPAEDIC SURGERY

## 2020-03-03 PROCEDURE — 99242 PR OFFICE CONSULTATION,LEVEL II: ICD-10-PCS | Mod: S$GLB,,, | Performed by: ORTHOPAEDIC SURGERY

## 2020-03-03 PROCEDURE — 73630 XR FOOT COMPLETE 3 VIEW LEFT: ICD-10-PCS | Mod: 26,LT,, | Performed by: RADIOLOGY

## 2020-03-03 PROCEDURE — 73630 X-RAY EXAM OF FOOT: CPT | Mod: TC,PO,LT

## 2020-03-03 PROCEDURE — 99242 OFF/OP CONSLTJ NEW/EST SF 20: CPT | Mod: S$GLB,,, | Performed by: ORTHOPAEDIC SURGERY

## 2020-03-03 PROCEDURE — 73630 X-RAY EXAM OF FOOT: CPT | Mod: 26,LT,, | Performed by: RADIOLOGY

## 2020-03-03 NOTE — LETTER
March 12, 2020      Faith Martell NP  [address] [facility] [address] [place] [address]           [facility]  [address] [place] [address]  Phone: [phone]          Patient: Rabia Winn   MR Number: [id number]   YOB: 1965   Date of Visit: 3/3/2020       Dear Faith Martell:    Thank you for referring Rabia Winn to me for evaluation. Attached you will find relevant portions of my assessment and plan of care.    If you have questions, please do not hesitate to call me. I look forward to following Rabia Winn along with you.    Sincerely,    Neftali King MD    Enclosure  CC:  No Recipients    If you would like to receive this communication electronically, please contact [email or web address] or [phone] to request more information on [employer] access.    For providers and/or their staff who would like to refer a patient to [facility], please contact us through our one-stop-shop provider referral line, Dorothy Hutchison, at [phone].    If you feel you have received this communication in error or would no longer like to receive these types of communications, please e-mail [email or web address]

## 2020-03-05 ENCOUNTER — LAB VISIT (OUTPATIENT)
Dept: LAB | Facility: HOSPITAL | Age: 55
End: 2020-03-05
Attending: INTERNAL MEDICINE
Payer: COMMERCIAL

## 2020-03-05 ENCOUNTER — OFFICE VISIT (OUTPATIENT)
Dept: CARDIOLOGY | Facility: CLINIC | Age: 55
End: 2020-03-05
Payer: COMMERCIAL

## 2020-03-05 VITALS
HEIGHT: 61 IN | SYSTOLIC BLOOD PRESSURE: 137 MMHG | HEART RATE: 71 BPM | BODY MASS INDEX: 20.02 KG/M2 | WEIGHT: 106.06 LBS | DIASTOLIC BLOOD PRESSURE: 85 MMHG

## 2020-03-05 DIAGNOSIS — I10 ESSENTIAL HYPERTENSION: ICD-10-CM

## 2020-03-05 DIAGNOSIS — I10 ESSENTIAL HYPERTENSION: Primary | ICD-10-CM

## 2020-03-05 LAB
ANION GAP SERPL CALC-SCNC: 11 MMOL/L (ref 8–16)
BUN SERPL-MCNC: 20 MG/DL (ref 6–20)
CALCIUM SERPL-MCNC: 9.9 MG/DL (ref 8.7–10.5)
CHLORIDE SERPL-SCNC: 100 MMOL/L (ref 95–110)
CO2 SERPL-SCNC: 29 MMOL/L (ref 23–29)
CREAT SERPL-MCNC: 0.7 MG/DL (ref 0.5–1.4)
EST. GFR  (AFRICAN AMERICAN): >60 ML/MIN/1.73 M^2
EST. GFR  (NON AFRICAN AMERICAN): >60 ML/MIN/1.73 M^2
GLUCOSE SERPL-MCNC: 92 MG/DL (ref 70–110)
POTASSIUM SERPL-SCNC: 3.7 MMOL/L (ref 3.5–5.1)
SODIUM SERPL-SCNC: 140 MMOL/L (ref 136–145)

## 2020-03-05 PROCEDURE — 99213 PR OFFICE/OUTPT VISIT, EST, LEVL III, 20-29 MIN: ICD-10-PCS | Mod: S$GLB,,, | Performed by: INTERNAL MEDICINE

## 2020-03-05 PROCEDURE — 99999 PR PBB SHADOW E&M-EST. PATIENT-LVL III: CPT | Mod: PBBFAC,,, | Performed by: INTERNAL MEDICINE

## 2020-03-05 PROCEDURE — 36415 COLL VENOUS BLD VENIPUNCTURE: CPT | Mod: PO

## 2020-03-05 PROCEDURE — 99213 OFFICE O/P EST LOW 20 MIN: CPT | Mod: S$GLB,,, | Performed by: INTERNAL MEDICINE

## 2020-03-05 PROCEDURE — 80048 BASIC METABOLIC PNL TOTAL CA: CPT

## 2020-03-05 PROCEDURE — 99999 PR PBB SHADOW E&M-EST. PATIENT-LVL III: ICD-10-PCS | Mod: PBBFAC,,, | Performed by: INTERNAL MEDICINE

## 2020-03-05 NOTE — PROGRESS NOTES
Subjective:    Patient ID:  Rabia Winn is a 54 y.o. female who presents for follow-up of Follow-up (follow up)      HPI54 yo WF with HTN. Had negative renal US , normal echo and unremarkable holter. Denies chest pain, SOB, or edema. Denies palpitations, weak spells, and syncope    Review of Systems   Constitution: Negative for decreased appetite, fever, malaise/fatigue, weight gain and weight loss.   HENT: Negative for hearing loss and nosebleeds.    Eyes: Negative for visual disturbance.   Cardiovascular: Negative for chest pain, claudication, cyanosis, dyspnea on exertion, irregular heartbeat, leg swelling, near-syncope, orthopnea, palpitations, paroxysmal nocturnal dyspnea and syncope.   Respiratory: Negative for cough, hemoptysis, shortness of breath, sleep disturbances due to breathing, snoring and wheezing.    Endocrine: Negative for cold intolerance, heat intolerance, polydipsia and polyuria.   Hematologic/Lymphatic: Negative for adenopathy and bleeding problem. Does not bruise/bleed easily.   Skin: Negative for color change, itching, poor wound healing, rash and suspicious lesions.   Musculoskeletal: Negative for arthritis, back pain, falls, joint pain, joint swelling, muscle cramps, muscle weakness and myalgias.   Gastrointestinal: Negative for bloating, abdominal pain, change in bowel habit, constipation, flatus, heartburn, hematemesis, hematochezia, hemorrhoids, jaundice, melena, nausea and vomiting.   Genitourinary: Negative for bladder incontinence, decreased libido, frequency, hematuria, hesitancy and urgency.   Neurological: Negative for brief paralysis, difficulty with concentration, excessive daytime sleepiness, dizziness, focal weakness, headaches, light-headedness, loss of balance, numbness, vertigo and weakness.   Psychiatric/Behavioral: Negative for altered mental status, depression and memory loss. The patient does not have insomnia and is not nervous/anxious.    Allergic/Immunologic:  "Negative for environmental allergies, hives and persistent infections.        Objective:    Physical Exam   Constitutional: She is oriented to person, place, and time. She appears well-developed and well-nourished.   /85   Pulse 71   Ht 5' 1" (1.549 m)   Wt 48.1 kg (106 lb 0.7 oz)   BMI 20.04 kg/m²      HENT:   Head: Normocephalic and atraumatic.   Right Ear: External ear normal.   Left Ear: External ear normal.   Nose: Nose normal.   Mouth/Throat: Oropharynx is clear and moist.   Eyes: Pupils are equal, round, and reactive to light. Conjunctivae, EOM and lids are normal. Right eye exhibits no discharge. Left eye exhibits no discharge. Right conjunctiva has no hemorrhage. No scleral icterus.   Neck: Normal range of motion. Neck supple. No JVD present. No tracheal deviation present. No thyromegaly present.   Cardiovascular: Normal rate, regular rhythm, normal heart sounds and intact distal pulses. Exam reveals no gallop and no friction rub.   No murmur heard.  Pulmonary/Chest: Effort normal and breath sounds normal. No respiratory distress. She has no wheezes. She has no rales. She exhibits no tenderness. Breasts are symmetrical.   Abdominal: Soft. Bowel sounds are normal. She exhibits no distension and no mass. There is no hepatosplenomegaly or hepatomegaly. There is no tenderness. There is no rebound and no guarding.   Musculoskeletal: Normal range of motion. She exhibits no edema or tenderness.   Lymphadenopathy:     She has no cervical adenopathy.   Neurological: She is alert and oriented to person, place, and time. She displays normal reflexes. No cranial nerve deficit. Coordination normal.   Skin: Skin is warm and dry. No rash noted. No erythema. No pallor.   Psychiatric: She has a normal mood and affect. Her behavior is normal. Judgment and thought content normal.   Nursing note and vitals reviewed.        Assessment:       1. Essential hypertension         Plan:     The current medical regimen is " effective;  continue present plan and medications.     Low salt      Orders Placed This Encounter   Procedures    Basic metabolic panel     Follow up in about 6 months (around 9/5/2020).

## 2020-03-11 ENCOUNTER — PATIENT OUTREACH (OUTPATIENT)
Dept: ADMINISTRATIVE | Facility: OTHER | Age: 55
End: 2020-03-11

## 2020-03-12 ENCOUNTER — TELEPHONE (OUTPATIENT)
Dept: PAIN MEDICINE | Facility: CLINIC | Age: 55
End: 2020-03-12

## 2020-03-12 NOTE — TELEPHONE ENCOUNTER
Contacted pt. Pt states Jen taylor has called pt to reschedule appt already. No further questions or concerns.//lp

## 2020-03-12 NOTE — PROGRESS NOTES
"HPI: Rabia Winn is a  54 y.o. female who was referred to me by Faith Martell NP and was seen in consultation today for left 4th toe pain. The pain began acutely on 2/17/20 when she kicked a door frame accidentally. She rates her pain as 7/10 today.        PAST MEDICAL/SURGICAL/FAMILY/SOCIAL/ HISTORY: REVIEWED    ALLERGIES/MEDICATIONS: REVIEWED       Review of Systems:     Constitution: Negative.   HEENT: Negative.   Eyes: Negative.   Cardiovascular: Negative.   Respiratory: Negative.   Endocrine: Negative.   Hematologic/Lymphatic: Negative.   Skin: Negative.   Musculoskeletal: Positive for left 4th toe pain   Gastrointestinal: Negative.   Genitourinary: Negative.   Neurological: Negative.   Psychiatric/Behavioral: Negative.   Allergic/Immunologic: Negative.       PHYSICAL EXAM:  Vitals:    03/03/20 1316   BP: (!) 134/90   Pulse: 66     Ht Readings from Last 1 Encounters:   03/05/20 5' 1" (1.549 m)     Wt Readings from Last 1 Encounters:   03/05/20 48.1 kg (106 lb 0.7 oz)       GENERAL: Well developed, well nourished, no acute distress.  SKIN: Skin is intact. No atrophy, abrasions or lesions are noted.   Neurological: Normal mental status. Appropriate and conversant. Alert and oriented x 3.  GAIT: Walks with an antalgic gait.    Left  lower extremity compared with RLE:  2+ dorsalis pedis pulse.  Capillary refill < 3 seconds.  Normal range of motion tibiotalar and subtalar joints. Normal alignment of the forefoot and the hindfoot.  5/5 strength EHL, FHL, tibialis anterior, gastrocsoleus, tibialis posterior and peroneals. Sensation to light touch intact sural, saphenous, superficial peroneal and deep peroneal nerves. + swelling, ecchymosis nad  tenderness to palpation of the 4th toe, no deformity. No lymphadenopathy, no masses or tumors palpated.      XRAYS:   3 views of left foot obtained and reviewed today reveal non-displaced P1 frx of the 4th toe.       ASSESSMENT:        Encounter Diagnosis   Name " Primary?    Closed displaced fracture of proximal phalanx of lesser toe of left foot, initial encounter Yes        PLAN:   Non-operative treatment, buddy tape toe prn, ARest and elevate the affected painful area.  Apply cold compresses intermittently as needed.  As pain recedes, begin normal activities slowly as tolerated.

## 2020-03-12 NOTE — TELEPHONE ENCOUNTER
----- Message from Hafsa Hess sent at 3/12/2020  3:10 PM CDT -----  Contact: pt  Type:  Patient Returning Call    Who Called:  Pt  Does the patient know what this is regarding?:  appt for tomorrow  Best Call Back Number:    Additional Information:  Please give pt a call back as soon as possible. Thank you

## 2020-03-17 ENCOUNTER — PATIENT OUTREACH (OUTPATIENT)
Dept: ADMINISTRATIVE | Facility: OTHER | Age: 55
End: 2020-03-17

## 2020-03-17 ENCOUNTER — TELEPHONE (OUTPATIENT)
Dept: ORTHOPEDICS | Facility: CLINIC | Age: 55
End: 2020-03-17

## 2020-03-17 NOTE — TELEPHONE ENCOUNTER
Spoke to patient. Instructed per Dr. King elevated foot, apply ice, take Tylenol as instructed on bottle for pain, rest as much as possible. Patient to call back next week if still painful.

## 2020-03-20 ENCOUNTER — TELEPHONE (OUTPATIENT)
Dept: PAIN MEDICINE | Facility: CLINIC | Age: 55
End: 2020-03-20

## 2020-03-20 ENCOUNTER — PATIENT MESSAGE (OUTPATIENT)
Dept: FAMILY MEDICINE | Facility: CLINIC | Age: 55
End: 2020-03-20

## 2020-03-20 DIAGNOSIS — M79.10 MYALGIA: ICD-10-CM

## 2020-03-20 DIAGNOSIS — M47.812 CERVICAL SPONDYLOSIS: ICD-10-CM

## 2020-03-20 DIAGNOSIS — M47.812 SPONDYLOSIS OF CERVICAL REGION WITHOUT MYELOPATHY OR RADICULOPATHY: ICD-10-CM

## 2020-03-20 DIAGNOSIS — M54.16 LUMBAR RADICULOPATHY: ICD-10-CM

## 2020-03-20 RX ORDER — BACLOFEN 10 MG/1
10 TABLET ORAL 2 TIMES DAILY PRN
Qty: 60 TABLET | Refills: 0 | Status: SHIPPED | OUTPATIENT
Start: 2020-03-20 | End: 2020-04-14 | Stop reason: SDUPTHER

## 2020-03-20 NOTE — TELEPHONE ENCOUNTER
I spoke with Ms. Winn on the phone.  She reports about 75% of her pain and improvement in her ROM.  She has improved mobility and ability to do ADL's.  I will refill baclofen, and we will have her follow up in the office as needed for repeat botox.

## 2020-04-06 ENCOUNTER — PATIENT MESSAGE (OUTPATIENT)
Dept: NEUROLOGY | Facility: CLINIC | Age: 55
End: 2020-04-06

## 2020-04-14 DIAGNOSIS — M47.812 CERVICAL SPONDYLOSIS: ICD-10-CM

## 2020-04-14 DIAGNOSIS — M54.16 LUMBAR RADICULOPATHY: ICD-10-CM

## 2020-04-14 DIAGNOSIS — M47.812 SPONDYLOSIS OF CERVICAL REGION WITHOUT MYELOPATHY OR RADICULOPATHY: ICD-10-CM

## 2020-04-14 DIAGNOSIS — M79.10 MYALGIA: ICD-10-CM

## 2020-04-15 RX ORDER — BACLOFEN 10 MG/1
10 TABLET ORAL 2 TIMES DAILY PRN
Qty: 60 TABLET | Refills: 2 | Status: SHIPPED | OUTPATIENT
Start: 2020-04-15 | End: 2021-03-02 | Stop reason: SDUPTHER

## 2020-04-29 ENCOUNTER — TELEPHONE (OUTPATIENT)
Dept: PAIN MEDICINE | Facility: CLINIC | Age: 55
End: 2020-04-29

## 2020-04-29 NOTE — TELEPHONE ENCOUNTER
----- Message from Devon Willett sent at 4/29/2020  2:09 PM CDT -----  Contact: Ptnt [phone]  Type: Needs Medical Advice    Who Called:     Additional Information:    Advised returning a call to the office to Katerine about rescheduling her appmnt. Please call.

## 2020-04-30 ENCOUNTER — PATIENT OUTREACH (OUTPATIENT)
Dept: ADMINISTRATIVE | Facility: OTHER | Age: 55
End: 2020-04-30

## 2020-05-04 ENCOUNTER — OFFICE VISIT (OUTPATIENT)
Dept: PAIN MEDICINE | Facility: CLINIC | Age: 55
End: 2020-05-04
Payer: COMMERCIAL

## 2020-05-04 DIAGNOSIS — G24.3 CERVICAL DYSTONIA: Primary | ICD-10-CM

## 2020-05-04 PROCEDURE — 99441 PR PHYSICIAN TELEPHONE EVALUATION 5-10 MIN: ICD-10-PCS | Mod: 95,,, | Performed by: ANESTHESIOLOGY

## 2020-05-04 PROCEDURE — 99441 PR PHYSICIAN TELEPHONE EVALUATION 5-10 MIN: CPT | Mod: 95,,, | Performed by: ANESTHESIOLOGY
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
## 2020-05-05 ENCOUNTER — PATIENT MESSAGE (OUTPATIENT)
Dept: ADMINISTRATIVE | Facility: HOSPITAL | Age: 55
End: 2020-05-05

## 2020-05-05 ENCOUNTER — PATIENT MESSAGE (OUTPATIENT)
Dept: ORTHOPEDICS | Facility: CLINIC | Age: 55
End: 2020-05-05

## 2020-05-06 ENCOUNTER — TELEPHONE (OUTPATIENT)
Dept: PAIN MEDICINE | Facility: CLINIC | Age: 55
End: 2020-05-06

## 2020-05-06 DIAGNOSIS — M47.812 SPONDYLOSIS OF CERVICAL REGION WITHOUT MYELOPATHY OR RADICULOPATHY: ICD-10-CM

## 2020-05-06 NOTE — TELEPHONE ENCOUNTER
----- Message from Isaura Guzman sent at 5/6/2020 11:44 AM CDT -----  Type:  RX Refill Request    Who Called:  Alicia rao/YAUN  Refill or New Rx:  refill  RX Name and Strength:  Celebrex  How is the patient currently taking it? (ex. 1XDay):  1 x day  Is this a 30 day or 90 day RX:  30  Preferred Pharmacy with phone number:    [id number] - [place] - [address]  [address]  [place] [address]  Phone: [phone] Fax: [phone]  Local or Mail Order:  local  Ordering Provider:  Charlie Dominique Call Back Number:  [phone]  Additional Information:

## 2020-05-08 ENCOUNTER — TELEPHONE (OUTPATIENT)
Dept: PAIN MEDICINE | Facility: CLINIC | Age: 55
End: 2020-05-08

## 2020-05-08 DIAGNOSIS — G24.3 CERVICAL DYSTONIA: Primary | ICD-10-CM

## 2020-05-08 RX ORDER — CELECOXIB 200 MG/1
200 CAPSULE ORAL DAILY PRN
Qty: 30 CAPSULE | Refills: 0 | Status: SHIPPED | OUTPATIENT
Start: 2020-05-08 | End: 2022-11-19

## 2020-05-08 NOTE — TELEPHONE ENCOUNTER
Spoke with patient and advised that she is scheduled for botox appointment next week and I will let her know when this is approved.

## 2020-05-08 NOTE — TELEPHONE ENCOUNTER
----- Message from Charlie Bowles MD sent at 5/4/2020  1:41 PM CDT -----  Please get Ms. Winn scheduled for botox injection in the office.  It will be for 20 units to the right trapezius.

## 2020-05-12 ENCOUNTER — PATIENT OUTREACH (OUTPATIENT)
Dept: ADMINISTRATIVE | Facility: OTHER | Age: 55
End: 2020-05-12

## 2020-05-12 DIAGNOSIS — F51.01 PRIMARY INSOMNIA: ICD-10-CM

## 2020-05-13 ENCOUNTER — TELEPHONE (OUTPATIENT)
Dept: ORTHOPEDICS | Facility: CLINIC | Age: 55
End: 2020-05-13

## 2020-05-13 DIAGNOSIS — M79.672 LEFT FOOT PAIN: Primary | ICD-10-CM

## 2020-05-13 RX ORDER — ZOLPIDEM TARTRATE 5 MG/1
5 TABLET ORAL NIGHTLY PRN
Qty: 30 TABLET | Refills: 1 | Status: SHIPPED | OUTPATIENT
Start: 2020-05-13 | End: 2020-07-14

## 2020-05-13 NOTE — TELEPHONE ENCOUNTER
Refill Routing Note     Medication(s) are not appropriate for processing by [facility]:    Medication Outside of Protocol    Appointments  past 12m or future 3m with PCP    Date Provider   Last Visit   10/4/2019 ANN MARIE Fischer MD   Next Visit   Visit date not found ANN MARIE Fischer MD           Automatic Epic Protocol Generated Data:    Requested Prescriptions   Pending Prescriptions Disp Refills    zolpidem (AMBIEN) 5 MG Tab [Pharmacy Med Name: ZOLPIDEM TARTRATE 5 MG TABLET] 30 tablet 2     Sig: TAKE 1 TABLET (5 MG TOTAL) BY MOUTH NIGHTLY AS NEEDED.       There is no refill protocol information for this order           Note composed:9:10 PM 05/12/2020

## 2020-05-13 NOTE — PROGRESS NOTES
Chart reviewed.   Immunizations: Triggered Imm Registry     Orders placed: n/a  Upcoming appts to satisfy TUCKER topics: n/a

## 2020-05-13 NOTE — TELEPHONE ENCOUNTER
Called pt. Pt scheduled appt with Ms. MartellNYLA,NPC today for foot pain. Per Ms. Martell, pt needs to follow up with Dr. King for that concern. Rescheduled appt to Dr. King tomorrow. Thanks, Tara

## 2020-05-14 ENCOUNTER — OFFICE VISIT (OUTPATIENT)
Dept: ORTHOPEDICS | Facility: CLINIC | Age: 55
End: 2020-05-14
Payer: COMMERCIAL

## 2020-05-14 ENCOUNTER — HOSPITAL ENCOUNTER (OUTPATIENT)
Dept: RADIOLOGY | Facility: HOSPITAL | Age: 55
Discharge: HOME | End: 2020-05-14
Attending: ORTHOPAEDIC SURGERY
Payer: COMMERCIAL

## 2020-05-14 VITALS
HEIGHT: 61 IN | SYSTOLIC BLOOD PRESSURE: 138 MMHG | DIASTOLIC BLOOD PRESSURE: 89 MMHG | TEMPERATURE: 98 F | BODY MASS INDEX: 20.02 KG/M2 | HEART RATE: 78 BPM | WEIGHT: 106.06 LBS

## 2020-05-14 DIAGNOSIS — S92.515D CLOSED NONDISPLACED FRACTURE OF PROXIMAL PHALANX OF LESSER TOE OF LEFT FOOT WITH ROUTINE HEALING: Primary | ICD-10-CM

## 2020-05-14 DIAGNOSIS — M79.672 LEFT FOOT PAIN: ICD-10-CM

## 2020-05-14 PROCEDURE — 99214 PR OFFICE/OUTPT VISIT, EST, LEVL IV, 30-39 MIN: ICD-10-PCS | Mod: S$GLB,,, | Performed by: ORTHOPAEDIC SURGERY

## 2020-05-14 PROCEDURE — 73630 XR FOOT COMPLETE 3 VIEW LEFT: ICD-10-PCS | Mod: 26,LT,, | Performed by: RADIOLOGY

## 2020-05-14 PROCEDURE — 73630 X-RAY EXAM OF FOOT: CPT | Mod: 26,LT,, | Performed by: RADIOLOGY

## 2020-05-14 PROCEDURE — 73630 X-RAY EXAM OF FOOT: CPT | Mod: TC,PO,LT

## 2020-05-14 PROCEDURE — 99999 PR PBB SHADOW E&M-EST. PATIENT-LVL III: ICD-10-PCS | Mod: PBBFAC,,, | Performed by: ORTHOPAEDIC SURGERY

## 2020-05-14 PROCEDURE — 99999 PR PBB SHADOW E&M-EST. PATIENT-LVL III: CPT | Mod: PBBFAC,,, | Performed by: ORTHOPAEDIC SURGERY

## 2020-05-14 PROCEDURE — 99214 OFFICE O/P EST MOD 30 MIN: CPT | Mod: S$GLB,,, | Performed by: ORTHOPAEDIC SURGERY

## 2020-05-14 NOTE — PROGRESS NOTES
"HPI: Rabia Winn is a  54 y.o. female who was seen for f/u today for left 4th toe pain. The pain began acutely on 2/17/20 when she kicked a door frame accidentally. She rates her pain as 4/10 today.      She say the pain is improving but it still swells and is painful at night. She also says her  pulled her toe once and she caught it on a chair so that increased the pain.     PAST MEDICAL/SURGICAL/FAMILY/SOCIAL/ HISTORY: REVIEWED    ALLERGIES/MEDICATIONS: REVIEWED       Review of Systems:     Constitution: Negative.   HEENT: Negative.   Eyes: Negative.   Cardiovascular: Negative.   Respiratory: Negative.   Endocrine: Negative.   Hematologic/Lymphatic: Negative.   Skin: Negative.   Musculoskeletal: Positive for left 4th toe pain   Gastrointestinal: Negative.   Genitourinary: Negative.   Neurological: Negative.   Psychiatric/Behavioral: Negative.   Allergic/Immunologic: Negative.       PHYSICAL EXAM:  Vitals:    05/14/20 1353   BP: 138/89   Pulse: 78   Temp: 98.3 °F (36.8 °C)     Ht Readings from Last 1 Encounters:   05/14/20 5' 1" (1.549 m)     Wt Readings from Last 1 Encounters:   05/14/20 48.1 kg (106 lb 0.7 oz)       GENERAL: Well developed, well nourished, no acute distress.  SKIN: Skin is intact. No atrophy, abrasions or lesions are noted.   Neurological: Normal mental status. Appropriate and conversant. Alert and oriented x 3.  GAIT: Walks with an antalgic gait.    Left  lower extremity compared with RLE:  neurovascularly intact,  Mild swelling, no ecchymosis, minimal tenderness to palpation of the 4th toe, no deformity. No lymphadenopathy, no masses or tumors palpated.  Walking well in a tennis shoe.     XRAYS:   3 views of left foot obtained and reviewed today reveal non-displaced P1 frx of the 4th toe. It is almost completely healed.       ASSESSMENT: Left 4th toe fracture       PLAN:   I reassured her that swelling in the foot and ankle can last 6 months to a year and her fracture is almost " completely healed. Return to full activities, no restrictions.

## 2020-05-15 ENCOUNTER — PROCEDURE VISIT (OUTPATIENT)
Dept: PAIN MEDICINE | Facility: CLINIC | Age: 55
End: 2020-05-15
Payer: COMMERCIAL

## 2020-05-15 VITALS
TEMPERATURE: 98 F | DIASTOLIC BLOOD PRESSURE: 85 MMHG | WEIGHT: 106.06 LBS | HEART RATE: 69 BPM | BODY MASS INDEX: 20.02 KG/M2 | HEIGHT: 61 IN | SYSTOLIC BLOOD PRESSURE: 135 MMHG

## 2020-05-15 DIAGNOSIS — G24.3 CERVICAL DYSTONIA: Primary | ICD-10-CM

## 2020-05-15 NOTE — PROCEDURES
Procedure: Botox injection to right trapezius muscle  Date 5/15/2020  Pre op Diagnosis: cervical dystonia  Post op Diagnosis: Same  Lot # [id number]  Total units given: 20U  Total units wasted: 80U    Technique: Risks of the procedure were explained to the patient, informed consent was then signed.  Right trapezius region was prepped with alcohol.  Using sterile technique, after mixing a total of 100 units of onabutilin toxin A in 2 cc of normal saline for a concentration of 5 units per 0.1cc, a 30-gauge 1 inch needle was used for injecting the following muscles with corresponding units: right trapezius 4 locations, 5 units each total of 20 units. With each injection, aspiration was negative for blood or air.  Patient tolerated the procedure well. She was observed for 15 minutes before he left our office, given postoperative instructions to call for any side effects or complications.  She will follow up in the office in 6-8 weeks.

## 2020-07-14 DIAGNOSIS — F51.01 PRIMARY INSOMNIA: ICD-10-CM

## 2020-07-14 RX ORDER — ZOLPIDEM TARTRATE 5 MG/1
5 TABLET ORAL NIGHTLY PRN
Qty: 30 TABLET | Refills: 1 | Status: SHIPPED | OUTPATIENT
Start: 2020-07-14 | End: 2020-09-17

## 2020-07-14 NOTE — PROGRESS NOTES
Refill Routing Note   Medication(s) are not appropriate for processing by [facility]:       - Outside of protocol           Medication reconciliation completed: No      Automatic Epic Generated Protocol Data:    Requested Prescriptions   Pending Prescriptions Disp Refills    zolpidem (AMBIEN) 5 MG Tab [Pharmacy Med Name: ZOLPIDEM TARTRATE 5 MG TABLET] 30 tablet 1     Sig: TAKE 1 TABLET (5 MG TOTAL) BY MOUTH NIGHTLY AS NEEDED.       There is no refill protocol information for this order           Appointments  past 12m or future 3m with PCP    Date Provider   Last Visit   10/4/2019 ANN MARIE Fischer MD   Next Visit   Visit date not found ANN MARIE Fischer MD   ED visits in past 90 days: 0     Note composed:11:54 AM 07/14/2020

## 2020-07-27 ENCOUNTER — PATIENT OUTREACH (OUTPATIENT)
Dept: ADMINISTRATIVE | Facility: OTHER | Age: 55
End: 2020-07-27

## 2020-07-27 NOTE — PROGRESS NOTES
Subjective:      Patient ID: Rabia Winn is a 54 y.o. female.    Chief Complaint: Foot Problem (had neck sx in feb 19, and has developed foot problems since), Foot Pain (both feet throbbing since sx), and Bunions      HPI:  Rabia Winn is a 54 y.o. female who presents to clinic with a chief complaint of throbbing pain in both feet.  Patient reports throbbing pain is been present since her neck surgery on 2/5/19.  She states that her legs have become more swollen since her surgery and inquires if they are actually related.  She does admit to low back pain, which significantly worsened after her neck surgery.  She rates her pain as 7/10 on the pain scale and relates that waxes and wanes throughout the day.  She denies trying to self treat.  Patient denies any other pedal complaints at this time.    PCP:   MALLORY Fischer MD  Date last seen: 10/4/2019    Review of Systems   Constitutional: Negative for appetite change, fever, chills, fatigue and unexpected weight change.   Respiratory: Negative for cough, wheezing, and shortness of breath.   Cardiovascular: Negative for chest pain, claudication, cyanosis.  Positive for leg swelling.  Endocrine:  Negative for intolerance to cold, intolerance to heat, polydipsia, polyphagia, and polyuria.    Gastrointestinal: Negative for nausea, vomiting, diarrhea, and constipation.   Musculoskeletal:  Positive for back pain, arthritis, joint pain, joint swelling, myalgias, and stiffness.   Skin: Negative for nail bed changes, discoloration, rash, itching, poor wound healing, suspicious lesion.  Positive for unusual hair distribution.   Neurological: Negative for loss of balance, sensory change, paresthesias, and numbness.  Positive for pain.  Hematological: Negative for adenopathy, bleeding, and bruising easily.   Psychiatric/Behavioral: The patient is not nervous/anxious.  Negative for altered mental status.    No results found for: HGBA1C    Past Medical History:    Diagnosis Date    Arthritis     Asthma     DDD (degenerative disc disease), cervical     DDD (degenerative disc disease), lumbar     Epilepsy     HTN (hypertension)      Past Surgical History:   Procedure Laterality Date    ANTERIOR CERVICAL DISCECTOMY W/ FUSION N/A 2/5/2019    Procedure: DISCECTOMY, SPINE, CERVICAL, ANTERIOR APPROACH, WITH FUSION C3-4, 4-5, 5-6, 6-7;  Surgeon: Jesus Kerr MD;  Location: [address];  Service: Neurosurgery;  Laterality: N/A;    GANGLION CYST EXCISION Right     right breast    HYSTERECTOMY      INJECTION OF ANESTHETIC AGENT AROUND MEDIAL BRANCH NERVES INNERVATING CERVICAL FACET JOINT Bilateral 12/19/2018    Procedure: Block-nerve-medial branch-cervical C3-6;  Surgeon: Charlie Bowles MD;  Location: [facility];  Service: Pain Management;  Laterality: Bilateral;     Family History   Problem Relation Age of Onset    Cancer Mother      Social History     Socioeconomic History    Marital status:      Spouse name: Not on file    Number of children: Not on file    Years of education: Not on file    Highest education level: Not on file   Occupational History    Not on file   Social Needs    Financial resource strain: Not on file    Food insecurity     Worry: Not on file     Inability: Not on file    Transportation needs     Medical: Not on file     Non-medical: Not on file   Tobacco Use    Smoking status: Never Smoker    Smokeless tobacco: Never Used   Substance and Sexual Activity    Alcohol use: Yes     Comment: rarely    Drug use: No    Sexual activity: Yes     Partners: Male   Lifestyle    Physical activity     Days per week: Not on file     Minutes per session: Not on file    Stress: Not on file   Relationships    Social connections     Talks on phone: Not on file     Gets together: Not on file     Attends [employer] service: Not on file     Active member of club or organization: Not on file     Attends meetings of clubs or organizations: Not on  "file     Relationship status: Not on file   Other Topics Concern    Not on file   Social History Narrative    Not on file           Objective:        Ht 5' 1" (1.549 m)   Wt 48.1 kg (106 lb)   BMI 20.03 kg/m²     Physical Exam   Constitutional: Patient is oriented to person, place, and time. Patient appears well-developed and well-nourished. No acute distress.     Psychiatric: Patient has a normal mood and affect. Patient's speech is normal and behavior is normal. Judgment is normal. Cognition and memory are normal.     Bilateral pedal exam was performed today.  Vascular: Pedal pulses palpable 2/4 DP & PT.  CFT is > 3 seconds to the hallux.  Skin temperature is warm to cool proximal tibia to distal toes without localized increase in calor noted.  No erythema or ecchymosis noted to the foot or ankle.  Hair growth decreased distally to the LE.  Nonpitting edema noted to the LE.     Musculoskeletal: Ankle joint ROM is decreased. Subtalar joint ROM is decreased.  Midtarsal joint ROM is decreased.  1st ray ROM is within normal limits.  1st MTPJ ROM is decreased.  Ankle joint dorsiflexion is restricted with the knee extended and flexed per Silfverskiold exam.  Muscle strength is 5/5 for all LE muscle groups tested.  The hallux is abducted on the 1st ray.  The 5th toe is adducted on the 5th ray.  Flexion contracture of lesser digits is appreciable.    Neurological: Protective sensation is intact to 10/10 sites on the bilateral foot via Tol[place]-Balaji monofilament.    Proprioception is Intact to the foot.  Vibratory sensation is Decreased to the foot.   Light touch sensation is Intact to the foot.   Achilles DTR and Chaddock STR are Intact to bilateral lower extremities.   Negative Babinski sign bilateral lower extremities.  Negative clonus sign bilateral lower extremities.  Tenderness to palpation noted to B/L plantar forefoot diffusely.    Dermatological: Toenails 1-5 bilateral are WNL in length and thickness.  " Webspaces 1-4 bilateral are clean, dry, and intact.  Skin turgor is increased.  No dry, flaky skin noted to the LE.  No open wounds or suspicious pigmented lesions appreciable to the foot or ankle.    Nursing note and vitals reviewed.        Assessment:       Encounter Diagnoses   Name Primary?    Metatarsalgia of both feet Yes    Hammer toes of both feet     Valgus deformity of both great toes     Tailor's bunion of both feet     Peripheral vascular disease          Plan:       Rabia was seen today for foot problem, foot pain and bunions.    Diagnoses and all orders for this visit:    Metatarsalgia of both feet    Hammer toes of both feet    Valgus deformity of both great toes    Tailor's bunion of both feet    Peripheral vascular disease  -     Cancel: US Lower Extremity Veins Bilateral Insuf; Future  -     US Lower Extremity Veins Bilateral Insuf; Future      I counseled the patient on her conditions, their implications and medical management.    - Reviewed xrays from 5/13/20 with patient.    - Recommended ultrasound of B/L LE due to edema and possible Baker's cyst.  Informed patient that she should follow up with orthopedics for right knee and lumbar pain.  Advised patient that an EMG-NCV of the LE would be the next step if conservative therapy doesn't resolve pain and ultrasound is negative.    - Educated patient on proper foot care, compression socks, supportive/accommodative shoe gear, and PRICE therapy.    Patient was given the following recommendations and instructions:  Patient Instructions   - Wear supportive shoes such as sneakers with arch supports.    - Look for Superfeet, SofSole, or Powerstep arch supports or [employer] brand cushioned insoles.    - Wear over-the-counter compression socks at all times when ambulating.  Do not wear them while resting for prolonged periods of time such as when sleeping.  Look for Jobst brand compression socks 15-20 mmHg.  Prefer Sport style but travel and  regular are okay.    - Rest/reduce ambulation to necessary activities of daily living.    - Ice behind knee for no more than 20 minutes/per hour.    - Elevate foot as much as possible throughout the day.    - Apply lidocaine cream or Voltaren gel to feet as needed for pain relief.    - Notify clinic if pain worsens or fails to improve.                          Yessenia Peña DPM        Dictation was performed using M*Modal Fluency.  Transcription errors may be present.

## 2020-07-27 NOTE — PROGRESS NOTES
Requested updates within Care Everywhere.  Patient's chart was reviewed for overdue TUCKER topics.  Immunizations reconciled.

## 2020-07-28 ENCOUNTER — OFFICE VISIT (OUTPATIENT)
Dept: PODIATRY | Facility: CLINIC | Age: 55
End: 2020-07-28
Payer: COMMERCIAL

## 2020-07-28 VITALS — HEIGHT: 61 IN | BODY MASS INDEX: 20.01 KG/M2 | WEIGHT: 106 LBS

## 2020-07-28 DIAGNOSIS — M20.11 VALGUS DEFORMITY OF BOTH GREAT TOES: ICD-10-CM

## 2020-07-28 DIAGNOSIS — M20.41 HAMMER TOES OF BOTH FEET: ICD-10-CM

## 2020-07-28 DIAGNOSIS — M77.42 METATARSALGIA OF BOTH FEET: Primary | ICD-10-CM

## 2020-07-28 DIAGNOSIS — M77.41 METATARSALGIA OF BOTH FEET: Primary | ICD-10-CM

## 2020-07-28 DIAGNOSIS — M21.621 TAILOR'S BUNION OF BOTH FEET: ICD-10-CM

## 2020-07-28 DIAGNOSIS — M21.622 TAILOR'S BUNION OF BOTH FEET: ICD-10-CM

## 2020-07-28 DIAGNOSIS — I73.9 PERIPHERAL VASCULAR DISEASE: ICD-10-CM

## 2020-07-28 DIAGNOSIS — M20.12 VALGUS DEFORMITY OF BOTH GREAT TOES: ICD-10-CM

## 2020-07-28 DIAGNOSIS — M20.42 HAMMER TOES OF BOTH FEET: ICD-10-CM

## 2020-07-28 PROCEDURE — 99204 OFFICE O/P NEW MOD 45 MIN: CPT | Mod: S$GLB,,, | Performed by: PODIATRIST

## 2020-07-28 PROCEDURE — 99999 PR PBB SHADOW E&M-EST. PATIENT-LVL III: CPT | Mod: PBBFAC,,, | Performed by: PODIATRIST

## 2020-07-28 PROCEDURE — 99204 PR OFFICE/OUTPT VISIT, NEW, LEVL IV, 45-59 MIN: ICD-10-PCS | Mod: S$GLB,,, | Performed by: PODIATRIST

## 2020-07-28 PROCEDURE — 99999 PR PBB SHADOW E&M-EST. PATIENT-LVL III: ICD-10-PCS | Mod: PBBFAC,,, | Performed by: PODIATRIST

## 2020-07-28 RX ORDER — MONTELUKAST SODIUM 10 MG/1
10 TABLET ORAL DAILY
COMMUNITY
Start: 2020-06-16

## 2020-07-28 NOTE — PATIENT INSTRUCTIONS
- Wear supportive shoes such as sneakers with arch supports.    - Look for Superfeet, SofSole, or Powerstep arch supports or [employer] brand cushioned insoles.    - Wear over-the-counter compression socks at all times when ambulating.  Do not wear them while resting for prolonged periods of time such as when sleeping.  Look for Jobst brand compression socks 15-20 mmHg.  Prefer Sport style but travel and regular are okay.    - Rest/reduce ambulation to necessary activities of daily living.    - Ice behind knee for no more than 20 minutes/per hour.    - Elevate foot as much as possible throughout the day.    - Apply lidocaine cream or Voltaren gel to feet as needed for pain relief.    - Notify clinic if pain worsens or fails to improve.

## 2020-07-31 ENCOUNTER — HOSPITAL ENCOUNTER (OUTPATIENT)
Dept: RADIOLOGY | Facility: HOSPITAL | Age: 55
Discharge: HOME | End: 2020-07-31
Attending: PODIATRIST
Payer: COMMERCIAL

## 2020-07-31 DIAGNOSIS — I73.9 PERIPHERAL VASCULAR DISEASE: ICD-10-CM

## 2020-07-31 PROCEDURE — 93970 EXTREMITY STUDY: CPT | Mod: 26,,, | Performed by: RADIOLOGY

## 2020-07-31 PROCEDURE — 93970 EXTREMITY STUDY: CPT | Mod: TC,PO

## 2020-07-31 PROCEDURE — 93970 US LOWER EXTREMITY VEINS BILATERAL INSUFFICIENCY: ICD-10-PCS | Mod: 26,,, | Performed by: RADIOLOGY

## 2020-08-09 ENCOUNTER — PATIENT OUTREACH (OUTPATIENT)
Dept: ADMINISTRATIVE | Facility: OTHER | Age: 55
End: 2020-08-09

## 2020-09-16 DIAGNOSIS — F51.01 PRIMARY INSOMNIA: ICD-10-CM

## 2020-09-17 RX ORDER — ZOLPIDEM TARTRATE 5 MG/1
5 TABLET ORAL NIGHTLY PRN
Qty: 30 TABLET | Refills: 0 | Status: SHIPPED | OUTPATIENT
Start: 2020-09-17 | End: 2022-08-18

## 2020-09-17 NOTE — PROGRESS NOTES
Refill Routing Note   Medication(s) are not appropriate for processing by [facility] for the following reason(s)   - Outside of Protocol    Medication reconciliation completed: No   Automatic Epic Protocol Generated Data:    Requested Prescriptions   Pending Prescriptions Disp Refills    zolpidem (AMBIEN) 5 MG Tab [Pharmacy Med Name: ZOLPIDEM TARTRATE 5 MG TABLET] 30 tablet 1     Sig: TAKE 1 TABLET (5 MG TOTAL) BY MOUTH NIGHTLY AS NEEDED.       There is no refill protocol information for this order           Appointments     Date Provider   Last Visit   10/4/2019 ANN MARIE Fischer MD   Next Visit   Visit date not found ANN MARIE Fischer MD   ED visits in past 90 days: 0    Note composed:7:25 PM 09/16/2020

## 2020-09-23 ENCOUNTER — PATIENT MESSAGE (OUTPATIENT)
Dept: RESEARCH | Facility: OTHER | Age: 55
End: 2020-09-23

## 2020-09-29 ENCOUNTER — PATIENT OUTREACH (OUTPATIENT)
Dept: ADMINISTRATIVE | Facility: OTHER | Age: 55
End: 2020-09-29

## 2020-09-29 NOTE — PROGRESS NOTES
Health Maintenance Due   Topic Date Due    TETANUS VACCINE  10[birth year]14/1981    Shingles Vaccine (1 of 2) 10/14/2013    Influenza Vaccine (1) 08/01/2020     Updates were requested from care everywhere.  Chart was reviewed for overdue Proactive [facility] ([facility]) topics (CRS, Breast Cancer Screening, Eye exam)  Health Maintenance has been updated.  LINKS immunization registry triggered.  Immunizations were reconciled.

## 2020-09-30 ENCOUNTER — TELEPHONE (OUTPATIENT)
Dept: PAIN MEDICINE | Facility: CLINIC | Age: 55
End: 2020-09-30

## 2020-09-30 ENCOUNTER — PROCEDURE VISIT (OUTPATIENT)
Dept: PAIN MEDICINE | Facility: CLINIC | Age: 55
End: 2020-09-30
Payer: COMMERCIAL

## 2020-09-30 VITALS
DIASTOLIC BLOOD PRESSURE: 87 MMHG | BODY MASS INDEX: 19.74 KG/M2 | WEIGHT: 104.5 LBS | RESPIRATION RATE: 18 BRPM | SYSTOLIC BLOOD PRESSURE: 156 MMHG | OXYGEN SATURATION: 100 % | HEART RATE: 65 BPM | TEMPERATURE: 97 F

## 2020-09-30 DIAGNOSIS — G24.3 CERVICAL DYSTONIA: Primary | ICD-10-CM

## 2020-09-30 NOTE — PROCEDURES
Procedure: Botox injection to right trapezius muscle  Date 9/30/20  Pre op Diagnosis: cervical dystonia  Post op Diagnosis: Same  Lot # [id number]  Total units given: 20U  Total units wasted: 80U     Technique: Risks of the procedure were explained to the patient, informed consent was then signed.  Right trapezius region was prepped with alcohol.  Using sterile technique, after mixing a total of 100 units of onabutilin toxin A in 2 cc of normal saline for a concentration of 5 units per 0.1cc, a 30-gauge 1 inch needle was used for injecting the following muscles with corresponding units: right trapezius 4 locations, 5 units each total of 20 units. With each injection, aspiration was negative for blood or air.  Patient tolerated the procedure well. She was observed for 15 minutes before he left our office, given postoperative instructions to call for any side effects or complications.  She will follow up in the office in 6-8 weeks.

## 2020-09-30 NOTE — TELEPHONE ENCOUNTER
----- Message from Ronald Godinez sent at 9/28/2020  2:05 PM CDT -----  Contact: patient  Patient called in and stated she went thru her portal & made her Botox injection appointment for this Wednesday 9/30/20 & wanted to make sure that was Ok with office?    Patient would like a call back at [phone]

## 2020-10-05 ENCOUNTER — PATIENT MESSAGE (OUTPATIENT)
Dept: ADMINISTRATIVE | Facility: HOSPITAL | Age: 55
End: 2020-10-05

## 2020-10-19 DIAGNOSIS — F51.01 PRIMARY INSOMNIA: ICD-10-CM

## 2020-10-19 NOTE — PROGRESS NOTES
Refill Routing Note   Medication(s) are not appropriate for processing by [facility] for the following reason(s):     - Outside of protocol    [facility] actions taken in this encounter: Route          Medication reconciliation completed: No   Automatic Epic Generated Protocol Data:        Requested Prescriptions   Pending Prescriptions Disp Refills    zolpidem (AMBIEN) 5 MG Tab [Pharmacy Med Name: ZOLPIDEM TARTRATE 5 MG TABLET] 30 tablet 1     Sig: TAKE 1 TABLET (5 MG TOTAL) BY MOUTH NIGHTLY AS NEEDED.       There is no refill protocol information for this order           Appointments  past 12m or future 3m with PCP    Date Provider   Last Visit   10/4/2019 ANN MARIE Fischer MD   Next Visit   Visit date not found ANN MARIE Fischer MD   ED visits in past 90 days: 0        Note composed:4:10 PM 10/19/2020

## 2020-10-20 RX ORDER — ZOLPIDEM TARTRATE 5 MG/1
5 TABLET ORAL NIGHTLY PRN
Qty: 30 TABLET | Refills: 1 | OUTPATIENT
Start: 2020-10-20 | End: 2021-04-20

## 2020-10-22 NOTE — TELEPHONE ENCOUNTER
Provider Staff:     Action required for this patient.    Please note denial of medication.   Refused by:  ANN MARIE Fischer MD  Refusal reason: Patient needs an appointment      Thanks!  [facility]   Note composed: 10/21/2020 7:54 PM
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
## 2020-11-25 ENCOUNTER — PATIENT OUTREACH (OUTPATIENT)
Dept: ADMINISTRATIVE | Facility: OTHER | Age: 55
End: 2020-11-25

## 2020-11-25 DIAGNOSIS — Z12.11 ENCOUNTER FOR FECAL IMMUNOCHEMICAL TEST SCREENING: Primary | ICD-10-CM

## 2020-11-25 NOTE — PROGRESS NOTES
Health Maintenance Due   Topic Date Due    TETANUS VACCINE  10/14/1981    Shingles Vaccine (1 of 2) 10/14/2013    Influenza Vaccine (1) 08/01/2020    Colorectal Cancer Screening  10/12/2020     Updates were requested from care everywhere.  Chart was reviewed for overdue Proactive [facility] En[facility] ([facility]) topics (CRS, Breast Cancer Screening, Eye exam)  Health Maintenance has been updated.  LINKS immunization registry triggered.  LINKS not responding.

## 2020-11-30 ENCOUNTER — OFFICE VISIT (OUTPATIENT)
Dept: PAIN MEDICINE | Facility: CLINIC | Age: 55
End: 2020-11-30
Payer: COMMERCIAL

## 2020-11-30 DIAGNOSIS — G24.3 CERVICAL DYSTONIA: Primary | ICD-10-CM

## 2020-11-30 PROCEDURE — 99442 PR PHYSICIAN TELEPHONE EVALUATION 11-20 MIN: ICD-10-PCS | Mod: 95,,, | Performed by: NURSE PRACTITIONER

## 2020-11-30 PROCEDURE — 99442 PR PHYSICIAN TELEPHONE EVALUATION 11-20 MIN: CPT | Mod: 95,,, | Performed by: NURSE PRACTITIONER

## 2020-12-23 ENCOUNTER — PROCEDURE VISIT (OUTPATIENT)
Dept: PAIN MEDICINE | Facility: CLINIC | Age: 55
End: 2020-12-23
Payer: COMMERCIAL

## 2020-12-23 VITALS
BODY MASS INDEX: 19.71 KG/M2 | HEART RATE: 70 BPM | OXYGEN SATURATION: 99 % | WEIGHT: 104.38 LBS | HEIGHT: 61 IN | SYSTOLIC BLOOD PRESSURE: 164 MMHG | DIASTOLIC BLOOD PRESSURE: 104 MMHG | TEMPERATURE: 98 F

## 2020-12-23 DIAGNOSIS — G24.3 CERVICAL DYSTONIA: Primary | ICD-10-CM

## 2020-12-23 NOTE — PROCEDURES
Procedure: Botox injection to right trapezius muscle  Date 12/23/20  Pre op Diagnosis: cervical dystonia  Post op Diagnosis: Same  Lot # [id number]  Total units given: 20U  Total units wasted: 80U     Technique: Risks of the procedure were explained to the patient, informed consent was then signed.  Right trapezius region was prepped with alcohol.  Using sterile technique, after mixing a total of 100 units of onabutilin toxin A in 2 cc of normal saline for a concentration of 5 units per 0.1cc, a 30-gauge 1 inch needle was used for injecting the following muscles with corresponding units: right trapezius 4 locations, 5 units each total of 20 units. With each injection, aspiration was negative for blood or air.  Patient tolerated the procedure well. She was observed for 15 minutes before he left our office, given postoperative instructions to call for any side effects or complications.  She will follow up in the office in 6-8 weeks.

## 2021-01-04 ENCOUNTER — PATIENT MESSAGE (OUTPATIENT)
Dept: ADMINISTRATIVE | Facility: HOSPITAL | Age: 56
End: 2021-01-04

## 2021-01-06 DIAGNOSIS — Z12.31 OTHER SCREENING MAMMOGRAM: ICD-10-CM

## 2021-03-02 DIAGNOSIS — M47.812 SPONDYLOSIS OF CERVICAL REGION WITHOUT MYELOPATHY OR RADICULOPATHY: ICD-10-CM

## 2021-03-02 DIAGNOSIS — M47.812 CERVICAL SPONDYLOSIS: ICD-10-CM

## 2021-03-02 DIAGNOSIS — M54.16 LUMBAR RADICULOPATHY: ICD-10-CM

## 2021-03-02 DIAGNOSIS — M79.10 MYALGIA: ICD-10-CM

## 2021-03-02 RX ORDER — BACLOFEN 10 MG/1
10 TABLET ORAL 2 TIMES DAILY PRN
Qty: 60 TABLET | Refills: 2 | Status: SHIPPED | OUTPATIENT
Start: 2021-03-02 | End: 2021-05-25 | Stop reason: SDUPTHER

## 2021-04-06 ENCOUNTER — PATIENT MESSAGE (OUTPATIENT)
Dept: ADMINISTRATIVE | Facility: HOSPITAL | Age: 56
End: 2021-04-06

## 2021-05-25 DIAGNOSIS — M47.812 CERVICAL SPONDYLOSIS: ICD-10-CM

## 2021-05-25 DIAGNOSIS — M47.812 SPONDYLOSIS OF CERVICAL REGION WITHOUT MYELOPATHY OR RADICULOPATHY: ICD-10-CM

## 2021-05-25 DIAGNOSIS — M54.16 LUMBAR RADICULOPATHY: ICD-10-CM

## 2021-05-25 DIAGNOSIS — M79.10 MYALGIA: ICD-10-CM

## 2021-05-25 RX ORDER — BACLOFEN 10 MG/1
10 TABLET ORAL 2 TIMES DAILY PRN
Qty: 60 TABLET | Refills: 1 | Status: SHIPPED | OUTPATIENT
Start: 2021-05-25 | End: 2022-08-18

## 2021-07-07 ENCOUNTER — PATIENT MESSAGE (OUTPATIENT)
Dept: ADMINISTRATIVE | Facility: HOSPITAL | Age: 56
End: 2021-07-07

## 2022-05-31 ENCOUNTER — PATIENT MESSAGE (OUTPATIENT)
Dept: ADMINISTRATIVE | Facility: HOSPITAL | Age: 57
End: 2022-05-31
Payer: COMMERCIAL

## 2022-08-15 ENCOUNTER — TELEPHONE (OUTPATIENT)
Dept: FAMILY MEDICINE | Facility: CLINIC | Age: 57
End: 2022-08-15
Payer: COMMERCIAL

## 2022-08-15 NOTE — TELEPHONE ENCOUNTER
----- Message from Bony Slaughter sent at 8/15/2022  9:48 AM CDT -----  Contact: pt at [phone]  Type:  Sooner Appointment Request    Caller is requesting a sooner appointment.  Caller declined first available appointment listed below.  Caller will not accept being placed on the waitlist and is requesting a message be sent to doctor.    Name of Caller:  pt  When is the first available appointment?  9/16/22  Symptoms:  blood pressure issues  Best Call Back Number:  [phone]  Additional Information:  pt is calling the office to schedule an appt due to her having blood pressure issues and the date of 9/16/22 comes up she states she needs to be seen sooner than that date. Please call back and advise.

## 2022-08-17 ENCOUNTER — TELEPHONE (OUTPATIENT)
Dept: CARDIOLOGY | Facility: CLINIC | Age: 57
End: 2022-08-17
Payer: COMMERCIAL

## 2022-08-17 DIAGNOSIS — I10 ESSENTIAL HYPERTENSION: ICD-10-CM

## 2022-08-17 NOTE — TELEPHONE ENCOUNTER
----- Message from Deedee Keen, Patient Care Assistant sent at 8/17/2022  2:08 PM CDT -----  Regarding: refill  Contact: pt  Type:  RX Refill Request    Who Called:  pt   Refill or New Rx:  refill  RX Name and Strength: losartan (COZAAR) 50 MG tablet  How is the patient currently taking it?1Xday  Is this a 30 day or 90 day RX:  90  Preferred Pharmacy with phone number:   [id number] - [place] - [address]  [address]  [place] [address]  Phone: [phone] Fax: [phone]    Local or Mail Order:  local   Ordering Provider:  jagruti Dominique Call Back Number:  [phone] (home)     Additional Information:  please call pt to advise. Thanks!

## 2022-08-18 ENCOUNTER — OFFICE VISIT (OUTPATIENT)
Dept: FAMILY MEDICINE | Facility: CLINIC | Age: 57
End: 2022-08-18
Payer: COMMERCIAL

## 2022-08-18 ENCOUNTER — TELEPHONE (OUTPATIENT)
Dept: FAMILY MEDICINE | Facility: CLINIC | Age: 57
End: 2022-08-18

## 2022-08-18 VITALS
SYSTOLIC BLOOD PRESSURE: 132 MMHG | BODY MASS INDEX: 20.58 KG/M2 | OXYGEN SATURATION: 99 % | HEART RATE: 68 BPM | DIASTOLIC BLOOD PRESSURE: 84 MMHG | WEIGHT: 108.94 LBS | TEMPERATURE: 98 F

## 2022-08-18 DIAGNOSIS — I10 ESSENTIAL HYPERTENSION: ICD-10-CM

## 2022-08-18 DIAGNOSIS — Z00.00 PREVENTATIVE HEALTH CARE: Primary | ICD-10-CM

## 2022-08-18 PROCEDURE — 99396 PREV VISIT EST AGE 40-64: CPT | Mod: S$GLB,,, | Performed by: NURSE PRACTITIONER

## 2022-08-18 PROCEDURE — 99999 PR PBB SHADOW E&M-EST. PATIENT-LVL III: CPT | Mod: PBBFAC,,, | Performed by: NURSE PRACTITIONER

## 2022-08-18 PROCEDURE — 99396 PR PREVENTIVE VISIT,EST,40-64: ICD-10-PCS | Mod: S$GLB,,, | Performed by: NURSE PRACTITIONER

## 2022-08-18 PROCEDURE — 99999 PR PBB SHADOW E&M-EST. PATIENT-LVL III: ICD-10-PCS | Mod: PBBFAC,,, | Performed by: NURSE PRACTITIONER

## 2022-08-18 RX ORDER — LOSARTAN POTASSIUM 25 MG/1
25 TABLET ORAL DAILY
Qty: 90 TABLET | Refills: 1 | Status: SHIPPED | OUTPATIENT
Start: 2022-08-18

## 2022-08-18 RX ORDER — LOSARTAN POTASSIUM 50 MG/1
50 TABLET ORAL DAILY
Qty: 90 TABLET | Refills: 3 | Status: SHIPPED | OUTPATIENT
Start: 2022-08-18 | End: 2022-08-18

## 2022-08-18 RX ORDER — HYDROCHLOROTHIAZIDE 12.5 MG/1
12.5 TABLET ORAL DAILY
Qty: 90 TABLET | Refills: 3 | Status: SHIPPED | OUTPATIENT
Start: 2022-08-18 | End: 2022-08-18
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
## 2022-08-18 NOTE — TELEPHONE ENCOUNTER
----- Message from Hilario Bauer sent at 8/18/2022  9:53 AM CDT -----  Regarding: [facility] pharmacy called  Name of Who is Calling: SAM BARRETT [[id number]] Kimmy ([facility])      What is the request in detail: [facility] pharmacy calling to verify if the medication lorastan is a 25 mg or a 50 mg tablet.Please advise      Can the clinic reply by [facility]: No      [facility] Back [facility]: ([facility] )Kimmy [phone]

## 2022-08-19 ENCOUNTER — TELEPHONE (OUTPATIENT)
Dept: FAMILY MEDICINE | Facility: CLINIC | Age: 57
End: 2022-08-19
Payer: COMMERCIAL

## 2022-08-19 NOTE — TELEPHONE ENCOUNTER
----- Message from Quirino Almonte sent at 8/19/2022  9:14 AM CDT -----  Type:  Patient Returning Call    Who Called:  Celi rao/ Bhavin  Who Left Message for Patient:  Faith    Does the patient know what this is regarding?:  Fax to [facility] in regards to RX    Best Call Back Number:  [phone]    Additional Information: Sts they did not receive the fax that was sent with the proper dosage.   Please advise -- Thank you

## 2022-08-24 NOTE — PROGRESS NOTES
LINKS immunization registry, Care Everywhere and Health Maintenance updated.  Chart reviewed for overdue Proactive [facility] health maintenance testing.  
PROVIDER:[TOKEN:[[id number]],FOLLOWUP:[1 week]]

## 2022-09-14 DIAGNOSIS — Z12.31 OTHER SCREENING MAMMOGRAM: ICD-10-CM

## 2022-09-19 ENCOUNTER — PATIENT MESSAGE (OUTPATIENT)
Dept: ADMINISTRATIVE | Facility: HOSPITAL | Age: 57
End: 2022-09-19
Payer: COMMERCIAL

## 2023-02-20 NOTE — PT/OT/SLP EVAL
Occupational Therapy   Evaluation    Name: Rabia Winn  MRN: [id number]  Admitting Diagnosis:  Spondylolisthesis, cervical region 1 Day Post-Op  DISCECTOMY, SPINE, CERVICAL, ANTERIOR APPROACH, WITH FUSION C3-4, 4-5, 5-6, 6-7    Recommendations:     Discharge Recommendations: HH OT/PT  Discharge Equipment Recommendations:  walker, rolling (pending progress)  Barriers to discharge:  None    Assessment:     Rabia Winn is a 53 y.o. female with a medical diagnosis of Spondylolisthesis, cervical region.  She presents with performance deficits including weakness, impaired endurance, impaired self care skills, impaired functional mobilty, gait instability, pain. Pt would continue to benefit from OT to maximize functional independence and safety before returning home.     Rehab Prognosis: Good; patient would benefit from acute skilled OT services to address these deficits and reach maximum level of function.       Plan:     Patient to be seen 3 x/week to address the above listed problems via self-care/home management, therapeutic activities, therapeutic exercises  · Plan of Care Expires: 03/06/19  · Plan of Care Reviewed with: patient, spouse    Subjective     Chief Complaint: Pain  Patient/Family Comments/goals: Return to OF    Occupational Profile:  Pt lives with her  in a 1SH with TH to enter and walk-in shower with built-in seat.   Prior to admission, patients level of function was independent; driving and working.    Equipment used at home: (built-in shower chair).    DME owned (not currently used): none.    Upon discharge, patient will have assistance from .     Pain/Comfort:  · Pain Rating 1: 4/10  · Location 1: back(and neck)  · Pain Addressed 1: Pre-medicate for activity, Reposition, Cessation of Activity  · Pain Rating Post-Intervention 1: 4/10    Patients cultural, spiritual, Rastafarian conflicts given the current situation: no    Objective:     Communicated with: RN prior to  session.  Patient found supine with cervical collar, PureWick, oxygen(drain) upon OT entry to room,  present.    General Precautions: Standard, fall   Orthopedic Precautions:spinal precautions   Braces: Cervical collar     Occupational Performance:    Bed Mobility:    · Patient completed Rolling/Turning to Left with minimum assistance and with side rail  · Patient completed Supine to Sit with minimum assistance    Functional Mobility/Transfers:  · Patient completed Sit <> Stand Transfer with contact guard assistance with hand-held assist from EOB  · Functional Mobility: Within room household distance with Min A hand-held assist; standing rest breaks and limited distance due to dizziness and c/o weakness    Activities of Daily Living:  · Upper Body Dressing: moderate assistance to don gown around back and adjust c-collar  · Lower Body Dressing: moderate assistance to don socks    Cognitive/Visual Perceptual:  Cognitive/Psychosocial Skills:     -       Oriented to: Person, Place, Time and Situation   -       Follows Commands/attention:Follows multistep commands  -       Communication: clear/fluent  -       Safety awareness/insight to disability: intact   Visual/Perceptual:      -Intact      Physical Exam:  Balance:    -       good sitting, fair standing balance  Postural examination/scapula alignment:    -       No postural abnormalities identified  Sensation:    -       Intact  Dominant hand:    -       Right  Upper Extremity Range of Motion:     -       Right Upper Extremity: WFL  -       Left Upper Extremity: WFL  Upper Extremity Strength:    -       Right Upper Extremity: 4/5 throughout  -       Left Upper Extremity: 4/5 throughout   Strength:    -       Right Upper Extremity: WFL  -       Left Upper Extremity: WFL  Fine Motor Coordination:    -       Intact    AMPAC 6 Click ADL:  AMPAC Total Score: 19    Treatment & Education:  OT/PT eval; educated on OT role and POC and to call for assistance before  getting up  Education:    Patient left up in chair with all lines intact, call button in reach, RN notified and spouse present    GOALS:   Multidisciplinary Problems     Occupational Therapy Goals        Problem: Occupational Therapy Goal    Goal Priority Disciplines Outcome Interventions   Occupational Therapy Goal     OT, PT/OT Ongoing (interventions implemented as appropriate)    Description:  Goals to be met by: 7 days (2/13/19)     Patient will increase functional independence with ADLs by performing:    UE Dressing with Supervision.  LE Dressing with Supervision.  Grooming while standing at sink with Supervision.  Toileting from toilet with Supervision for hygiene and clothing management.   Supine to sit with Supervision.  Toilet transfer to toilet with Supervision.  Pt will complete functional mobility household distance with Supervision using AD as needed.                      History:     Past Medical History:   Diagnosis Date    Arthritis     Asthma     DDD (degenerative disc disease), cervical     DDD (degenerative disc disease), lumbar     Epilepsy     HTN (hypertension)        Past Surgical History:   Procedure Laterality Date    Block-nerve-medial branch-cervical C3-6 Bilateral 12/19/2018    Per[facility] at [facility]    DISCECTOMY, SPINE, CERVICAL, ANTERIOR APPROACH, WITH FUSION C3-4, 4-5, 5-6, 6-7 N/A 2/5/2019    Performed by [facility] Kerr MD at [facility] OR 2ND FLR    GANGLION CYST EXCISION Right     right breast    HYSTERECTOMY         Time Tracking:     OT Date of Treatment: 02/06/19  OT Start Time: 0901  OT Stop Time: 0922  OT Total Time (min): 21 min    Billable Minutes:Evaluation 21 minutes    CHIRAG Bonilla  2/6/2019     Consent (Nose)/Introductory Paragraph: The rationale for Mohs was explained to the patient and consent was obtained. The risks, benefits and alternatives to therapy were discussed in detail. Specifically, the risks of nasal deformity, changes in the flow of air through the nose, infection, scarring, bleeding, prolonged wound healing, incomplete removal, allergy to anesthesia, nerve injury and recurrence were addressed. Prior to the procedure, the treatment site was clearly identified and confirmed by the patient. All components of Universal Protocol/PAUSE Rule completed.

## (undated) DEVICE — APPLICATOR CHLORAPREP CLR 10.5

## (undated) DEVICE — DRESSING SURGICAL 1/2X1/2

## (undated) DEVICE — NDL SPINAL 18GX3.5 SPINOCAN

## (undated) DEVICE — SUT CTD VICRYL 2-0 UND BR

## (undated) DEVICE — DRAPE OPMI STERILE

## (undated) DEVICE — DRAIN TLS 7MM POREX

## (undated) DEVICE — PIN DISTRACTOR 14MM
Type: IMPLANTABLE DEVICE | Site: NECK | Status: NON-FUNCTIONAL
Removed: 2019-02-05

## (undated) DEVICE — NDL SPINAL 25GX3.5 SPINOCAN

## (undated) DEVICE — SUT CTD VICRYL 3-0 CR/SH

## (undated) DEVICE — Device

## (undated) DEVICE — SEE MEDLINE ITEM 157131

## (undated) DEVICE — KIT POWDER ABSORBABLE GELATIN

## (undated) DEVICE — KIT SURGIFLO HEMOSTATIC MATRIX

## (undated) DEVICE — SPONGE PATTY SURGICAL .5X3IN

## (undated) DEVICE — HEMOSTAT SURGICEL 4X8IN

## (undated) DEVICE — ELECTRODE REM PLYHSV RETURN 9

## (undated) DEVICE — SEE MEDLINE ITEM 152622

## (undated) DEVICE — SUT VICRYL PLUS 3-0 SH 18IN

## (undated) DEVICE — BURR MIS CURVED 3.0MM

## (undated) DEVICE — SYMMETRY SHARP KERRISON TIP 2MM THIN FOOTPLATE

## (undated) DEVICE — BUR BONE CUT MICRO TPS 3X3.8MM

## (undated) DEVICE — TRAY NERVE BLOCK

## (undated) DEVICE — CORD BIPOLAR 12 FOOT

## (undated) DEVICE — SUT CTD VICRYL 3-0 UND BR

## (undated) DEVICE — SUT VICRYL+ 2-0 SH 18IN

## (undated) DEVICE — SEE MEDLINE ITEM 146292

## (undated) DEVICE — TUBE FRAZIER 5MM 2FT SOFT TIP

## (undated) DEVICE — PACK SET UP CONVERTORS

## (undated) DEVICE — GLOVE 7.5 PROTEXIS PI MICRO

## (undated) DEVICE — DRAPE INCISE IOBAN 2 23X17IN

## (undated) DEVICE — DRAPE THYROID WITH ARMBOARD

## (undated) DEVICE — MARKER SKIN STND TIP BLUE BARR

## (undated) DEVICE — ADHESIVE MASTISOL VIAL 48/BX

## (undated) DEVICE — SUT MONOCRYL 4-0 PS-1 UND

## (undated) DEVICE — SEE MEDLINE ITEM 157194

## (undated) DEVICE — RUBBERBAND STERILE 3X1/8IN

## (undated) DEVICE — BLADE 4IN EDGE INSULATED

## (undated) DEVICE — GAUZE SPONGE PEANUT STRL

## (undated) DEVICE — DRAPE STERI INSTRUMENT 1018

## (undated) DEVICE — SEE MEDLINE ITEM 156905

## (undated) DEVICE — STRIP STERI REIN CLSR 1/2X2IN

## (undated) DEVICE — SPONGE GAUZE 16PLY 4X4